# Patient Record
Sex: MALE | Race: WHITE | NOT HISPANIC OR LATINO | Employment: OTHER | URBAN - METROPOLITAN AREA
[De-identification: names, ages, dates, MRNs, and addresses within clinical notes are randomized per-mention and may not be internally consistent; named-entity substitution may affect disease eponyms.]

---

## 2023-03-23 LAB
6-ACETYLMORPHINE: <25 NG/ML
7-AMINOCLONAZEPAM: <25 NG/ML
ALPHA-HYDROXYALPRAZOLAM: <25 NG/ML
ALPHA-HYDROXYMIDAZOLAM: <25 NG/ML
ALPRAZOLAM: <25 NG/ML
AMPHETAMINE (PRESENCE) IN URINE BY SCREEN METHOD: NORMAL
BARBITURATES PRESENCE IN URINE BY SCREEN METHOD: NORMAL
CANNABINOIDS IN URINE BY SCREEN METHOD: NORMAL
CHLORDIAZEPOXIDE: <25 NG/ML
CLONAZEPAM: <25 NG/ML
COCAINE (PRESENCE) IN URINE BY SCREEN METHOD: NORMAL
CODEINE: <50 NG/ML
CREATINE, URINE FOR DRUG: 342.3 MG/DL
DIAZEPAM: <25 NG/ML
DRUG SCREEN COMMENT URINE: NORMAL
EDDP: <25 NG/ML
FENTANYL CONFIRMATION, URINE: <2.5 NG/ML
HYDROCODONE: <25 NG/ML
HYDROMORPHONE: <25 NG/ML
LORAZEPAM: <25 NG/ML
METHADONE CONFIRMATION,URINE: <25 NG/ML
MIDAZOLAM: <25 NG/ML
MORPHINE URINE: <50 NG/ML
NORDIAZEPAM: <25 NG/ML
NORFENTANYL: <2.5 NG/ML
NORHYDROCODONE: <25 NG/ML
NOROXYCODONE: <25 NG/ML
O-DESMETHYLTRAMADOL: <50 NG/ML
OXAZEPAM: <25 NG/ML
OXYCODONE: <25 NG/ML
OXYMORPHONE: <25 NG/ML
PHENCYCLIDINE (PRESENCE) IN URINE BY SCREEN METHOD: NORMAL
TEMAZEPAM: <25 NG/ML
TRAMADOL: <50 NG/ML
ZOLPIDEM METABOLITE (ZCA): <25 NG/ML
ZOLPIDEM: <25 NG/ML

## 2023-04-18 LAB
ALANINE AMINOTRANSFERASE (SGPT) (U/L) IN SER/PLAS: 16 U/L (ref 10–52)
ALBUMIN (G/DL) IN SER/PLAS: 4.4 G/DL (ref 3.4–5)
ALKALINE PHOSPHATASE (U/L) IN SER/PLAS: 121 U/L (ref 33–120)
ANION GAP IN SER/PLAS: 14 MMOL/L (ref 10–20)
ASPARTATE AMINOTRANSFERASE (SGOT) (U/L) IN SER/PLAS: 14 U/L (ref 9–39)
BILIRUBIN TOTAL (MG/DL) IN SER/PLAS: 0.4 MG/DL (ref 0–1.2)
CALCIUM (MG/DL) IN SER/PLAS: 9.7 MG/DL (ref 8.6–10.6)
CARBON DIOXIDE, TOTAL (MMOL/L) IN SER/PLAS: 24 MMOL/L (ref 21–32)
CHLORIDE (MMOL/L) IN SER/PLAS: 107 MMOL/L (ref 98–107)
CHOLESTEROL (MG/DL) IN SER/PLAS: 174 MG/DL (ref 0–199)
CHOLESTEROL IN HDL (MG/DL) IN SER/PLAS: 41.3 MG/DL
CHOLESTEROL/HDL RATIO: 4.2
COBALAMIN (VITAMIN B12) (PG/ML) IN SER/PLAS: 322 PG/ML (ref 211–911)
CREATININE (MG/DL) IN SER/PLAS: 1.1 MG/DL (ref 0.5–1.3)
ERYTHROCYTE DISTRIBUTION WIDTH (RATIO) BY AUTOMATED COUNT: 14.1 % (ref 11.5–14.5)
ERYTHROCYTE MEAN CORPUSCULAR HEMOGLOBIN CONCENTRATION (G/DL) BY AUTOMATED: 31.4 G/DL (ref 32–36)
ERYTHROCYTE MEAN CORPUSCULAR VOLUME (FL) BY AUTOMATED COUNT: 87 FL (ref 80–100)
ERYTHROCYTES (10*6/UL) IN BLOOD BY AUTOMATED COUNT: 4.72 X10E12/L (ref 4.5–5.9)
ESTIMATED AVERAGE GLUCOSE FOR HBA1C: 131 MG/DL
GFR MALE: 79 ML/MIN/1.73M2
GLUCOSE (MG/DL) IN SER/PLAS: 93 MG/DL (ref 74–99)
HEMATOCRIT (%) IN BLOOD BY AUTOMATED COUNT: 41.1 % (ref 41–52)
HEMOGLOBIN (G/DL) IN BLOOD: 12.9 G/DL (ref 13.5–17.5)
HEMOGLOBIN A1C/HEMOGLOBIN TOTAL IN BLOOD: 6.2 %
LDL: 99 MG/DL (ref 0–99)
LEUKOCYTES (10*3/UL) IN BLOOD BY AUTOMATED COUNT: 6.4 X10E9/L (ref 4.4–11.3)
NRBC (PER 100 WBCS) BY AUTOMATED COUNT: 0 /100 WBC (ref 0–0)
PLATELETS (10*3/UL) IN BLOOD AUTOMATED COUNT: 270 X10E9/L (ref 150–450)
POTASSIUM (MMOL/L) IN SER/PLAS: 4.1 MMOL/L (ref 3.5–5.3)
PROSTATE SPECIFIC ANTIGEN,SCREEN: 1.3 NG/ML (ref 0–4)
PROTEIN TOTAL: 7 G/DL (ref 6.4–8.2)
SODIUM (MMOL/L) IN SER/PLAS: 141 MMOL/L (ref 136–145)
TRIGLYCERIDE (MG/DL) IN SER/PLAS: 169 MG/DL (ref 0–149)
UREA NITROGEN (MG/DL) IN SER/PLAS: 13 MG/DL (ref 6–23)
VLDL: 34 MG/DL (ref 0–40)

## 2023-04-25 DIAGNOSIS — I10 ESSENTIAL (PRIMARY) HYPERTENSION: ICD-10-CM

## 2023-04-27 ENCOUNTER — OFFICE VISIT (OUTPATIENT)
Dept: PRIMARY CARE | Facility: CLINIC | Age: 53
End: 2023-04-27
Payer: MEDICARE

## 2023-04-27 VITALS
WEIGHT: 194 LBS | SYSTOLIC BLOOD PRESSURE: 122 MMHG | DIASTOLIC BLOOD PRESSURE: 76 MMHG | HEIGHT: 72 IN | BODY MASS INDEX: 26.28 KG/M2

## 2023-04-27 DIAGNOSIS — I10 PRIMARY HYPERTENSION: ICD-10-CM

## 2023-04-27 DIAGNOSIS — I73.1 THROMBOANGIITIS OBLITERANS (BUERGER'S DISEASE) (CMS-HCC): ICD-10-CM

## 2023-04-27 DIAGNOSIS — G95.9 CERVICAL MYELOPATHY (MULTI): Primary | ICD-10-CM

## 2023-04-27 DIAGNOSIS — E11.9 TYPE 2 DIABETES MELLITUS WITHOUT COMPLICATION, WITHOUT LONG-TERM CURRENT USE OF INSULIN (MULTI): ICD-10-CM

## 2023-04-27 DIAGNOSIS — N18.30 STAGE 3 CHRONIC KIDNEY DISEASE, UNSPECIFIED WHETHER STAGE 3A OR 3B CKD (MULTI): ICD-10-CM

## 2023-04-27 DIAGNOSIS — R31.29 MICROSCOPIC HEMATURIA: ICD-10-CM

## 2023-04-27 PROCEDURE — 1036F TOBACCO NON-USER: CPT | Performed by: STUDENT IN AN ORGANIZED HEALTH CARE EDUCATION/TRAINING PROGRAM

## 2023-04-27 PROCEDURE — 4010F ACE/ARB THERAPY RXD/TAKEN: CPT | Performed by: STUDENT IN AN ORGANIZED HEALTH CARE EDUCATION/TRAINING PROGRAM

## 2023-04-27 PROCEDURE — 3044F HG A1C LEVEL LT 7.0%: CPT | Performed by: STUDENT IN AN ORGANIZED HEALTH CARE EDUCATION/TRAINING PROGRAM

## 2023-04-27 PROCEDURE — 3074F SYST BP LT 130 MM HG: CPT | Performed by: STUDENT IN AN ORGANIZED HEALTH CARE EDUCATION/TRAINING PROGRAM

## 2023-04-27 PROCEDURE — 3078F DIAST BP <80 MM HG: CPT | Performed by: STUDENT IN AN ORGANIZED HEALTH CARE EDUCATION/TRAINING PROGRAM

## 2023-04-27 PROCEDURE — 99214 OFFICE O/P EST MOD 30 MIN: CPT | Performed by: STUDENT IN AN ORGANIZED HEALTH CARE EDUCATION/TRAINING PROGRAM

## 2023-04-27 RX ORDER — WARFARIN 7.5 MG/1
7.5 TABLET ORAL NIGHTLY
COMMUNITY
End: 2023-04-27 | Stop reason: SDUPTHER

## 2023-04-27 RX ORDER — TOPIRAMATE 25 MG/1
25 TABLET ORAL DAILY
COMMUNITY
Start: 2023-04-03 | End: 2023-04-27 | Stop reason: SINTOL

## 2023-04-27 RX ORDER — OMEPRAZOLE 40 MG/1
40 CAPSULE, DELAYED RELEASE ORAL 2 TIMES DAILY
COMMUNITY

## 2023-04-27 RX ORDER — DOCUSATE SODIUM 100 MG/1
100 CAPSULE, LIQUID FILLED ORAL 2 TIMES DAILY
COMMUNITY

## 2023-04-27 RX ORDER — AMLODIPINE BESYLATE 10 MG/1
10 TABLET ORAL DAILY
Qty: 90 TABLET | Refills: 1 | Status: SHIPPED | OUTPATIENT
Start: 2023-04-27 | End: 2023-11-13 | Stop reason: WASHOUT

## 2023-04-27 RX ORDER — LOSARTAN POTASSIUM 100 MG/1
TABLET ORAL
Qty: 90 TABLET | Refills: 1 | Status: SHIPPED | OUTPATIENT
Start: 2023-04-27 | End: 2023-10-24

## 2023-04-27 RX ORDER — GABAPENTIN 300 MG/1
600 CAPSULE ORAL EVERY 8 HOURS
COMMUNITY

## 2023-04-27 RX ORDER — ACETAMINOPHEN 500 MG
500 TABLET ORAL EVERY 6 HOURS PRN
COMMUNITY

## 2023-04-27 RX ORDER — FERROUS SULFATE 325(65) MG
65 TABLET, DELAYED RELEASE (ENTERIC COATED) ORAL
COMMUNITY
Start: 2020-07-24

## 2023-04-27 RX ORDER — AMLODIPINE BESYLATE 10 MG/1
TABLET ORAL
Qty: 90 TABLET | Refills: 1 | Status: SHIPPED | OUTPATIENT
Start: 2023-04-27 | End: 2023-12-18 | Stop reason: SDUPTHER

## 2023-04-27 RX ORDER — AMLODIPINE BESYLATE 10 MG/1
10 TABLET ORAL DAILY
COMMUNITY
End: 2023-04-27 | Stop reason: SDUPTHER

## 2023-04-27 RX ORDER — ASPIRIN 81 MG/1
81 TABLET ORAL DAILY
COMMUNITY
Start: 2017-01-19

## 2023-04-27 RX ORDER — BACLOFEN 10 MG/1
10 TABLET ORAL 3 TIMES DAILY
COMMUNITY
End: 2024-01-26 | Stop reason: SDUPTHER

## 2023-04-27 RX ORDER — WARFARIN 7.5 MG/1
7.5 TABLET ORAL NIGHTLY
Qty: 90 TABLET | Refills: 1 | Status: SHIPPED | OUTPATIENT
Start: 2023-04-27 | End: 2023-12-18 | Stop reason: SDUPTHER

## 2023-04-27 RX ORDER — METFORMIN HYDROCHLORIDE 500 MG/1
500 TABLET, EXTENDED RELEASE ORAL DAILY
COMMUNITY
Start: 2023-04-17 | End: 2023-10-03

## 2023-04-27 RX ORDER — TIZANIDINE 2 MG/1
2 TABLET ORAL EVERY 6 HOURS PRN
COMMUNITY
Start: 2023-01-17 | End: 2023-04-27

## 2023-04-27 RX ORDER — VIT C/E/ZN/COPPR/LUTEIN/ZEAXAN 250MG-90MG
25 CAPSULE ORAL DAILY
COMMUNITY
End: 2024-01-26 | Stop reason: SDUPTHER

## 2023-04-27 RX ORDER — LOSARTAN POTASSIUM 100 MG/1
100 TABLET ORAL DAILY
COMMUNITY
End: 2024-03-04 | Stop reason: SDUPTHER

## 2023-04-27 RX ORDER — DULOXETIN HYDROCHLORIDE 60 MG/1
60 CAPSULE, DELAYED RELEASE ORAL 2 TIMES DAILY
COMMUNITY

## 2023-04-27 RX ORDER — ATORVASTATIN CALCIUM 40 MG/1
40 TABLET, FILM COATED ORAL DAILY
COMMUNITY
End: 2023-07-14

## 2023-04-27 NOTE — PROGRESS NOTES
Subjective   Patient ID: Noah Allen is a 53 y.o. male who presents for Follow-up (Med refill).    HPI   Reports doing alright. Continues to attend weekly AA sessions. Last drink was 5-6 months ago. Notes no particular improvement or worsening of mood. Denies current or any recent SI/HI. He is tolerating the increased metformin dose, was happy to hear his A1c went down. Pt was glads his carotid US was unremarkable and knows he needs a repeat egd- said he prefers to get it ordered through his GI provider and will call Dr. Shaffer (who is aware of this, per note review). Pt was also advised by his nephrologist to follow up on hematuria noted on his recent UA (Large, RBC >150). For the past 2years pt has had to wake up at least 3 times to use the restroom. Denies any hesitation, incomplete emptying, dysuria or hematuria. No personal or family hx of bladder or prostate Ca. Quit smoking 5yrs ago was smoking 1pp/day for at least 5 years.    Review of Systems  ROS negative for fever, chills, chest pain, shortness of breath, cough, abdominal pain, nausea, vomiting, dysuria.     Objective   /76   Ht 1.829 m (6')   Wt 88 kg (194 lb)   BMI 26.31 kg/m²     Physical Exam  Constitutional:       General: He is not in acute distress.  HENT:      Mouth/Throat:      Mouth: Mucous membranes are moist.   Eyes:      Extraocular Movements: Extraocular movements intact.      Pupils: Pupils are equal, round, and reactive to light.   Cardiovascular:      Rate and Rhythm: Normal rate and regular rhythm.   Pulmonary:      Breath sounds: Normal breath sounds. No wheezing or rhonchi.   Abdominal:      General: Bowel sounds are normal. There is no distension.      Palpations: Abdomen is soft.      Tenderness: There is no abdominal tenderness.   Musculoskeletal:      Right lower leg: No edema.      Left lower leg: No edema.   Skin:     General: Skin is warm and dry.   Neurological:      General: No focal deficit present.       Mental Status: He is alert and oriented to person, place, and time.   Psychiatric:         Mood and Affect: Mood normal.           Assessment/Plan   #microscopic hematuria  -Follows a Nephrologist at [facility]. UA done at [facility] for microscopic hematuria (Large, RBC >150)  -PSA 1.33  -refer to urology    #Lightheadedness  #C/f Carotid Obstruction  - Carotid Ultrasound Feb 2023 negative for stenosis greater than 50%. Pt no longer bothered by LH    #T2DM  :: 4/2023 A1c 6.2%.   - c/w Metformin to 500mg BID     #C/f EtOH Use DIsorder  :: Per daughter Tori at prior appointment, this has been a more chronic, long-standing problem than the patient has self-reported. Previously was provided referral to substance use disorder counselor and access clinic for Psychiatry. Discussed possible outpatient rehab admission, resources, support groups available.  - Following w/ AA, Neymar quach, completed IOP  - Has abstained from EtOH since NOv. 2022    #Depression- stable on Duloxetine, follows with psychiatry. He denies active or passive suicidal ideation, but advised to seek immediate medical attention if her were to have suicidal thoughts.    #C/f SI  :: No active SI at this time  :: Son removed guns from home  :: Was recommended to go to ED if there is any concern for active SI.    #HTN- Maintained on amlodipine and losartan. Still elevated, Stage II, patient reports stressors  #Neck Surgeries, Neuropathy- Follows at [facility], now awaiting new provider given closure of SV, maintained on baclofen and gabapentin.   #Buerger's Disease- Maintained on coumadin, quit smoking 4 years ago, follows with INR clinic; monitor closely on Metformin  #GERD, Salinas's esophagus- On PPI, due for repeat EGD in summer  #HLD- On atorvastatin 40mg, recent lipids reviewed.  #CKD- Follows with nephrology at [facility]   #SAAD: seeing sleep medicine, CPAP has been advised    #Health Maintenance  Has had colonoscopy 2020 (repeat due in  5 years), repeat EGD for Salinas's due July 2023 (pt prefers to call GI provider to schedule this), has COVID vaccine x2, recommended additional dose.   LDCT screening for lung cancer advised, would like to discuss this during next visit    RTC- 3 month     Trainee role: Resident    I saw and evaluated the patient. I personally obtained the key and critical portions of the history and physical exam or was physically present for key and critical portions performed by the trainee. I reviewed the trainee's documentation and discussed the patient with the trainee. I agree with the trainee's medical decision making as documented on the trainee's notes.    Braulio Jain M.D.

## 2023-06-26 LAB
CREATININE (MG/DL) IN SER/PLAS: 1.13 MG/DL (ref 0.5–1.3)
GFR MALE: 77 ML/MIN/1.73M2

## 2023-07-14 DIAGNOSIS — E78.5 HYPERLIPIDEMIA, UNSPECIFIED: ICD-10-CM

## 2023-07-14 RX ORDER — ATORVASTATIN CALCIUM 40 MG/1
TABLET, FILM COATED ORAL
Qty: 90 TABLET | Refills: 0 | Status: SHIPPED | OUTPATIENT
Start: 2023-07-14 | End: 2023-10-17

## 2023-07-27 ENCOUNTER — OFFICE VISIT (OUTPATIENT)
Dept: PRIMARY CARE | Facility: CLINIC | Age: 53
End: 2023-07-27
Payer: MEDICARE

## 2023-07-27 VITALS
BODY MASS INDEX: 26.01 KG/M2 | SYSTOLIC BLOOD PRESSURE: 122 MMHG | HEIGHT: 72 IN | WEIGHT: 192 LBS | DIASTOLIC BLOOD PRESSURE: 74 MMHG

## 2023-07-27 DIAGNOSIS — Z00.00 HEALTH CARE MAINTENANCE: ICD-10-CM

## 2023-07-27 DIAGNOSIS — I10 PRIMARY HYPERTENSION: ICD-10-CM

## 2023-07-27 DIAGNOSIS — M25.541 ARTHRALGIA OF RIGHT HAND: ICD-10-CM

## 2023-07-27 DIAGNOSIS — E11.9 TYPE 2 DIABETES MELLITUS WITHOUT COMPLICATION, WITHOUT LONG-TERM CURRENT USE OF INSULIN (MULTI): Primary | ICD-10-CM

## 2023-07-27 DIAGNOSIS — N18.30 STAGE 3 CHRONIC KIDNEY DISEASE, UNSPECIFIED WHETHER STAGE 3A OR 3B CKD (MULTI): ICD-10-CM

## 2023-07-27 PROCEDURE — 4010F ACE/ARB THERAPY RXD/TAKEN: CPT | Performed by: STUDENT IN AN ORGANIZED HEALTH CARE EDUCATION/TRAINING PROGRAM

## 2023-07-27 PROCEDURE — 3044F HG A1C LEVEL LT 7.0%: CPT | Performed by: STUDENT IN AN ORGANIZED HEALTH CARE EDUCATION/TRAINING PROGRAM

## 2023-07-27 PROCEDURE — 1036F TOBACCO NON-USER: CPT | Performed by: STUDENT IN AN ORGANIZED HEALTH CARE EDUCATION/TRAINING PROGRAM

## 2023-07-27 PROCEDURE — 99214 OFFICE O/P EST MOD 30 MIN: CPT | Performed by: STUDENT IN AN ORGANIZED HEALTH CARE EDUCATION/TRAINING PROGRAM

## 2023-07-27 PROCEDURE — 3074F SYST BP LT 130 MM HG: CPT | Performed by: STUDENT IN AN ORGANIZED HEALTH CARE EDUCATION/TRAINING PROGRAM

## 2023-07-27 PROCEDURE — 3078F DIAST BP <80 MM HG: CPT | Performed by: STUDENT IN AN ORGANIZED HEALTH CARE EDUCATION/TRAINING PROGRAM

## 2023-07-27 NOTE — PROGRESS NOTES
Subjective   Patient ID: Noah Allen is a 53 y.o. male who presents for Follow-up (Discuss med/Right hand discomfort).    HPI   Routine fu.  He feels generally well.  He remains abstinent from alcohol for almost 8 months, attending AA.    He has intentionally lost about 50 lbs from his heaviest.  Review of Systems  12-point ROS reviewed and was negative unless otherwise noted in HPI.    Objective   /74   Ht 1.829 m (6')   Wt 87.1 kg (192 lb)   BMI 26.04 kg/m²     Physical Exam  GEN: conversant, NAD  HEENT: PERRL, EOMI, MMM  NECK: supple  CV: S1, S2, RRR  PULM: CTAB  ABD: soft, NT, ND  NEURO: no new gross focal deficits  EXT: no sig LE edema  PSYCH: appropriate affect    Assessment/Plan     #microscopic hematuria  -Follows a Nephrology and urology     #T2DM  :: 4/2023 A1c 6.2%.   - he wants to stop metformin, repeat A1c prior to next visit     #previous EtOH Use DIsorder  - Following w/ [facility], Neymar quach, completed IOP  - Has abstained from EtOH since Nov. 2022     #Depression- stable on Duloxetine, follows with psychiatry.      #HTN- stable, maintained on amlodipine and losartan.    #Neck Surgeries, Neuropathy- Follows at [facility], now awaiting new provider given closure of SV, maintained on baclofen and gabapentin.   #Buerger's Disease- Maintained on coumadin, quit smoking 5 years ago, follows with INR clinic  #GERD, Salinas's esophagus- On PPI, due for repeat EGD in summer  #HLD- On atorvastatin 40mg, recent lipids reviewed.  #CKD- Follows with nephrology at [facility]   #SAAD: seeing sleep medicine, CPAP has been advised     #Health Maintenance  Has had colonoscopy 2020 (repeat due in 5 years), repeat EGD for Salinas's due July 2023 (pt prefers to call GI provider to schedule this), has COVID vaccines  LDCT screening for lung cancer advised 11/2023, patient will consider     RTC- 3 month

## 2023-09-28 LAB
ALANINE AMINOTRANSFERASE (SGPT) (U/L) IN SER/PLAS: 21 U/L (ref 10–52)
ALBUMIN (G/DL) IN SER/PLAS: 4.3 G/DL (ref 3.4–5)
ALKALINE PHOSPHATASE (U/L) IN SER/PLAS: 113 U/L (ref 33–120)
ANION GAP IN SER/PLAS: 13 MMOL/L (ref 10–20)
ASPARTATE AMINOTRANSFERASE (SGOT) (U/L) IN SER/PLAS: 22 U/L (ref 9–39)
BASOPHILS (10*3/UL) IN BLOOD BY AUTOMATED COUNT: 0.04 X10E9/L (ref 0–0.1)
BASOPHILS/100 LEUKOCYTES IN BLOOD BY AUTOMATED COUNT: 0.7 % (ref 0–2)
BILIRUBIN TOTAL (MG/DL) IN SER/PLAS: 0.4 MG/DL (ref 0–1.2)
CALCIUM (MG/DL) IN SER/PLAS: 9.3 MG/DL (ref 8.6–10.3)
CARBON DIOXIDE, TOTAL (MMOL/L) IN SER/PLAS: 26 MMOL/L (ref 21–32)
CHLORIDE (MMOL/L) IN SER/PLAS: 101 MMOL/L (ref 98–107)
CREATININE (MG/DL) IN SER/PLAS: 0.92 MG/DL (ref 0.5–1.3)
EOSINOPHILS (10*3/UL) IN BLOOD BY AUTOMATED COUNT: 0.14 X10E9/L (ref 0–0.7)
EOSINOPHILS/100 LEUKOCYTES IN BLOOD BY AUTOMATED COUNT: 2.5 % (ref 0–6)
ERYTHROCYTE DISTRIBUTION WIDTH (RATIO) BY AUTOMATED COUNT: 13.5 % (ref 11.5–14.5)
ERYTHROCYTE MEAN CORPUSCULAR HEMOGLOBIN CONCENTRATION (G/DL) BY AUTOMATED: 32 G/DL (ref 32–36)
ERYTHROCYTE MEAN CORPUSCULAR VOLUME (FL) BY AUTOMATED COUNT: 86 FL (ref 80–100)
ERYTHROCYTES (10*6/UL) IN BLOOD BY AUTOMATED COUNT: 5.08 X10E12/L (ref 4.5–5.9)
GFR MALE: >90 ML/MIN/1.73M2
GLUCOSE (MG/DL) IN SER/PLAS: 71 MG/DL (ref 74–99)
HEMATOCRIT (%) IN BLOOD BY AUTOMATED COUNT: 43.8 % (ref 41–52)
HEMOGLOBIN (G/DL) IN BLOOD: 14 G/DL (ref 13.5–17.5)
IMMATURE GRANULOCYTES/100 LEUKOCYTES IN BLOOD BY AUTOMATED COUNT: 0.2 % (ref 0–0.9)
LEUKOCYTES (10*3/UL) IN BLOOD BY AUTOMATED COUNT: 5.7 X10E9/L (ref 4.4–11.3)
LYMPHOCYTES (10*3/UL) IN BLOOD BY AUTOMATED COUNT: 2.11 X10E9/L (ref 1.2–4.8)
LYMPHOCYTES/100 LEUKOCYTES IN BLOOD BY AUTOMATED COUNT: 37.1 % (ref 13–44)
MONOCYTES (10*3/UL) IN BLOOD BY AUTOMATED COUNT: 0.56 X10E9/L (ref 0.1–1)
MONOCYTES/100 LEUKOCYTES IN BLOOD BY AUTOMATED COUNT: 9.9 % (ref 2–10)
NEUTROPHILS (10*3/UL) IN BLOOD BY AUTOMATED COUNT: 2.82 X10E9/L (ref 1.2–7.7)
NEUTROPHILS/100 LEUKOCYTES IN BLOOD BY AUTOMATED COUNT: 49.6 % (ref 40–80)
PLATELETS (10*3/UL) IN BLOOD AUTOMATED COUNT: 292 X10E9/L (ref 150–450)
POTASSIUM (MMOL/L) IN SER/PLAS: 4.1 MMOL/L (ref 3.5–5.3)
PROTEIN TOTAL: 7.7 G/DL (ref 6.4–8.2)
SODIUM (MMOL/L) IN SER/PLAS: 136 MMOL/L (ref 136–145)
STAPH/MRSA SCREEN, CULTURE: NORMAL
UREA NITROGEN (MG/DL) IN SER/PLAS: 8 MG/DL (ref 6–23)

## 2023-09-29 LAB — URINE CULTURE: NO GROWTH

## 2023-09-30 NOTE — H&P (VIEW-ONLY)
History of Present Illness:   Admission Reason: Circumcision   HPI:    Date of Consult: 9.28.23    Referring Provider: Dr. Torres    Surgery, Date, and Length: Circumcision, 10/9/23, 1HR     Noah Roa is a 53 year-old male who [facility] for perioperative risk assessment prior to surgery.    Patient presents with a primary diagnosis of phimosis. Pt is uncircumcised and has been having difficulty retracting foreskin x 2-3 months .  He admits to open wounds or cracking of foreskin. He has not tried any  creams for this. No dysuria, f/c/n/v or gross hematuria.  Pt wishes to proceed with surgery as planned.     This note was created in part upon personal review of patient?s medical records.      Patient is scheduled to have Circumcision    Medical History  HTN  HLD  SAAD  --CPAP has been recalled  GERD  Salinas's esophagus  DMII  --lost 30# recently   CKD  Alcohol abuse  --last 11/29/2022  Buerger?s disease  --quit smoking 2018  --Coumadin   Anemia  Cervical myelopathy  Phimosis  Depression   Insomnia       STOP BANG = +SAAD    Caprini = 4    Surgical History  Anterior spinal discectomy   Cervical spine fusion  right knee surgery     Pt denies any past history of anesthetic complications such as PONV, awareness, prolonged sedation, dental damage, aspiration, cardiac arrest, difficult intubation, difficult I.V. access or unexpected hospital admissions.  NO malignant hyperthermia and or pseudocholinesterase deficiency.  No history of blood transfusions     The patient is not a Jehovah?s witness and will accept blood and blood products if medically indicated.   Type and screen NOT sent.     Body Measurements  Height  182.8CM    Weights   9/28 14:26: Weight in kg (Weight (kg))  88.5  9/28 14:26: Weight in lbs ((lbs))  195.1  9/28 14:26: BMI (kg/m2) (BMI (kg/m2))  26.484      Family History:   Asthma: no   Bleeding Disorder: no   Cancer: no   CAD: no   Diabetes: yes  father   Hyperlipidemia: unknown    Hypertension: yes  mother, father   PVD: yes  father   Stroke: yes  mother   VTE: no     Social History:   Social History:  Smoking Status former smoker   Alcohol Use history of abuse   Drug Use denies   Social History    quit smoking 2018; 1PPD x 30 years  EtOH last used 11/2022             Allergies:  ·  penicillin : Resp Distress (Severe)  ·  lisinopril : Cough  ·  sulfa  drugs: Resp Distress (Severe)    Medications Prior to Admission:   7 DAYS BEFORE SURGERY, ON ___10/2/23_____, STOP these medications:  Biotin 5000 mcg oral capsule: 1 dose(s) orally once a day    5 DAYS BEFORE SURGERY, ON ____10/4/23____, STOP these medications:  warfarin 7.5 mg oral tablet: 1 tab(s) orally once a day [id number]  warfarin 7.5 mg oral tablet: 0.5 tab(s) orally once a day T-TH  IF SURGERY IS DELAY OR CANCELLED PLEASE RESUME BLOOD THINNER AS PREVIOUSLY SCHEDULE   *If BRIDGING IS NECESSARY THROUGH COUMADIN CLINIC, PLEASE FOLLOW THEIR INSTRUCTION*      24 HOURS BEFORE SURGERY HOLD THESE MEDS:  losartan 100 mg oral tablet: 1 tab(s) orally once a day    DAY OF SURGERY, TAKE THESE MEDICATIONS:  omeprazole 40 mg oral delayed release capsule: 1 cap(s) orally once a day  Aspir 81 oral delayed release tablet: 1 tab(s) orally once a day  atorvastatin 40 mg oral tablet: 1 tab(s) orally once a day (at bedtime)  amLODIPine 10 mg oral tablet: 1 tab(s) orally once a day  baclofen 10 mg oral tablet: 1 tab(s) orally 2 times a day  DULoxetine 60 mg oral delayed release capsule: 1 cap(s) orally 2 times a day  gabapentin 300 mg oral capsule: 3 cap(s) orally 2 times a day  traMADol 50 mg oral tablet: 1 tab(s) orally 2 times a day    DAY OF SURGERY, DO NOT TAKE THESE MEDICATIONS:   ferrous sulfate 324 mg (65 mg elemental iron) oral tablet: 1 tab(s) orally once a day  metFORMIN 500 mg oral tablet: 1 tab(s) orally 2 times a day  Vitamin D3 25 mcg (1000 intl units) oral tablet: 1 tab(s) orally once a day  docusate sodium 100 mg oral capsule: 1 cap(s)  orally 2 times a day.    Review of Systems:   Constitutional: NEGATIVE: Fever, Chills, Anorexia,  Weight Loss, Malaise     Eyes: NEGATIVE: Blurry Vision, Drainage, Diploplia,  Redness, Vision Loss/ Change     ENMT: NEGATIVE: Nasal Discharge, Nasal Congestion,  Ear Pain, Mouth Pain, Throat Pain     Respiratory: NEGATIVE: Dry Cough, Productive Cough,  Hemoptysis, Wheezing, Shortness of Breath     Cardiac: COMMENTS: Functional 4 Mets. Patient denies SOB walking up 2 flights of stairs ; yardwork, gardening,  grocery shopping, walking     Gastrointestinal: NEGATIVE: Nausea, Vomiting, Diarrhea,  Constipation, Abdominal Pain     Genitourinary: NEGATIVE: Discharge, Dysuria, Flank  Pain, Frequency, Hematuria; COMMENTS: phimosis     Musculoskeletal: POSITIVE: Pain; NEGATIVE: Decreased  ROM, Swelling, Stiffness, Weakness; COMMENTS: neck, b/l knees     Neurological: NEGATIVE: Dizziness, Confusion, Headache,  Seizures, Syncope     Psychiatric: NEGATIVE: Mood Changes, Anxiety, Hallucinations,  Sleep Changes, Suicidal Ideas     Skin: NEGATIVE: Mass, Pain, Pruritus, Rash, Ulcer     Hematologic/Lymph: POSITIVE: Bruising, Easy Bleeding ; NEGATIVE: Anemia, Night Sweats, Petechiae; COMMENTS: coumadin     All Other Systems: All other systems reviewed and  are negative     Objective:     Objective Information:        T   P  R  BP   MAP  SpO2   Value  34.4  74  18  121/73      98%  Date/Time 9/28 14:26 9/28 14:26 9/28 14:26 9/28 14:26    9/28 14:26  Range  (34.4C - 34.4C )  (74 - 74 )  (18 - 18 )  (121 - 121 )/ (73 - 73 )    (98% - 98% )         Weights   9/28 14:26: Weight in kg (Weight (kg))  88.5  9/28 14:26: Weight in lbs ((lbs))  195.1  9/28 14:26: BMI (kg/m2) (BMI (kg/m2))  26.484    Physical Exam by System:    Constitutional: Well developed, awake/alert/oriented  x3, no distress, alert and cooperative   Eyes: EOMI, clear sclera   ENMT: mucous membranes moist, no apparent injury,  no lesions seen   Head/Neck: Neck supple, no  apparent injury, trachea  midline   Respiratory/Thorax: Patent airways, CTAB, normal  breath sounds with good chest expansion, thorax symmetric   Cardiovascular: Regular, rate and rhythm, no murmurs,  2+ equal pulses of the extremities, normal S 1and S 2   Gastrointestinal: Nondistended, soft, non-tender,  no rebound tenderness or guarding, no masses palpable   Genitourinary: no sp pain or dysuria   Musculoskeletal: ROM intact, no joint swelling, normal  strength   Extremities: +Archie's test in b/l hands (Buerger's  disease), no cyanosis edema, contusions or wounds, no clubbing   Neurological: alert and oriented x3, intact senses,  motor, normal strength   Psychological: Appropriate mood and behavior   Skin: Warm and dry, no lesions, no rashes     Airway:  ·  Mouth Opening OK yes   ·  Neck Flexibility OK yes   ·  Loose Teeth no     Airway Classification:  ·  Oropharyngeal Classification Class II     Recent Lab Results:    Results:    I personally reviewed the following test results; I agree with the interpretation.       Test  Result  Flag Reference Goal    Hemoglobin A1C, Level 6.2 % A  nogo    Diagnosis of Diabetes-Adults   Non-Diabetic: < or = 5.6%   Increased risk for developing diabetes: 5.7-6.4%   Diagnostic of diabetes: > or = 6.5%  .       Monitoring of Diabetes                Age (y)     Therapeutic Goal (%)   Adults:          >18           <7.0   Pediatrics:    13-18           <7.5                   7-12           <8.0                   0- 6            7.5-8.5   American Diabetes Association. Diabetes Care 33(S1), Jan 2010.     Estimated Average Glucose 131 MG/DL   nogo     Ordered by: Braulio Jain Collected/Examined: [id number] 11:12AM   4/18/23 CBC   Test  Result  Flag Reference Goal    White Blood Cell Count 6.4 x10E9/L  4.4 - 11.3 nogo    Red Blood Cell Count 4.72 x10E12/L  See Below nogo    Reference Range: 4.50 - 5.90     Nucleated Erythrocyte Count 0.0 /100 WBC  0.0-0.0 nogo    Hemoglobin 12.9 g/dL  L See Below nogo    Reference Range: 13.5 - 17.5     HCT 41.1 %  See Below nogo    Reference Range: 41.0 - 52.0     MCV 87 fL  80 - 100 nogo    MCHC 31.4 g/dL L See Below nogo    Reference Range: 32.0 - 36.0     Platelet Count 270 x10E9/L  150 - 450 nogo    RDW-CV 14.1 %  See Below nogo    Reference Range: 11.5 - 14.5      Ordered by: Braulio Jain Collected/Examined: 18Apr2023 11:12AM  CMP 4/18/23   Test  Result  Flag Reference Goal    Glucose, Serum 93 mg/dL  74 - 99 nogo    Sodium, Serum 141 mmol/L  136 - 145 nogo    POTASSIUM 4.1 mmol/L  3.5 - 5.3 nogo    Chloride, Serum 107 mmol/L  98 - 107 nogo    Bicarbonate, Serum 24 mmol/L  21 - 32 nogo    Anion Gap, Serum 14 mmol/L  10 - 20 nogo    Blood Urea Nitrogen, Serum 13 mg/dL  6 - 23 nogo    CREATININE 1.10 mg/dL  See Below nogo    Reference Range: 0.50 - 1.30     Calcium, Serum 9.7 mg/dL  8.6 - 10.6 nogo    Albumin, Serum 4.4 g/dL  3.4 - 5.0 nogo    ALKALINE PHOSPHATASE 121 U/L H 33 - 120 nogo    Protein, Total Serum 7.0 g/dL  6.4 - 8.2 nogo    Bilirubin, Serum Total 0.4 mg/dL  0.0 - 1.2 nogo    ALT (SGPT), Serum 16 U/L  10 - 52 nogo    Patients treated with Sulfasalazine may generate   falsely decreased results for ALT.     GFR MALE 79 mL/min/1.73m2  >90 nogo    CALCULATIONS OF ESTIMATED GFR ARE PERFORMED   USING THE 2021 CKD-EPI STUDY REFIT EQUATION   WITHOUT THE RACE VARIABLE FOR THE IDMS-TRACEABLE   CREATININE METHODS.    https://jasn.asnjournals.org/content/early/2021/09/22/[email or web address]     AST 14 U/L  9 - 39 nogo     Ordered by: Braulio Jain Collected/Examined: 18Apr2023 11:12AM           I have reviewed these laboratory results:    Complete Blood Count + Differential  28-Sep-2023 15:23:00      Result Value    White Blood Cell Count  5.7    Red Blood Cell Count  5.08    HGB  14.0    HCT  43.8    MCV  86    MCHC  32.0    PLT  292    RDW-CV  13.5    Neutrophil %  49.6    Immature Granulocytes %  0.2    Lymphocyte %  37.1    Monocyte %  9.9    Eosinophil %   2.5    Basophil %  0.7    Neutrophil Count  2.82    Lymphocyte Count  2.11    Monocyte Count  0.56    Eosinophil Count  0.14    Basophil Count  0.04      Comprehensive Metabolic Panel  28-Sep-2023 15:23:00      Result Value    Glucose, Serum  71   L   NA  136    K  4.1    CL  101    Bicarbonate, Serum  26    Anion Gap, Serum  13    BUN  8    CREAT  0.92    GFR Male  >90    Calcium, Serum  9.3    ALB  4.3    ALKP  113    T Pro  7.7    T Bili  0.4    Alanine Aminotransferase, Serum  21    Aspartate Transaminase, Serum  22        Radiology Results:    Results:    EKG 9/28/23  NSR  septal infarct, age undetermined  abnormal ekg  vent rate = 68 bpm    Assessment and Plan:   Assessment:    Patient is a 53-year-old male scheduled for a Circumcision  with Dr. Torres on 10/9/23.  Patient has no active cardiac symptoms.   Patient denies any chest pain, tightness, heaviness, pressure, radiating pain, palpitations, irregular heartbeats, lightheadedness, cough, congestion, shortness of breath, CROCKETT, PND, near syncope,  weight loss or gain.   RCRI  0  , 3.9 % Risk of MACE    Cardiac:  HTN - cont amlodipine on dos  Encouraged lifestyle modifications, low-sodium diet, and increase activity as tolerated.  Monitor BP and follow up with managing physician for readings sustaining >140/90.     HLD - cont statin on dos    Pulmonary:  SAAD - Known and treated  SAAD- Patient was instructed to bring CPAP machine the morning of surgery. Recommend prioritizing  nonopioid analgesic techniques (regional and local anesthesia, nonsteroidal  medications, etc) before the administration of opioids and close monitoring for hypoventilation after surgery due to SAAD. Increased vigilance is recommended with the use of narcotics due to an increased risk for opioid induced respiratory depression     Endocrine:  DMII - hold metformin on dos   HgbA1c  4/18/23 = 6.2%    Vascular:  Buerger?s disease - hold Coumadin 5 days before dos, per Dr. Braulio Jain?  recommendations  Discussed with Dr. Torres and he is ok with continuing ASA 81mg during periop period, including dos, as benefit outweighs risk of stopping. (see doc halo)    Pt does mention bridging with Lovenox in the past, per coumadin clinic.  Pt aware if he is given separate instructions by coumadin clinic, he should follow those.     Hematology:  Anemia  4/18/23 H/H 12.9/41.1%  9/28/23 H/H 14.0/43.8%    Patient instructed to ambulate as soon as possible postoperatively to decrease thromboembolic risk.     LABS: CBC, CMP, UCx and EKG    Followup: none    Caprini: 4      Risk assessment complete.  Patient is scheduled for a low surgical risk procedure.        Preoperative medication instructions were provided and reviewed with the patient.  Any additional testing or evaluation was explained to the patient.  Nothing by mouth instructions were discussed and patient's questions were answered prior to conclusion  to this encounter.  Patient verbalized understanding of preoperative instructions given in preadmission testing; discharge instructions available in EMR.    This note was dictated by a speech recognition.  Minor errors may have been detected in a speech recognition.          Electronic Signatures:  Kori Jones (PAC)  (Signed 29-Sep-2023 06:59)   Authored: History of Present Illness, Family History,  Social History, Allergies, Medications Prior to Admission, Review of Systems, Objective, Assessment and Plan, Note Completion      Last Updated: 29-Sep-2023 06:59 by Kori Jones (PAC)

## 2023-10-03 ENCOUNTER — ANTICOAGULATION - WARFARIN VISIT (OUTPATIENT)
Dept: PHARMACY | Facility: CLINIC | Age: 53
End: 2023-10-03
Payer: MEDICARE

## 2023-10-03 DIAGNOSIS — E11.9 TYPE 2 DIABETES MELLITUS WITHOUT COMPLICATIONS (MULTI): ICD-10-CM

## 2023-10-03 DIAGNOSIS — I73.1 THROMBOANGIITIS OBLITERANS (BUERGER'S DISEASE) (CMS-HCC): Primary | ICD-10-CM

## 2023-10-03 LAB
POC INR: 2.7
POC PROTHROMBIN TIME: NORMAL

## 2023-10-03 PROCEDURE — 85610 PROTHROMBIN TIME: CPT | Performed by: PHARMACIST

## 2023-10-03 PROCEDURE — 99212 OFFICE O/P EST SF 10 MIN: CPT | Performed by: PHARMACIST

## 2023-10-03 RX ORDER — METFORMIN HYDROCHLORIDE 500 MG/1
500 TABLET, EXTENDED RELEASE ORAL 2 TIMES DAILY
Qty: 180 TABLET | Refills: 0 | Status: SHIPPED | OUTPATIENT
Start: 2023-10-03 | End: 2023-11-30 | Stop reason: SDUPTHER

## 2023-10-03 NOTE — PROGRESS NOTES
Coumadin Clinic Visit Note    Patient verified warfarin dose  No missed doses  No unusual bruising or bleeding  Pt took 2 doses tramadol bid. Not sure if will continue tramadol.  Consistent dietary green intake  Pt will have procedure on 10/5 personal sx.  INR Therapeutic today at 2.7  No changes to warfarin dose today  Next appointment post procedure.  Lovenox bridging explained to pt. Lovenox 80mg q12  Pt will stop warfarin today.  He understands administration.      Makayla Junior, PharmD

## 2023-10-04 ENCOUNTER — PREP FOR PROCEDURE (OUTPATIENT)
Dept: POSTOP/PACU | Facility: HOSPITAL | Age: 53
End: 2023-10-04
Payer: MEDICARE

## 2023-10-04 RX ORDER — CIPROFLOXACIN 2 MG/ML
400 INJECTION, SOLUTION INTRAVENOUS ONCE
Status: CANCELLED | OUTPATIENT
Start: 2023-10-04 | End: 2023-10-04

## 2023-10-09 ENCOUNTER — HOSPITAL ENCOUNTER (OUTPATIENT)
Facility: HOSPITAL | Age: 53
Setting detail: OUTPATIENT SURGERY
Discharge: HOME | End: 2023-10-09
Attending: UROLOGY | Admitting: UROLOGY
Payer: MEDICARE

## 2023-10-09 ENCOUNTER — ANESTHESIA (OUTPATIENT)
Dept: OPERATING ROOM | Facility: HOSPITAL | Age: 53
End: 2023-10-09
Payer: MEDICARE

## 2023-10-09 ENCOUNTER — ANESTHESIA EVENT (OUTPATIENT)
Dept: OPERATING ROOM | Facility: HOSPITAL | Age: 53
End: 2023-10-09
Payer: MEDICARE

## 2023-10-09 VITALS
DIASTOLIC BLOOD PRESSURE: 74 MMHG | OXYGEN SATURATION: 95 % | SYSTOLIC BLOOD PRESSURE: 136 MMHG | HEART RATE: 70 BPM | TEMPERATURE: 98.2 F | BODY MASS INDEX: 22.09 KG/M2 | WEIGHT: 190.92 LBS | HEIGHT: 78 IN | RESPIRATION RATE: 16 BRPM

## 2023-10-09 DIAGNOSIS — N47.1 PHIMOSIS: ICD-10-CM

## 2023-10-09 DIAGNOSIS — G89.18 ACUTE POSTOPERATIVE PAIN: Primary | ICD-10-CM

## 2023-10-09 PROBLEM — E66.9 OBESITY: Status: ACTIVE | Noted: 2023-10-09

## 2023-10-09 PROBLEM — K21.9 GASTROESOPHAGEAL REFLUX DISEASE: Status: ACTIVE | Noted: 2023-10-09

## 2023-10-09 PROBLEM — G47.33 OSA (OBSTRUCTIVE SLEEP APNEA): Status: ACTIVE | Noted: 2023-10-09

## 2023-10-09 LAB
GLUCOSE BLD MANUAL STRIP-MCNC: 107 MG/DL (ref 74–99)
GLUCOSE BLD MANUAL STRIP-MCNC: 88 MG/DL (ref 74–99)

## 2023-10-09 PROCEDURE — 7100000002 HC RECOVERY ROOM TIME - EACH INCREMENTAL 1 MINUTE: Performed by: UROLOGY

## 2023-10-09 PROCEDURE — 88305 TISSUE EXAM BY PATHOLOGIST: CPT | Mod: TC,AHULAB | Performed by: UROLOGY

## 2023-10-09 PROCEDURE — 3600000007 HC OR TIME - EACH INCREMENTAL 1 MINUTE - PROCEDURE LEVEL TWO: Performed by: UROLOGY

## 2023-10-09 PROCEDURE — 88305 TISSUE EXAM BY PATHOLOGIST: CPT | Performed by: PATHOLOGY

## 2023-10-09 PROCEDURE — 88304 TISSUE EXAM BY PATHOLOGIST: CPT | Performed by: PATHOLOGY

## 2023-10-09 PROCEDURE — 3600000002 HC OR TIME - INITIAL BASE CHARGE - PROCEDURE LEVEL TWO: Performed by: UROLOGY

## 2023-10-09 PROCEDURE — 2500000004 HC RX 250 GENERAL PHARMACY W/ HCPCS (ALT 636 FOR OP/ED): Performed by: ANESTHESIOLOGIST ASSISTANT

## 2023-10-09 PROCEDURE — 7100000001 HC RECOVERY ROOM TIME - INITIAL BASE CHARGE: Performed by: UROLOGY

## 2023-10-09 PROCEDURE — 2500000005 HC RX 250 GENERAL PHARMACY W/O HCPCS: Performed by: ANESTHESIOLOGIST ASSISTANT

## 2023-10-09 PROCEDURE — 2500000005 HC RX 250 GENERAL PHARMACY W/O HCPCS: Performed by: UROLOGY

## 2023-10-09 PROCEDURE — 7100000009 HC PHASE TWO TIME - INITIAL BASE CHARGE: Performed by: UROLOGY

## 2023-10-09 PROCEDURE — 3700000001 HC GENERAL ANESTHESIA TIME - INITIAL BASE CHARGE: Performed by: UROLOGY

## 2023-10-09 PROCEDURE — 7100000010 HC PHASE TWO TIME - EACH INCREMENTAL 1 MINUTE: Performed by: UROLOGY

## 2023-10-09 PROCEDURE — 3700000002 HC GENERAL ANESTHESIA TIME - EACH INCREMENTAL 1 MINUTE: Performed by: UROLOGY

## 2023-10-09 PROCEDURE — 54060 EXCISION OF PENIS LESION(S): CPT | Performed by: UROLOGY

## 2023-10-09 PROCEDURE — 88304 TISSUE EXAM BY PATHOLOGIST: CPT | Mod: TC,AHULAB | Performed by: UROLOGY

## 2023-10-09 PROCEDURE — 2500000004 HC RX 250 GENERAL PHARMACY W/ HCPCS (ALT 636 FOR OP/ED): Performed by: UROLOGY

## 2023-10-09 PROCEDURE — A54161 PR CIRCUMCISION,OTHR: Performed by: STUDENT IN AN ORGANIZED HEALTH CARE EDUCATION/TRAINING PROGRAM

## 2023-10-09 PROCEDURE — 82947 ASSAY GLUCOSE BLOOD QUANT: CPT

## 2023-10-09 PROCEDURE — 54161 CIRCUM 28 DAYS OR OLDER: CPT | Performed by: UROLOGY

## 2023-10-09 PROCEDURE — 2500000001 HC RX 250 WO HCPCS SELF ADMINISTERED DRUGS (ALT 637 FOR MEDICARE OP): Performed by: STUDENT IN AN ORGANIZED HEALTH CARE EDUCATION/TRAINING PROGRAM

## 2023-10-09 PROCEDURE — 2500000001 HC RX 250 WO HCPCS SELF ADMINISTERED DRUGS (ALT 637 FOR MEDICARE OP): Performed by: UROLOGY

## 2023-10-09 PROCEDURE — 2720000007 HC OR 272 NO HCPCS: Performed by: UROLOGY

## 2023-10-09 PROCEDURE — A54161 PR CIRCUMCISION,OTHR: Performed by: ANESTHESIOLOGIST ASSISTANT

## 2023-10-09 PROCEDURE — A4217 STERILE WATER/SALINE, 500 ML: HCPCS | Performed by: UROLOGY

## 2023-10-09 PROCEDURE — 88304 TISSUE EXAM BY PATHOLOGIST: CPT | Mod: TC,SUR,AHULAB | Performed by: UROLOGY

## 2023-10-09 RX ORDER — ONDANSETRON HYDROCHLORIDE 2 MG/ML
INJECTION, SOLUTION INTRAVENOUS AS NEEDED
Status: DISCONTINUED | OUTPATIENT
Start: 2023-10-09 | End: 2023-10-09

## 2023-10-09 RX ORDER — BACITRACIN 500 [USP'U]/G
1 OINTMENT TOPICAL 3 TIMES DAILY
Status: SHIPPED | OUTPATIENT
Start: 2023-10-09

## 2023-10-09 RX ORDER — ACETAMINOPHEN 325 MG/1
650 TABLET ORAL EVERY 6 HOURS PRN
Qty: 30 TABLET | Refills: 0 | Status: SHIPPED | OUTPATIENT
Start: 2023-10-09 | End: 2024-01-26 | Stop reason: ALTCHOICE

## 2023-10-09 RX ORDER — GLYCOPYRROLATE 0.2 MG/ML
INJECTION INTRAMUSCULAR; INTRAVENOUS AS NEEDED
Status: DISCONTINUED | OUTPATIENT
Start: 2023-10-09 | End: 2023-10-09

## 2023-10-09 RX ORDER — PROPOFOL 10 MG/ML
INJECTION, EMULSION INTRAVENOUS AS NEEDED
Status: DISCONTINUED | OUTPATIENT
Start: 2023-10-09 | End: 2023-10-09

## 2023-10-09 RX ORDER — BUPIVACAINE HYDROCHLORIDE 5 MG/ML
INJECTION, SOLUTION PERINEURAL AS NEEDED
Status: DISCONTINUED | OUTPATIENT
Start: 2023-10-09 | End: 2023-10-09 | Stop reason: HOSPADM

## 2023-10-09 RX ORDER — CIPROFLOXACIN 2 MG/ML
INJECTION, SOLUTION INTRAVENOUS AS NEEDED
Status: DISCONTINUED | OUTPATIENT
Start: 2023-10-09 | End: 2023-10-09

## 2023-10-09 RX ORDER — LIDOCAINE HYDROCHLORIDE 10 MG/ML
INJECTION INFILTRATION; PERINEURAL AS NEEDED
Status: DISCONTINUED | OUTPATIENT
Start: 2023-10-09 | End: 2023-10-09 | Stop reason: HOSPADM

## 2023-10-09 RX ORDER — ONDANSETRON HYDROCHLORIDE 2 MG/ML
4 INJECTION, SOLUTION INTRAVENOUS ONCE AS NEEDED
Status: DISCONTINUED | OUTPATIENT
Start: 2023-10-09 | End: 2023-10-09 | Stop reason: HOSPADM

## 2023-10-09 RX ORDER — DIPHENHYDRAMINE HYDROCHLORIDE 50 MG/ML
12.5 INJECTION INTRAMUSCULAR; INTRAVENOUS ONCE AS NEEDED
Status: DISCONTINUED | OUTPATIENT
Start: 2023-10-09 | End: 2023-10-09 | Stop reason: HOSPADM

## 2023-10-09 RX ORDER — LIDOCAINE HYDROCHLORIDE 10 MG/ML
INJECTION INFILTRATION; PERINEURAL AS NEEDED
Status: DISCONTINUED | OUTPATIENT
Start: 2023-10-09 | End: 2023-10-09

## 2023-10-09 RX ORDER — MIDAZOLAM HYDROCHLORIDE 1 MG/ML
INJECTION, SOLUTION INTRAMUSCULAR; INTRAVENOUS AS NEEDED
Status: DISCONTINUED | OUTPATIENT
Start: 2023-10-09 | End: 2023-10-09

## 2023-10-09 RX ORDER — BACITRACIN ZINC 500 UNIT/G
OINTMENT (GRAM) TOPICAL 2 TIMES DAILY
Qty: 1 G | Refills: 1 | Status: SHIPPED | OUTPATIENT
Start: 2023-10-09 | End: 2023-10-23

## 2023-10-09 RX ORDER — LIDOCAINE HYDROCHLORIDE 10 MG/ML
0.1 INJECTION, SOLUTION EPIDURAL; INFILTRATION; INTRACAUDAL; PERINEURAL ONCE
Status: DISCONTINUED | OUTPATIENT
Start: 2023-10-09 | End: 2023-10-09 | Stop reason: HOSPADM

## 2023-10-09 RX ORDER — LABETALOL HYDROCHLORIDE 5 MG/ML
5 INJECTION, SOLUTION INTRAVENOUS ONCE AS NEEDED
Status: DISCONTINUED | OUTPATIENT
Start: 2023-10-09 | End: 2023-10-09 | Stop reason: HOSPADM

## 2023-10-09 RX ORDER — PHENYLEPHRINE HCL IN 0.9% NACL 0.4MG/10ML
SYRINGE (ML) INTRAVENOUS AS NEEDED
Status: DISCONTINUED | OUTPATIENT
Start: 2023-10-09 | End: 2023-10-09

## 2023-10-09 RX ORDER — OXYCODONE HYDROCHLORIDE 5 MG/1
5 TABLET ORAL EVERY 6 HOURS PRN
Qty: 15 TABLET | Refills: 0 | Status: SHIPPED | OUTPATIENT
Start: 2023-10-09 | End: 2024-05-29 | Stop reason: WASHOUT

## 2023-10-09 RX ORDER — BACITRACIN 500 [USP'U]/G
OINTMENT TOPICAL AS NEEDED
Status: DISCONTINUED | OUTPATIENT
Start: 2023-10-09 | End: 2023-10-09 | Stop reason: HOSPADM

## 2023-10-09 RX ORDER — FENTANYL CITRATE 50 UG/ML
INJECTION, SOLUTION INTRAMUSCULAR; INTRAVENOUS AS NEEDED
Status: DISCONTINUED | OUTPATIENT
Start: 2023-10-09 | End: 2023-10-09

## 2023-10-09 RX ORDER — ENOXAPARIN SODIUM 100 MG/ML
80 INJECTION SUBCUTANEOUS EVERY 12 HOURS
COMMUNITY
End: 2024-01-26 | Stop reason: ALTCHOICE

## 2023-10-09 RX ORDER — OXYCODONE HYDROCHLORIDE 5 MG/1
5 TABLET ORAL EVERY 4 HOURS PRN
Status: DISCONTINUED | OUTPATIENT
Start: 2023-10-09 | End: 2023-10-09 | Stop reason: HOSPADM

## 2023-10-09 RX ORDER — SODIUM CHLORIDE, SODIUM LACTATE, POTASSIUM CHLORIDE, CALCIUM CHLORIDE 600; 310; 30; 20 MG/100ML; MG/100ML; MG/100ML; MG/100ML
100 INJECTION, SOLUTION INTRAVENOUS CONTINUOUS
Status: DISCONTINUED | OUTPATIENT
Start: 2023-10-09 | End: 2023-10-09 | Stop reason: HOSPADM

## 2023-10-09 RX ORDER — SODIUM CHLORIDE 0.9 G/100ML
IRRIGANT IRRIGATION AS NEEDED
Status: DISCONTINUED | OUTPATIENT
Start: 2023-10-09 | End: 2023-10-09 | Stop reason: HOSPADM

## 2023-10-09 RX ORDER — MIDAZOLAM HYDROCHLORIDE 1 MG/ML
INJECTION INTRAMUSCULAR; INTRAVENOUS AS NEEDED
Status: DISCONTINUED | OUTPATIENT
Start: 2023-10-09 | End: 2023-10-09

## 2023-10-09 RX ADMIN — Medication 100 MCG: at 14:39

## 2023-10-09 RX ADMIN — MIDAZOLAM HYDROCHLORIDE 2 MG: 1 INJECTION, SOLUTION INTRAMUSCULAR; INTRAVENOUS at 14:05

## 2023-10-09 RX ADMIN — FENTANYL CITRATE 100 MCG: 50 INJECTION, SOLUTION INTRAMUSCULAR; INTRAVENOUS at 14:05

## 2023-10-09 RX ADMIN — MIDAZOLAM HYDROCHLORIDE 2 MG: 1 INJECTION, SOLUTION INTRAMUSCULAR; INTRAVENOUS at 14:10

## 2023-10-09 RX ADMIN — PROPOFOL 50 MG: 10 INJECTION, EMULSION INTRAVENOUS at 14:44

## 2023-10-09 RX ADMIN — ONDANSETRON 4 MG: 2 INJECTION INTRAMUSCULAR; INTRAVENOUS at 14:42

## 2023-10-09 RX ADMIN — Medication 200 MCG: at 14:27

## 2023-10-09 RX ADMIN — OXYCODONE HYDROCHLORIDE 5 MG: 5 TABLET ORAL at 15:59

## 2023-10-09 RX ADMIN — GLYCOPYRROLATE 0.2 MG: 0.2 INJECTION INTRAMUSCULAR; INTRAVENOUS at 14:05

## 2023-10-09 RX ADMIN — LIDOCAINE HYDROCHLORIDE 100 MG: 10 INJECTION, SOLUTION INFILTRATION; PERINEURAL at 14:15

## 2023-10-09 RX ADMIN — PROPOFOL 150 MG: 10 INJECTION, EMULSION INTRAVENOUS at 14:15

## 2023-10-09 RX ADMIN — CIPROFLOXACIN 400 MG: 2 INJECTION, SOLUTION INTRAVENOUS at 14:20

## 2023-10-09 RX ADMIN — Medication 200 MCG: at 14:32

## 2023-10-09 RX ADMIN — Medication 200 MCG: at 14:24

## 2023-10-09 ASSESSMENT — PAIN - FUNCTIONAL ASSESSMENT
PAIN_FUNCTIONAL_ASSESSMENT: 0-10

## 2023-10-09 ASSESSMENT — COLUMBIA-SUICIDE SEVERITY RATING SCALE - C-SSRS
1. IN THE PAST MONTH, HAVE YOU WISHED YOU WERE DEAD OR WISHED YOU COULD GO TO SLEEP AND NOT WAKE UP?: NO
2. HAVE YOU ACTUALLY HAD ANY THOUGHTS OF KILLING YOURSELF?: NO
6. HAVE YOU EVER DONE ANYTHING, STARTED TO DO ANYTHING, OR PREPARED TO DO ANYTHING TO END YOUR LIFE?: NO

## 2023-10-09 ASSESSMENT — PAIN SCALES - GENERAL
PAINLEVEL_OUTOF10: 0 - NO PAIN
PAINLEVEL_OUTOF10: 0 - NO PAIN
PAINLEVEL_OUTOF10: 2
PAINLEVEL_OUTOF10: 8
PAINLEVEL_OUTOF10: 2
PAINLEVEL_OUTOF10: 0 - NO PAIN
PAINLEVEL_OUTOF10: 0 - NO PAIN
PAINLEVEL_OUTOF10: 2

## 2023-10-09 NOTE — DISCHARGE INSTRUCTIONS
DEPARTMENT OF UROLOGY  DISCHARGE INSTRUCTIONS CYSTOSCOPY CIRCUMCISION  Outpatient Surgery    Noah Allen    Call [phone] during regular daytime business hours (8:00 am - 5:00 pm) and after 5:00 pm ask for the Urology resident with any questions or concerns.      If it is a life-threatening situation, proceed to the nearest emergency department.        Thank you for the opportunity to care for you today.  Your health and healing are very important to us.  We hope we made you feel as comfortable as possible and are committed to your recovery and continued well-being.      The following is a brief overview of your circumcision procedure today. Some of the information contained on this summary may be confidential.  This information should be kept in your records and should be shared with your regular doctor.    Physicians:   Dr. Torres      Procedure performed: removed the foreskin from your penis  Pending results:  none     What to Expect During your Recovery and Home Care  Anesthesia Side Effects   You received anesthesia today.  You may feel sleepy, tired, or have a sore throat.   You may also feel drowsiness, dizziness, or inability to think clearly.  For your safety, do not drive, drink alcoholic beverages, take any unprescribed medication or make any important decisions for 24 hours.  A responsible adult should be with you for 24 hours.        Activity and Recovery    No heavy lifting and rest the next few days. Refrain from sexual activity and getting an erection until all sutures have been dissolved and incision has healed.     Pain Control  Unfortunately, you may experience pain after your procedure.  Adequate pain management can include alternative measures to help ease your pain and that can include over the counter Tylenol/ibuprofen or oxycodone which can be taken as prescribed as needed for breakthrough pain. Do not take more than 4,000mg of Tylenol in a 24-hour period.      Nausea/Vomiting    Clear liquids are best tolerated at first. Start slow, advance your diet as tolerated to normal foods. Avoid spicy, greasy, heavy foods at first. Also, you may feel nauseous or like you need to vomit if you take any type of medication on an empty stomach.  Call your physician if you are unable to eat or drink and have persistent vomiting.    Signs of Bleeding   Minor bleeding or drainage may occur from the surgical site; however, excessive or consistent bleeding should be reported to your surgeon. Excessive bleeding is defined as blood that is dripping from wound, soaking your bandages, and is ketchup colored, thick with possible blood clots.  Consistent is defined as bleeding that does not stop.      You can start your blood thinner medication (Lovenox/Warfarin) in 2 days.    Treatment/wound care:   Keep area(s) clean and dry.   It is okay to shower 24 hours after time of surgery.    Do not scrub wound(s), pat dry.    Do not submerge wound(s) in standing water until seen for follow up appointment (no tub bathing, swimming, or hot tubs).    Clean with mild soap, gentle washing, pat dry, cover with bandage as needed.  Avoid waterproof bandages.  No oils or lotions on incisions.  Do not remove the sutures on your own, they will dissolve or fall out on their own time.    Please visually inspect your wound(s) at least once daily.  If the wound(s) are in a difficult to see location, please use a mirror or have someone else assist with visual inspection.    Signs of Infection  Signs of infection can include fever, redness, heat, swelling, drainage(green/yellow), chills, burning sensation with passing of urine, or severe abdominal pain.  If you see any of these occur, please contact your doctor's office at [phone]. Any fever higher than 100.4, especially if associated with an ill feeling, abdominal pain, chills, or nausea should be reported to your surgeon.      Assist in bowel movements/urination  Increase fiber in  diet  Increase water (6 to 8 glasses)  Increase walking   Urination should occur within 6 hours of anesthesia  If you have tried these methods and your bladder still feels full and you cannot use the bathroom, please go to your nearest Emergency room/contact your physician.    Additional Instructions:   You can remove the bandages covering your penis tomorrow.  Use the antibiotic ointment(bacitracin) provided to you and place on your incision three times per day. During each application, please ensure to stretch the penile skin from the base so to prevent it from covering the glans. Then you place the bacitracin to cover the stitches.

## 2023-10-09 NOTE — INTERVAL H&P NOTE
Surgical Attestation:     I have reviewed the patient's History and Physical Examination.  I have personally seen and evaluated the patient, repeating key portions. There is no significant interval change.     Surgery is still indicated.  Yes     Consent reviewed and signed by patient/family: Yes     Operative site verified: Yes    No changes to medical history or medications    Carter Mercado MD

## 2023-10-09 NOTE — ANESTHESIA PROCEDURE NOTES
Airway  Date/Time: 10/9/2023 2:18 PM  Urgency: elective    Airway not difficult    Staffing  Performed: CYRUS   Authorized by: Corby Gregory MD    Performed by: CYRUS Loza  Patient location during procedure: OR    Indications and Patient Condition  Indications for airway management: anesthesia  Spontaneous Ventilation: absent  Sedation level: deep  Preoxygenated: yes  Patient position: sniffing  Mask difficulty assessment: 2 - vent by mask + OA or adjuvant +/- NMBA  Planned trial extubation    Final Airway Details  Final airway type: endotracheal airway      Successful airway: ETT  Cuffed: yes   Successful intubation technique: direct laryngoscopy  Facilitating devices/methods: intubating stylet and SGA  Endotracheal tube insertion site: oral  ETT size (mm): 5.0  Cormack-Lehane Classification: grade I - full view of glottis  Placement verified by: chest auscultation, capnometry and palpation of cuff   Measured from: lips  Number of attempts at approach: 1  Number of other approaches attempted: 0

## 2023-10-09 NOTE — ANESTHESIA POSTPROCEDURE EVALUATION
Patient: Noah Allen    Procedure Summary       Date: 10/09/23 Room / Location: [facility] A OR 01 / Virtual U A OR    Anesthesia Start: 1405 Anesthesia Stop: 1508    Procedure: Circumcision and removalof skin lesion Diagnosis:       Phimosis      (Phimosis [N47.1])    Surgeons: Bartolo Torres MD Responsible Provider: Corby Gregory MD    Anesthesia Type: general ASA Status: 3            Anesthesia Type: general    Vitals Value Taken Time   /84 10/09/23 1601   Temp 36.8 °C (98.2 °F) 10/09/23 1600   Pulse 80 10/09/23 1613   Resp 14 10/09/23 1600   SpO2 94 % 10/09/23 1613   Vitals shown include unvalidated device data.    Anesthesia Post Evaluation    Patient location during evaluation: bedside  Level of consciousness: awake  Pain management: adequate  Multimodal analgesia pain management approach  Cardiovascular status: acceptable  Respiratory status: acceptable  Hydration status: acceptable        No notable events documented.

## 2023-10-09 NOTE — OP NOTE
Circumcision and removalof skin lesion Operative Note     Date: 10/9/2023  OR Location: [facility] A OR    Name: Noah Allen, : 1970, Age: 53 y.o., MRN: [id number], Sex: male    Diagnosis  Pre-op Diagnosis     * Phimosis [N47.1] Post-op Diagnosis     * Phimosis [N47.1]     Procedures    * Circumcision and removalof skin lesion    Surgeons      * Bartolo Torres - Primary    Resident/Fellow/Other Assistant:  No surgical staff documented.    Procedure Summary  Anesthesia: General  ASA: III  Anesthesia Staff: Anesthesiologist: Corby Gregory MD  C-AA: CYRUS Loza  Estimated Blood Loss: 5mL  Intra-op Medications: * No intraprocedure medications in log *           Anesthesia Record               Intraprocedure I/O Totals          Intake    Autologous Blood 0 mL    Cell Saver 0 mL    Total Intake 0 mL       Output    Urine 0 mL    Est. Blood Loss 5 mL    NG/OG Tube Output 0 mL    Other 0 mL    Total Output 5 mL       Net    Net Volume -5 mL          Specimen:   ID Type Source Tests Collected by Time   1 : peripenial skin lesion Tissue SOFT TISSUE MASS BIOPSY SURGICAL PATHOLOGY EXAM Bartolo Torres MD 10/9/2023 1427   2 : foreskin Tissue FORESKIN, OTHER THAN  SURGICAL PATHOLOGY EXAM Bartolo Torres MD 10/9/2023 1433        Staff:   Circulator: Ghazala Khoury RN  Relief Circulator: Alba Crawford RN  Scrub Person: Lo Marcos         Drains and/or Catheters: * None in log *    Tourniquet Times:         Implants:     Findings: skin lesion adjacent to base of shaft, excised. Uncomplicated sleeve circumcision    Indications: Noah Allen is an 53 y.o. male who is having surgery for Phimosis [N47.1].     The patient was seen in the preoperative area. The risks, benefits, complications, treatment options, non-operative alternatives, expected recovery and outcomes were discussed with the patient. The possibilities of reaction to medication, pulmonary aspiration, injury to  surrounding structures, bleeding, recurrent infection, the need for additional procedures, failure to diagnose a condition, and creating a complication requiring transfusion or operation were discussed with the patient. The patient concurred with the proposed plan, giving informed consent.  The site of surgery was properly noted/marked if necessary per policy. The patient has been actively warmed in preoperative area. Preoperative antibiotics have been ordered and given within 1 hours of incision. Venous thrombosis prophylaxis have been ordered including bilateral sequential compression devices    Procedure Details: Patient was brought back to the operating room and placed under general anesthesia.  He was positioned in the supine position with his arms.  He was prepped and draped in the normal sterile fashion.  A preoperative pause was performed and preoperative antibiotics were confirmed to be given.    We began by performing both a penile block and a local block around the lesion near the penile shaft.  We then made an elliptical incision around the skin lesion and removed this staying very shallow incision subcutaneous tissue.  We obtain hemostasis in the bed of resection and then closed the lesion with a running 3-0 chromic suture.  We then turned our attention to the circumcision.  We marked the outer penile skin just above the coronal ridge.  We then marked the inner penile skin about a centimeter below the coronal ridge.  We then used a scalpel to cut down into dartos circumferentially.  We then proceeded to peel the sleeve excess.  Bovie electrocautery.  We obtained meticulous hemostasis.  We then reapproximated the circumcision scar with 3-0 and 4-0 chromic sutures in interrupted fashion.  Additional local anesthetic was injected.  Curlex and Coban was applied to the penile shaft.  The patient was awoke from general esthesia and transferred to PACU in stable condition.  Complications:  None; patient  tolerated the procedure well.    Disposition: PACU - hemodynamically stable.  Condition: stable         Additional Details:     Attending Attestation: I was present and scrubbed for the entire procedure.    Bartolo Torres  Phone Number: [phone]

## 2023-10-09 NOTE — POST-PROCEDURE NOTE
Patient dressed with RN at bedside. PIV removed without complication. Discharge instructions reviewed with the patient and visitor. Patient to main lobby via transport in stable condition with all belongings.

## 2023-10-10 ENCOUNTER — TELEPHONE (OUTPATIENT)
Dept: PHARMACY | Facility: CLINIC | Age: 53
End: 2023-10-10
Payer: MEDICARE

## 2023-10-10 ENCOUNTER — TELEPHONE (OUTPATIENT)
Dept: PRIMARY CARE | Facility: CLINIC | Age: 53
End: 2023-10-10
Payer: MEDICARE

## 2023-10-10 NOTE — TELEPHONE ENCOUNTER
Patient called clinic on 10/10/23  He was advised by surgeon to continue holding warfarin after procedure and not to resume until 10/12/23    Patient states the surgeon did not tell him anything about Lovenox and when to resume    Advised patient he MUST contact his surgeon and his referring doctor (Dr Jain) to clarify if he is to also wait to resume Lovenox on 10/12 or if he should continue using as prescribed    Went over how Lovenox and warfarin work as anticoagulants and advised patient that since he is being bridged with Lovenox, he is deemed to be at high risk for a stroke or cardiovascular event which is why he must clarify with performing surgeon when to resume (clot vs bleeding risk post procedure)    Patient states he does not have a clotting risk and is unsure why he is on Lovenox to begin with    Advised patient he MUST discuss this with Dr Jain as we do not prescribe Lovenox unless the doctor advises    PLAN:   Patient was instructed to contact surgeon to clarify Lovenox resume date  Patient was told to take warfarin 7.5 mg daily starting on 10/12/23 and to continue Lovenox if advised by surgeon until INR is 2  Patient also instructed to contact Dr Jain's office to notify of delay in resuming warfarin post procedure  Patient rescheduled for next INR check on 10/16/23 at 1:30 pm

## 2023-10-10 NOTE — TELEPHONE ENCOUNTER
Per patient FYI: had surgery with Dr Torres yesterday. Will be off lovenox and warfarin, will resume on 10/12

## 2023-10-12 ENCOUNTER — TELEPHONE (OUTPATIENT)
Dept: PHARMACY | Facility: CLINIC | Age: 53
End: 2023-10-12
Payer: MEDICARE

## 2023-10-12 ENCOUNTER — APPOINTMENT (OUTPATIENT)
Dept: PHARMACY | Facility: CLINIC | Age: 53
End: 2023-10-12
Payer: MEDICARE

## 2023-10-12 PROBLEM — R29.898 LIMB WEAKNESS: Status: ACTIVE | Noted: 2023-10-12

## 2023-10-12 PROBLEM — M48.02 CERVICAL STENOSIS OF SPINE: Status: ACTIVE | Noted: 2023-10-12

## 2023-10-12 PROBLEM — S01.91XA LACERATION OF HEAD: Status: ACTIVE | Noted: 2023-10-12

## 2023-10-12 PROBLEM — E78.5 HYPERLIPIDEMIA: Status: ACTIVE | Noted: 2023-10-12

## 2023-10-12 PROBLEM — G89.29 CHRONIC PAIN: Status: ACTIVE | Noted: 2023-10-12

## 2023-10-12 PROBLEM — N47.8 FORESKIN PROBLEM: Status: ACTIVE | Noted: 2023-10-12

## 2023-10-12 PROBLEM — F51.02 INSOMNIA, TRANSIENT: Status: ACTIVE | Noted: 2023-10-12

## 2023-10-12 PROBLEM — M77.12 LATERAL EPICONDYLITIS OF BOTH ELBOWS: Status: ACTIVE | Noted: 2023-10-12

## 2023-10-12 PROBLEM — M50.222 HERNIATED NUCLEUS PULPOSUS, C5-6: Status: ACTIVE | Noted: 2023-10-12

## 2023-10-12 PROBLEM — R31.0 GROSS HEMATURIA: Status: ACTIVE | Noted: 2023-10-12

## 2023-10-12 PROBLEM — R42 LIGHTHEADEDNESS: Status: ACTIVE | Noted: 2023-10-12

## 2023-10-12 PROBLEM — R31.29 INTERMITTENT MICROSCOPIC HEMATURIA: Status: ACTIVE | Noted: 2023-10-12

## 2023-10-12 PROBLEM — F10.20 ALCOHOL USE DISORDER, SEVERE, DEPENDENCE (MULTI): Status: ACTIVE | Noted: 2023-10-12

## 2023-10-12 PROBLEM — D64.9 ANEMIA, NORMOCYTIC NORMOCHROMIC: Status: ACTIVE | Noted: 2023-10-12

## 2023-10-12 PROBLEM — E55.9 VITAMIN D DEFICIENCY: Status: ACTIVE | Noted: 2023-10-12

## 2023-10-12 PROBLEM — J94.2 HEMOTHORAX, LEFT: Status: ACTIVE | Noted: 2023-10-12

## 2023-10-12 PROBLEM — R20.0 NUMBNESS: Status: ACTIVE | Noted: 2023-10-12

## 2023-10-12 PROBLEM — M77.11 LATERAL EPICONDYLITIS OF BOTH ELBOWS: Status: ACTIVE | Noted: 2023-10-12

## 2023-10-12 PROBLEM — D50.9 ANEMIA, IRON DEFICIENCY: Status: ACTIVE | Noted: 2023-10-12

## 2023-10-12 PROBLEM — R35.0 URINARY FREQUENCY: Status: ACTIVE | Noted: 2023-10-12
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
## 2023-10-12 PROBLEM — N18.9 CKD (CHRONIC KIDNEY DISEASE): Status: ACTIVE | Noted: 2023-10-12

## 2023-10-12 PROBLEM — K22.70 BARRETT'S ESOPHAGUS: Status: ACTIVE | Noted: 2023-10-12

## 2023-10-12 PROBLEM — Z98.1 S/P CERVICAL SPINAL FUSION: Status: ACTIVE | Noted: 2023-10-12

## 2023-10-12 PROBLEM — R06.83 SNORING: Status: ACTIVE | Noted: 2023-10-12

## 2023-10-12 PROBLEM — F43.21 SITUATIONAL DEPRESSION: Status: ACTIVE | Noted: 2023-10-12

## 2023-10-12 PROBLEM — M47.812 CERVICAL SPONDYLOSIS: Status: ACTIVE | Noted: 2023-10-12

## 2023-10-12 PROBLEM — E11.9 DIABETES MELLITUS (MULTI): Status: ACTIVE | Noted: 2023-10-12

## 2023-10-12 PROBLEM — M50.223 HERNIATED NUCLEUS PULPOSUS, C6-7: Status: ACTIVE | Noted: 2023-10-12

## 2023-10-12 PROBLEM — M50.21 HERNIATED NUCLEUS PULPOSUS, C3-4: Status: ACTIVE | Noted: 2023-10-12

## 2023-10-12 PROBLEM — M54.12 CERVICAL RADICULOPATHY: Status: ACTIVE | Noted: 2023-10-12

## 2023-10-12 PROBLEM — M54.16 LUMBAR RADICULOPATHY: Status: ACTIVE | Noted: 2023-10-12

## 2023-10-12 RX ORDER — TRAMADOL HYDROCHLORIDE 50 MG/1
50 TABLET ORAL 2 TIMES DAILY
COMMUNITY
Start: 2023-10-04 | End: 2023-11-03

## 2023-10-12 RX ORDER — FERROUS SULFATE 325(65) MG
1 TABLET ORAL 2 TIMES DAILY
COMMUNITY
End: 2024-01-26 | Stop reason: ALTCHOICE

## 2023-10-12 RX ORDER — GABAPENTIN 300 MG/1
3 CAPSULE ORAL 2 TIMES DAILY
COMMUNITY

## 2023-10-12 RX ORDER — ACETAMINOPHEN 500 MG
1 TABLET ORAL DAILY
COMMUNITY
Start: 2020-07-24

## 2023-10-12 RX ORDER — BACLOFEN 10 MG/1
1 TABLET ORAL 2 TIMES DAILY PRN
COMMUNITY
Start: 2021-12-04

## 2023-10-12 RX ORDER — TOPIRAMATE 25 MG/1
2 TABLET ORAL NIGHTLY
COMMUNITY
Start: 2023-05-10

## 2023-10-12 NOTE — TELEPHONE ENCOUNTER
Called pt at home because he did not show up for his appointment today.  He said that Dr Torres told him not to take any warfarin or Lovenox post op due to bleeding.  He said he still has some bleeding at incision site today.  He is calling his doctor today to ask when to start taking warfarin again.  He will call our clinic to let us know if he is to take his warfarin and when to start dosing again.

## 2023-10-16 ENCOUNTER — TELEPHONE (OUTPATIENT)
Dept: PHARMACY | Facility: CLINIC | Age: 53
End: 2023-10-16
Payer: MEDICARE

## 2023-10-16 ENCOUNTER — APPOINTMENT (OUTPATIENT)
Dept: PHARMACY | Facility: CLINIC | Age: 53
End: 2023-10-16
Payer: MEDICARE

## 2023-10-16 LAB
LABORATORY COMMENT REPORT: NORMAL
PATH REPORT.FINAL DX SPEC: NORMAL
PATH REPORT.GROSS SPEC: NORMAL
PATH REPORT.RELEVANT HX SPEC: NORMAL
PATH REPORT.TOTAL CANCER: NORMAL

## 2023-10-17 ENCOUNTER — APPOINTMENT (OUTPATIENT)
Dept: PHARMACY | Facility: CLINIC | Age: 53
End: 2023-10-17
Payer: MEDICARE

## 2023-10-17 ENCOUNTER — ANTICOAGULATION - WARFARIN VISIT (OUTPATIENT)
Dept: PHARMACY | Facility: CLINIC | Age: 53
End: 2023-10-17
Payer: MEDICARE

## 2023-10-17 DIAGNOSIS — I73.1 THROMBOANGIITIS OBLITERANS (BUERGER'S DISEASE) (CMS-HCC): Primary | ICD-10-CM

## 2023-10-17 DIAGNOSIS — E78.5 HYPERLIPIDEMIA, UNSPECIFIED: ICD-10-CM

## 2023-10-17 LAB
POC INR: 1.8
POC PROTHROMBIN TIME: NORMAL

## 2023-10-17 PROCEDURE — 85610 PROTHROMBIN TIME: CPT

## 2023-10-17 PROCEDURE — 99212 OFFICE O/P EST SF 10 MIN: CPT

## 2023-10-17 RX ORDER — ATORVASTATIN CALCIUM 40 MG/1
TABLET, FILM COATED ORAL
Qty: 90 TABLET | Refills: 0 | Status: SHIPPED | OUTPATIENT
Start: 2023-10-17 | End: 2024-01-11

## 2023-10-17 NOTE — PROGRESS NOTES
Coumadin Clinic Visit Note  Patient is currently positive for COVID  Patient verified warfarin dose  Patient resumed warfarin on 10/12/23, NOT lovenox  Patient refuses to use lovenox and will discuss with his referring physician   Patient resumed at 15 mg on Friday 10/13/23, 15 mg on 10/14/23, then 7.5 mg on 10/15/23, and 10/16/23  No unusual bruising or bleeding  Patient is taking acetaminophen, codeine (has 3 tablets left post procedure), and tramadol currently   Took Nyquil a few times during past week  Patient has not been eating well recently due to illness  No anticipated procedures at this time  INR Subtherapeutic today at 1.8  Boost dose of 7.5 mg today but keep weekly dose same  Next appointment 3 weeks    Zofia Orellana, Pharm D

## 2023-10-24 DIAGNOSIS — I10 ESSENTIAL (PRIMARY) HYPERTENSION: ICD-10-CM

## 2023-10-24 RX ORDER — LOSARTAN POTASSIUM 100 MG/1
TABLET ORAL
Qty: 90 TABLET | Refills: 0 | Status: SHIPPED | OUTPATIENT
Start: 2023-10-24 | End: 2023-11-13 | Stop reason: WASHOUT

## 2023-11-02 ENCOUNTER — PREP FOR PROCEDURE (OUTPATIENT)
Dept: GASTROENTEROLOGY | Facility: HOSPITAL | Age: 53
End: 2023-11-02
Payer: MEDICARE

## 2023-11-07 ENCOUNTER — ANTICOAGULATION - WARFARIN VISIT (OUTPATIENT)
Dept: PHARMACY | Facility: CLINIC | Age: 53
End: 2023-11-07
Payer: MEDICARE

## 2023-11-07 DIAGNOSIS — I73.1 THROMBOANGIITIS OBLITERANS (BUERGER'S DISEASE) (CMS-HCC): Primary | ICD-10-CM

## 2023-11-07 LAB
POC INR: 2.9
POC PROTHROMBIN TIME: NORMAL

## 2023-11-07 PROCEDURE — 85610 PROTHROMBIN TIME: CPT | Mod: QW

## 2023-11-07 PROCEDURE — 99212 OFFICE O/P EST SF 10 MIN: CPT

## 2023-11-07 NOTE — PROGRESS NOTES
"No bleeding or unusual bruising.  Medications and doses verified.  Patient is having an endoscopy Monday 11/13/23 and needs to hold Warfarin for 5 days prior with Lovenox bridging.  Pt Wt=85 kg  INR=2.9   Plan: Continue same weekly Warfarin dose and follow Lovenox bridging schedule.  Follow up INR check within 1 week post procedure.    Called in Lovenox Rx to [facility] ([address] [place] [address] Rd[address] [place]) [phone]  Enoxaparin 80 mg subcutaneous q12h  #5 syringes (pt has 8 syringes left from October procedure)  no RF    Enoxaparin (Lovenox) \"Bridging for Warfarin (Coumadin/Jantoven)  Enoxaparin (Lovenox) Dose and Frequency:  Lovenox 80 mg subcutaneous every 12 hours                                  Pre-Op and Post-Op Anticoagulation Instructions            Day          Date               Instructions             -7               -6               -5 11/8/23 Do not take Warfarin             -4 11/9/23 Do not take Warfarin  Inject Enoxaparin (Lovenox) in the morning and evening (12 hours apart)             -3 11/10/23 Do not take Warfarin  Inject Enoxaparin (Lovenox) in the morning and evening (12 hours apart)             -2 11/11/23 Do not take Warfarin  Inject Enoxaparin (Lovenox) in the morning and evening (12 hours apart)             -1  DAY BEFORE PROCEDURE 11/12/23 Do not take Warfarin  Only take Enoxaparin (Lovenox) dose in the morning  Do not take evening Enoxaparin (Lovenox)        DAY OF         PROCEDURE 11/13/23 Do not take Enoxaparin (Lovenox) today  Take your usual dose of warfarin in the evening            +1 11/14/23 Take you usual dose of warfarin  Inject Enoxaparin (Lovenox) in the morning and evening (12 hours apart)            +2 11/15/23 Take you usual dose of warfarin  Inject Enoxaparin (Lovenox) in the morning and evening (12 hours apart)            +3 11/16/23 Take you usual dose of warfarin  Inject Enoxaparin (Lovenox) in the morning and evening (12 hours apart)            +4 11/17/23 Schedule appointment with " Anticoagulation Clinic to access further need for Enoxaparin (Lovenox)            +5

## 2023-11-07 NOTE — PATIENT INSTRUCTIONS
"Enoxaparin (Lovenox) \"Bridging for Warfarin (Coumadin/Jantoven)  Enoxaparin (Lovenox) Dose and Frequency:  Lovenox 80 mg subcutaneous every 12 hours                                  Pre-Op and Post-Op Anticoagulation Instructions            Day          Date               Instructions             -7               -6               -5 11/8/23 Do not take Warfarin             -4 11/9/23 Do not take Warfarin  Inject Enoxaparin (Lovenox) in the morning and evening (12 hours apart)             -3 11/10/23 Do not take Warfarin  Inject Enoxaparin (Lovenox) in the morning and evening (12 hours apart)             -2 11/11/23 Do not take Warfarin  Inject Enoxaparin (Lovenox) in the morning and evening (12 hours apart)             -1  DAY BEFORE PROCEDURE 11/12/23 Do not take Warfarin  Only take Enoxaparin (Lovenox) dose in the morning  Do not take evening Enoxaparin (Lovenox)        DAY OF         PROCEDURE 11/13/23 Do not take Enoxaparin (Lovenox) today  Take your usual dose of warfarin in the evening            +1 11/14/23 Take you usual dose of warfarin  Inject Enoxaparin (Lovenox) in the morning and evening (12 hours apart)            +2 11/15/23 Take you usual dose of warfarin  Inject Enoxaparin (Lovenox) in the morning and evening (12 hours apart)            +3 11/16/23 Take you usual dose of warfarin  Inject Enoxaparin (Lovenox) in the morning and evening (12 hours apart)            +4 11/17/23 Schedule appointment with Anticoagulation Clinic to access further need for Enoxaparin (Lovenox)     "

## 2023-11-09 ENCOUNTER — OFFICE VISIT (OUTPATIENT)
Dept: UROLOGY | Facility: HOSPITAL | Age: 53
End: 2023-11-09
Payer: MEDICARE

## 2023-11-09 DIAGNOSIS — N47.8 FORESKIN PROBLEM: ICD-10-CM

## 2023-11-09 DIAGNOSIS — N52.9 ERECTILE DISORDER: Primary | ICD-10-CM

## 2023-11-09 PROCEDURE — 3044F HG A1C LEVEL LT 7.0%: CPT | Performed by: UROLOGY

## 2023-11-09 PROCEDURE — 4010F ACE/ARB THERAPY RXD/TAKEN: CPT | Performed by: UROLOGY

## 2023-11-09 PROCEDURE — 1036F TOBACCO NON-USER: CPT | Performed by: UROLOGY

## 2023-11-09 PROCEDURE — 99214 OFFICE O/P EST MOD 30 MIN: CPT | Mod: 24 | Performed by: UROLOGY

## 2023-11-09 PROCEDURE — 99214 OFFICE O/P EST MOD 30 MIN: CPT | Performed by: UROLOGY

## 2023-11-09 PROCEDURE — 3078F DIAST BP <80 MM HG: CPT | Performed by: UROLOGY

## 2023-11-09 PROCEDURE — 3074F SYST BP LT 130 MM HG: CPT | Performed by: UROLOGY

## 2023-11-09 RX ORDER — SILDENAFIL 100 MG/1
100 TABLET, FILM COATED ORAL DAILY PRN
Qty: 30 TABLET | Refills: 5 | Status: SHIPPED | OUTPATIENT
Start: 2023-11-09 | End: 2024-05-29 | Stop reason: WASHOUT
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
## 2023-11-10 ENCOUNTER — TELEPHONE (OUTPATIENT)
Dept: PREADMISSION TESTING | Facility: HOSPITAL | Age: 53
End: 2023-11-10

## 2023-11-13 ENCOUNTER — ANESTHESIA (OUTPATIENT)
Dept: GASTROENTEROLOGY | Facility: HOSPITAL | Age: 53
End: 2023-11-13
Payer: MEDICARE

## 2023-11-13 ENCOUNTER — HOSPITAL ENCOUNTER (OUTPATIENT)
Dept: GASTROENTEROLOGY | Facility: HOSPITAL | Age: 53
Discharge: HOME | End: 2023-11-13
Payer: MEDICARE

## 2023-11-13 ENCOUNTER — ANESTHESIA EVENT (OUTPATIENT)
Dept: GASTROENTEROLOGY | Facility: HOSPITAL | Age: 53
End: 2023-11-13
Payer: MEDICARE

## 2023-11-13 VITALS
HEART RATE: 71 BPM | DIASTOLIC BLOOD PRESSURE: 70 MMHG | OXYGEN SATURATION: 99 % | SYSTOLIC BLOOD PRESSURE: 134 MMHG | RESPIRATION RATE: 18 BRPM | TEMPERATURE: 96.8 F

## 2023-11-13 DIAGNOSIS — K21.9 GASTRO-ESOPHAGEAL REFLUX DISEASE WITHOUT ESOPHAGITIS: Primary | ICD-10-CM

## 2023-11-13 LAB — GLUCOSE BLD MANUAL STRIP-MCNC: 84 MG/DL (ref 74–99)

## 2023-11-13 PROCEDURE — 43239 EGD BIOPSY SINGLE/MULTIPLE: CPT | Performed by: INTERNAL MEDICINE

## 2023-11-13 PROCEDURE — 2500000004 HC RX 250 GENERAL PHARMACY W/ HCPCS (ALT 636 FOR OP/ED): Performed by: NURSE ANESTHETIST, CERTIFIED REGISTERED

## 2023-11-13 PROCEDURE — 3700000001 HC GENERAL ANESTHESIA TIME - INITIAL BASE CHARGE

## 2023-11-13 PROCEDURE — 88305 TISSUE EXAM BY PATHOLOGIST: CPT | Mod: TC,PARLAB | Performed by: INTERNAL MEDICINE

## 2023-11-13 PROCEDURE — A43251 PR EDG REMOVAL TUMOR POLYP/OTHER LESION SNARE TECH: Performed by: NURSE ANESTHETIST, CERTIFIED REGISTERED

## 2023-11-13 PROCEDURE — 3700000002 HC GENERAL ANESTHESIA TIME - EACH INCREMENTAL 1 MINUTE

## 2023-11-13 PROCEDURE — 0753T DGTZ GLS MCRSCP SLD LEVEL IV: CPT | Mod: TC,SUR,PARLAB | Performed by: INTERNAL MEDICINE

## 2023-11-13 PROCEDURE — 43251 EGD REMOVE LESION SNARE: CPT | Performed by: INTERNAL MEDICINE

## 2023-11-13 PROCEDURE — 82947 ASSAY GLUCOSE BLOOD QUANT: CPT

## 2023-11-13 PROCEDURE — A43251 PR EDG REMOVAL TUMOR POLYP/OTHER LESION SNARE TECH: Performed by: ANESTHESIOLOGY

## 2023-11-13 PROCEDURE — 7100000009 HC PHASE TWO TIME - INITIAL BASE CHARGE

## 2023-11-13 PROCEDURE — 2500000004 HC RX 250 GENERAL PHARMACY W/ HCPCS (ALT 636 FOR OP/ED): Performed by: INTERNAL MEDICINE

## 2023-11-13 PROCEDURE — 7100000010 HC PHASE TWO TIME - EACH INCREMENTAL 1 MINUTE

## 2023-11-13 PROCEDURE — 88305 TISSUE EXAM BY PATHOLOGIST: CPT | Performed by: STUDENT IN AN ORGANIZED HEALTH CARE EDUCATION/TRAINING PROGRAM

## 2023-11-13 PROCEDURE — 2720000007 HC OR 272 NO HCPCS

## 2023-11-13 RX ORDER — SODIUM CHLORIDE, SODIUM LACTATE, POTASSIUM CHLORIDE, CALCIUM CHLORIDE 600; 310; 30; 20 MG/100ML; MG/100ML; MG/100ML; MG/100ML
20 INJECTION, SOLUTION INTRAVENOUS CONTINUOUS
Status: DISCONTINUED | OUTPATIENT
Start: 2023-11-13 | End: 2023-11-14 | Stop reason: HOSPADM

## 2023-11-13 RX ORDER — PROPOFOL 10 MG/ML
INJECTION, EMULSION INTRAVENOUS AS NEEDED
Status: DISCONTINUED | OUTPATIENT
Start: 2023-11-13 | End: 2023-11-13

## 2023-11-13 RX ORDER — ONDANSETRON HYDROCHLORIDE 2 MG/ML
4 INJECTION, SOLUTION INTRAVENOUS ONCE AS NEEDED
Status: CANCELLED | OUTPATIENT
Start: 2023-11-13

## 2023-11-13 RX ADMIN — PROPOFOL 80 MG: 10 INJECTION, EMULSION INTRAVENOUS at 14:54

## 2023-11-13 RX ADMIN — SODIUM CHLORIDE, POTASSIUM CHLORIDE, SODIUM LACTATE AND CALCIUM CHLORIDE 20 ML/HR: 600; 310; 30; 20 INJECTION, SOLUTION INTRAVENOUS at 14:11

## 2023-11-13 RX ADMIN — PROPOFOL 30 MG: 10 INJECTION, EMULSION INTRAVENOUS at 15:08

## 2023-11-13 RX ADMIN — PROPOFOL 50 MG: 10 INJECTION, EMULSION INTRAVENOUS at 15:02

## 2023-11-13 RX ADMIN — PROPOFOL 20 MG: 10 INJECTION, EMULSION INTRAVENOUS at 15:07

## 2023-11-13 RX ADMIN — PROPOFOL 20 MG: 10 INJECTION, EMULSION INTRAVENOUS at 15:06

## 2023-11-13 RX ADMIN — PROPOFOL 30 MG: 10 INJECTION, EMULSION INTRAVENOUS at 15:00

## 2023-11-13 RX ADMIN — PROPOFOL 20 MG: 10 INJECTION, EMULSION INTRAVENOUS at 15:05

## 2023-11-13 RX ADMIN — PROPOFOL 50 MG: 10 INJECTION, EMULSION INTRAVENOUS at 14:55

## 2023-11-13 RX ADMIN — PROPOFOL 20 MG: 10 INJECTION, EMULSION INTRAVENOUS at 14:57

## 2023-11-13 RX ADMIN — PROPOFOL 20 MG: 10 INJECTION, EMULSION INTRAVENOUS at 14:56

## 2023-11-13 RX ADMIN — PROPOFOL 20 MG: 10 INJECTION, EMULSION INTRAVENOUS at 15:04

## 2023-11-13 SDOH — HEALTH STABILITY: MENTAL HEALTH: CURRENT SMOKER: 0

## 2023-11-13 ASSESSMENT — PAIN - FUNCTIONAL ASSESSMENT
PAIN_FUNCTIONAL_ASSESSMENT: 0-10

## 2023-11-13 ASSESSMENT — PAIN SCALES - GENERAL
PAINLEVEL_OUTOF10: 0 - NO PAIN
PAINLEVEL_OUTOF10: 0 - NO PAIN
PAIN_LEVEL: 0
PAINLEVEL_OUTOF10: 0 - NO PAIN
PAINLEVEL_OUTOF10: 0 - NO PAIN

## 2023-11-13 NOTE — DISCHARGE INSTRUCTIONS

## 2023-11-13 NOTE — ANESTHESIA POSTPROCEDURE EVALUATION
Patient: Noah Allen    Procedure Summary       Date: 11/13/23 Room / Location: [facility]    Anesthesia Start: 1449 Anesthesia Stop: 1518    Procedure: EGD Diagnosis:       Gastro-esophageal reflux disease without esophagitis      Gastro-esophageal reflux disease without esophagitis    Scheduled Providers: Jumana Shaffer MD; Gene Thornton MD Responsible Provider: Gene Thornton MD    Anesthesia Type: MAC ASA Status: 3            Anesthesia Type: MAC    Vitals Value Taken Time   /64 11/13/23 1518   Temp 36 11/13/23 1518   Pulse 72 11/13/23 1518   Resp 13 11/13/23 1518   SpO2 96 11/13/23 1518       Anesthesia Post Evaluation    Patient location during evaluation: PACU  Patient participation: complete - patient participated  Level of consciousness: awake and alert  Pain score: 0  Pain management: adequate  Multimodal analgesia pain management approach  Airway patency: patent  Cardiovascular status: acceptable and blood pressure returned to baseline  Respiratory status: acceptable  Hydration status: acceptable        No notable events documented.

## 2023-11-13 NOTE — ANESTHESIA PREPROCEDURE EVALUATION
Patient: Noah Allen    Procedure Information       Anesthesia Start Date/Time: 11/13/23 1449    Scheduled providers: Jumana Shaffer MD; Gene Thornton MD    Procedure: EGD    Location: [facility]            Relevant Problems   Cardiovascular   (+) Hyperlipidemia   (+) Hypertension   (+) Ischemic finger   (+) Peripheral vascular disease (CMS/HCC)      Endocrine   (+) Obesity   (+) Type 2 diabetes mellitus without complication, without long-term current use of insulin (CMS/HCC)      GI   (+) Gastroesophageal reflux disease      /Renal   (+) CKD (chronic kidney disease)   (+) Stage 3 chronic kidney disease, unspecified whether stage 3a or 3b CKD (CMS/HCC)      Neuro/Psych   (+) Cervical radiculopathy   (+) Lumbar radiculopathy   (+) Situational depression      Pulmonary   (+) SAAD (obstructive sleep apnea)      Hematology   (+) Anemia, iron deficiency   (+) Anemia, normocytic normochromic      Musculoskeletal   (+) Cervical spondylosis   (+) Cervical stenosis of spine      Other   (+) Lateral epicondylitis of both elbows       Clinical information reviewed:    Allergies  Meds               NPO Detail:  NPO/Void Status  Date of Last Liquid: 11/12/23  Time of Last Liquid: 1600  Date of Last Solid: 11/12/23  Time of Last Solid: 1600         Physical Exam    Airway  Mallampati: II  TM distance: >3 FB  Neck ROM: full     Cardiovascular   Rhythm: regular  Rate: normal     Dental - normal exam     Pulmonary   Breath sounds clear to auscultation     Abdominal        Anesthesia Plan    ASA 3     MAC     The patient is not a current smoker.  Patient was not previously instructed to abstain from smoking on day of procedure.  Patient did not smoke on day of procedure.    intravenous induction   Postoperative administration of opioids is intended.  Anesthetic plan and risks discussed with patient.  Use of blood products discussed with patient who consented to blood products.

## 2023-11-17 ENCOUNTER — ANTICOAGULATION - WARFARIN VISIT (OUTPATIENT)
Dept: PHARMACY | Facility: CLINIC | Age: 53
End: 2023-11-17
Payer: MEDICARE

## 2023-11-17 DIAGNOSIS — I73.1 THROMBOANGIITIS OBLITERANS (BUERGER'S DISEASE) (CMS-HCC): Primary | ICD-10-CM

## 2023-11-17 LAB
POC INR: 1.1
POC PROTHROMBIN TIME: NORMAL

## 2023-11-17 PROCEDURE — 85610 PROTHROMBIN TIME: CPT | Performed by: PHARMACIST

## 2023-11-17 PROCEDURE — 99212 OFFICE O/P EST SF 10 MIN: CPT | Performed by: PHARMACIST

## 2023-11-17 NOTE — PROGRESS NOTES
Noah Allen is a 53 y.o. male with history of Thromboangiitis obliterans who presents today for anticoagulation monitoring and adjustment.  INR 1.1 is sub-therapeutic for this patient (goal range 2-3) and is reflective of 45 mg TWD  Patient verifies current dosing regimen, patient able to verbally recall dose  Patient reports 5  missed doses since last INR - HOLDING X 5 days prior to procedure and then HELD X 1 day after surgery   Last INR 2.9 on 11/7/23 (1.5 week interval) - due to procedure   Patient denies s/sx clotting and/or stroke  Patient denies hematuria, epistaxis, rectal bleeding  Patient denies changes in diet, alcohol, or tobacco use  Vegetable intake consistent from week to week  Reviewed medication list and drug allergies with patient, updated any medication additions or modifications accordingly  Acetaminophen intake: no changes   Patient also denies any pending medical or dental procedures scheduled at this time    Discussed with patient regarding need for Lovenox, patient is refusing to do more Lovenox shots, as such, will give booster doses.     Patient was instructed to boost with warfarin 15mg today only, then 11.5mg tomorrow only, then continue with previous maintenance dose of warfarin 45mg TWD and RTC 2 weeks    Roxie Payan, PharmD, BCPS

## 2023-11-21 ENCOUNTER — LAB (OUTPATIENT)
Dept: LAB | Facility: LAB | Age: 53
End: 2023-11-21
Payer: MEDICARE

## 2023-11-21 DIAGNOSIS — Z00.00 HEALTH CARE MAINTENANCE: ICD-10-CM

## 2023-11-21 DIAGNOSIS — N18.30 STAGE 3 CHRONIC KIDNEY DISEASE, UNSPECIFIED WHETHER STAGE 3A OR 3B CKD (MULTI): ICD-10-CM

## 2023-11-21 LAB
ANION GAP SERPL CALC-SCNC: 14 MMOL/L (ref 10–20)
BUN SERPL-MCNC: 9 MG/DL (ref 6–23)
CALCIUM SERPL-MCNC: 9.6 MG/DL (ref 8.6–10.6)
CHLORIDE SERPL-SCNC: 103 MMOL/L (ref 98–107)
CO2 SERPL-SCNC: 29 MMOL/L (ref 21–32)
CREAT SERPL-MCNC: 0.89 MG/DL (ref 0.5–1.3)
EST. AVERAGE GLUCOSE BLD GHB EST-MCNC: 117 MG/DL
GFR SERPL CREATININE-BSD FRML MDRD: >90 ML/MIN/1.73M*2
GLUCOSE SERPL-MCNC: 139 MG/DL (ref 74–99)
HBA1C MFR BLD: 5.7 %
POTASSIUM SERPL-SCNC: 4.2 MMOL/L (ref 3.5–5.3)
SODIUM SERPL-SCNC: 142 MMOL/L (ref 136–145)

## 2023-11-21 PROCEDURE — 36415 COLL VENOUS BLD VENIPUNCTURE: CPT

## 2023-11-21 PROCEDURE — 80048 BASIC METABOLIC PNL TOTAL CA: CPT

## 2023-11-21 PROCEDURE — 83036 HEMOGLOBIN GLYCOSYLATED A1C: CPT

## 2023-11-30 ENCOUNTER — OFFICE VISIT (OUTPATIENT)
Dept: PRIMARY CARE | Facility: CLINIC | Age: 53
End: 2023-11-30
Payer: MEDICARE

## 2023-11-30 VITALS
BODY MASS INDEX: 25.73 KG/M2 | WEIGHT: 190 LBS | DIASTOLIC BLOOD PRESSURE: 80 MMHG | SYSTOLIC BLOOD PRESSURE: 116 MMHG | HEIGHT: 72 IN

## 2023-11-30 DIAGNOSIS — M79.671 RIGHT FOOT PAIN: ICD-10-CM

## 2023-11-30 DIAGNOSIS — E11.9 TYPE 2 DIABETES MELLITUS WITHOUT COMPLICATIONS (MULTI): ICD-10-CM

## 2023-11-30 DIAGNOSIS — Z87.891 FORMER CIGARETTE SMOKER: ICD-10-CM

## 2023-11-30 DIAGNOSIS — M79.643 PAIN OF HAND, UNSPECIFIED LATERALITY: Primary | ICD-10-CM

## 2023-11-30 DIAGNOSIS — N18.30 STAGE 3 CHRONIC KIDNEY DISEASE, UNSPECIFIED WHETHER STAGE 3A OR 3B CKD (MULTI): ICD-10-CM

## 2023-11-30 DIAGNOSIS — I10 PRIMARY HYPERTENSION: ICD-10-CM

## 2023-11-30 DIAGNOSIS — E11.9 TYPE 2 DIABETES MELLITUS WITHOUT COMPLICATION, WITHOUT LONG-TERM CURRENT USE OF INSULIN (MULTI): ICD-10-CM

## 2023-11-30 DIAGNOSIS — I73.1 THROMBOANGIITIS OBLITERANS (BUERGER'S DISEASE) (CMS-HCC): ICD-10-CM

## 2023-11-30 DIAGNOSIS — Z00.00 HEALTH CARE MAINTENANCE: ICD-10-CM

## 2023-11-30 PROCEDURE — 90686 IIV4 VACC NO PRSV 0.5 ML IM: CPT | Performed by: STUDENT IN AN ORGANIZED HEALTH CARE EDUCATION/TRAINING PROGRAM

## 2023-11-30 PROCEDURE — 99214 OFFICE O/P EST MOD 30 MIN: CPT | Performed by: STUDENT IN AN ORGANIZED HEALTH CARE EDUCATION/TRAINING PROGRAM

## 2023-11-30 PROCEDURE — 3079F DIAST BP 80-89 MM HG: CPT | Performed by: STUDENT IN AN ORGANIZED HEALTH CARE EDUCATION/TRAINING PROGRAM

## 2023-11-30 PROCEDURE — 1036F TOBACCO NON-USER: CPT | Performed by: STUDENT IN AN ORGANIZED HEALTH CARE EDUCATION/TRAINING PROGRAM

## 2023-11-30 PROCEDURE — 3044F HG A1C LEVEL LT 7.0%: CPT | Performed by: STUDENT IN AN ORGANIZED HEALTH CARE EDUCATION/TRAINING PROGRAM

## 2023-11-30 PROCEDURE — G0296 VISIT TO DETERM LDCT ELIG: HCPCS | Performed by: STUDENT IN AN ORGANIZED HEALTH CARE EDUCATION/TRAINING PROGRAM

## 2023-11-30 PROCEDURE — 4010F ACE/ARB THERAPY RXD/TAKEN: CPT | Performed by: STUDENT IN AN ORGANIZED HEALTH CARE EDUCATION/TRAINING PROGRAM

## 2023-11-30 PROCEDURE — G0008 ADMIN INFLUENZA VIRUS VAC: HCPCS | Performed by: STUDENT IN AN ORGANIZED HEALTH CARE EDUCATION/TRAINING PROGRAM

## 2023-11-30 PROCEDURE — 3074F SYST BP LT 130 MM HG: CPT | Performed by: STUDENT IN AN ORGANIZED HEALTH CARE EDUCATION/TRAINING PROGRAM

## 2023-11-30 RX ORDER — METFORMIN HYDROCHLORIDE 500 MG/1
500 TABLET, EXTENDED RELEASE ORAL 2 TIMES DAILY
Qty: 180 TABLET | Refills: 1 | Status: SHIPPED | OUTPATIENT
Start: 2023-11-30

## 2023-11-30 NOTE — PROGRESS NOTES
Subjective   Patient ID: Noah Allen is a 53 y.o. male who presents for Follow-up.    HPI   Routine fu.  Med refill.  Tomorrow will zheng his 1 year anniversary of abstinence from alcohol.    He feels well in general.    He wants to discuss diabetes and recent labs.     He is trying to eat a healthy diet.    He had hand/knuckle pain, wants to see ortho.  He has toenail fungus and foot pain, wants to see podiatry.  Review of Systems  12-point ROS reviewed and was negative unless otherwise noted in HPI.    Objective   There were no vitals taken for this visit.    Physical Exam  GEN: conversant, NAD  HEENT: PERRL, EOMI, MMM  NECK: supple, no carotid bruits appreciated b/l  CV: S1, S2, RRR  PULM: CTAB  ABD: soft, NT, ND  NEURO: no new gross focal deficits  EXT: no sig LE edema  PSYCH: appropriate affect    Assessment/Plan     #microscopic hematuria  -Follows with Nephrology and urology     #T2DM  :: A1c improved to 5.7% after alcohol cessation and weight loss   #previous EtOH Use DIsorder  - Following w/ [facility]ym[facility]AL[facility], completed IOP  - Has abstained from EtOH since 12/1/2022     #Depression- stable on Duloxetine, follows with psychiatry.      #HTN- stable, maintained on amlodipine and losartan.     #Neck Surgeries, Neuropathy- Follows at [place], now awaiting new provider given closure of SV, maintained on baclofen and gabapentin.   #Buerger's Disease- Maintained on coumadin, quit smoking 5 years ago, follows with INR clinic  #GERD, Salinas's esophagus- On PPI, due for repeat EGD in summer  #HLD- On atorvastatin 40mg  #CKD- Follows with nephrology at [facility]   #SAAD: seeing sleep medicine, CPAP has been advised    #Former cigarette smoker: quit 5 years ago. I discussed smoking history/status that determined patient meets criteria for lung cancer screening with low dose CT scan. By using shared decision making, we determined that the patient will benefit from the screening, including a  discussion of benefits and harms of screening, follow-up diagnostic testing, over-diagnosis, false positive rate, and total radiation exposure. I counseled the patient on the importance of adhering to annual lung cancer low dose CT screening, the impact of comorbidities, and his or her ability or willingness to undergo diagnosis and treatment if abnormalities are discovered. I provided patient an order for low dose CT lung cancer screening.       #[facility]  Has had colonoscopy 2020 (repeat due in 5 years), repeat EGD for Salinas's due July 2023 (pt prefers to call GI provider to schedule this), has COVID vaccines  LDCT screening for lung cancer ordered     RTC- 3-4 month

## 2023-12-01 ENCOUNTER — ANTICOAGULATION - WARFARIN VISIT (OUTPATIENT)
Dept: PHARMACY | Facility: CLINIC | Age: 53
End: 2023-12-01
Payer: MEDICARE

## 2023-12-01 ENCOUNTER — APPOINTMENT (OUTPATIENT)
Dept: PHARMACY | Facility: CLINIC | Age: 53
End: 2023-12-01
Payer: MEDICARE

## 2023-12-01 DIAGNOSIS — I73.1 THROMBOANGIITIS OBLITERANS (BUERGER'S DISEASE) (CMS-HCC): Primary | ICD-10-CM

## 2023-12-01 LAB
POC INR: 2.4
POC PROTHROMBIN TIME: NORMAL

## 2023-12-01 PROCEDURE — 99212 OFFICE O/P EST SF 10 MIN: CPT

## 2023-12-01 PROCEDURE — 85610 PROTHROMBIN TIME: CPT | Mod: QW

## 2023-12-01 NOTE — PROGRESS NOTES
No bleeding or unusual bruising.  Medications and doses verified.  No scheduled procedures at this time.  INR=2.4   Plan: Continue same weekly dose.  Follow up INR check in 5 weeks.

## 2023-12-14 ENCOUNTER — TELEPHONE (OUTPATIENT)
Dept: GASTROENTEROLOGY | Facility: HOSPITAL | Age: 53
End: 2023-12-14
Payer: MEDICARE

## 2023-12-14 NOTE — TELEPHONE ENCOUNTER
Spoke with patient about results . Needs follow up EGD in 6 months to 1 year due to hyperplastic polyp. He will establish care with provider. He is going to reduce to omeprazole 40mg for previously found Barretts however this time EGD did not show Barretts esophagus.

## 2023-12-18 DIAGNOSIS — I73.1 THROMBOANGIITIS OBLITERANS (BUERGER'S DISEASE) (CMS-HCC): ICD-10-CM

## 2023-12-18 DIAGNOSIS — I10 ESSENTIAL (PRIMARY) HYPERTENSION: ICD-10-CM

## 2023-12-18 RX ORDER — WARFARIN 7.5 MG/1
7.5 TABLET ORAL NIGHTLY
Qty: 90 TABLET | Refills: 0 | Status: SHIPPED | OUTPATIENT
Start: 2023-12-18 | End: 2024-03-14

## 2023-12-18 RX ORDER — AMLODIPINE BESYLATE 10 MG/1
10 TABLET ORAL DAILY
Qty: 90 TABLET | Refills: 0 | Status: SHIPPED | OUTPATIENT
Start: 2023-12-18 | End: 2024-03-28 | Stop reason: SDUPTHER

## 2023-12-20 ENCOUNTER — APPOINTMENT (OUTPATIENT)
Dept: UROLOGY | Facility: HOSPITAL | Age: 53
End: 2023-12-20
Payer: MEDICARE

## 2023-12-22 ENCOUNTER — ANCILLARY PROCEDURE (OUTPATIENT)
Dept: RADIOLOGY | Facility: CLINIC | Age: 53
End: 2023-12-22
Payer: MEDICARE

## 2023-12-22 DIAGNOSIS — Z87.891 FORMER CIGARETTE SMOKER: ICD-10-CM

## 2023-12-22 PROCEDURE — 71271 CT THORAX LUNG CANCER SCR C-: CPT

## 2023-12-22 PROCEDURE — 71271 CT THORAX LUNG CANCER SCR C-: CPT | Performed by: RADIOLOGY

## 2024-01-02 ENCOUNTER — ANCILLARY PROCEDURE (OUTPATIENT)
Dept: RADIOLOGY | Facility: CLINIC | Age: 54
End: 2024-01-02
Payer: MEDICARE

## 2024-01-02 DIAGNOSIS — M79.641 PAIN OF RIGHT HAND: ICD-10-CM

## 2024-01-02 PROCEDURE — 73130 X-RAY EXAM OF HAND: CPT | Mod: RIGHT SIDE | Performed by: RADIOLOGY

## 2024-01-02 PROCEDURE — 73130 X-RAY EXAM OF HAND: CPT | Mod: RT

## 2024-01-03 ENCOUNTER — OFFICE VISIT (OUTPATIENT)
Dept: ORTHOPEDIC SURGERY | Facility: CLINIC | Age: 54
End: 2024-01-03
Payer: MEDICARE

## 2024-01-03 DIAGNOSIS — M19.041 DEGENERATIVE ARTHRITIS OF METACARPOPHALANGEAL JOINT OF MIDDLE FINGER OF RIGHT HAND: Primary | ICD-10-CM

## 2024-01-03 DIAGNOSIS — M79.641 PAIN OF RIGHT HAND: ICD-10-CM

## 2024-01-03 PROCEDURE — 99204 OFFICE O/P NEW MOD 45 MIN: CPT | Performed by: ORTHOPAEDIC SURGERY

## 2024-01-03 PROCEDURE — 1036F TOBACCO NON-USER: CPT | Performed by: ORTHOPAEDIC SURGERY

## 2024-01-03 PROCEDURE — 4010F ACE/ARB THERAPY RXD/TAKEN: CPT | Performed by: ORTHOPAEDIC SURGERY

## 2024-01-03 PROCEDURE — 99214 OFFICE O/P EST MOD 30 MIN: CPT | Performed by: ORTHOPAEDIC SURGERY

## 2024-01-03 NOTE — LETTER
January 20, 2024     Braulio Jain MD  [address] [facility] [address]  [facility] [address]    Patient: Noah Allen   YOB: 1970   Date of Visit: 1/3/2024       Dear Dr. Braulio Jain MD:    Thank you for referring Noah Allen to me for evaluation. Below are my notes for this consultation.  If you have questions, please do not hesitate to call me. I look forward to following your patient along with you.       Sincerely,     Jas Bernstein MD      CC: No Recipients  ______________________________________________________________________________________    CHIEF COMPLAINT         Right hand pain    ASSESSMENT + PLAN    Right long finger MP osteoarthritis flare    The nature of MP joint arthritis was reviewed, along with the natural history and expected waxing and waning course.  I reviewed the options for management, including observation, nonsteroidals, activity modification, splinting, oral steroids, cortisone injection, or surgical joint fusion or replacement, along with the major risks and benefits, and likely success rates of each.     The patient did not want anything invasive at this time, and will continue with activity modification, splints, and nonsteroidals as needed, and followup with any concerns.        HISTORY OF PRESENT ILLNESS       Patient is a 53 y.o. right-hand dominant male , now on disability, who presents today for evaluation of right hand pain.  This is at the long finger MP joint.  It has been present for about 3 months.  No single specific recalled trauma around time of onset, though he has always used his hands for work and forceful activities at home.  No popping, clicking, or instability.  Pain is aching character but, becoming sharper with use.  It does not radiate.  No numbness or tingling.  It does not wake him from sleep.  It does limit his ADLs.    He is diabetic on metformin.  He is not hypothyroid.  He does take  gabapentin.  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  Thankfully, he has stopped smoking.      REVIEW OF SYSTEMS       A 30-item multi-system Review Of Systems was obtained on today's intake form.  This was reviewed with the patient and is correct.  The pertinent positives and negatives are listed above.  The form has been scanned separately into the medical record.      PHYSICAL EXAM    Constitutional:    Appears stated age. Well-developed and well-nourished male in no acute distress.  Psychiatric:         Pleasant normal mood and affect. Behavior is appropriate for the situation.   Head:                   Normocephalic and atraumatic.  Eyes:                    Pupils are equal and round.  Cardiovascular:  2+ radial and ulnar pulses. Fingers well-perfused.  Respiratory:        Effort normal. No respiratory distress. Speaking in complete sentences.  Neurologic:       Alert and oriented to person, place, and time.  Skin:                Skin is intact, warm and dry.  Hematologic / Lymphatic:    No lymphedema or lymphangitis.    Extremities / Musculoskeletal:                      Skin of the right hand and wrist is intact with no erythema, ecchymosis, or diffuse swelling.  No threatened ischemic areas or trophic changes.  There is some focal swelling around the long MP dorsally and in the third webspace.  There are small bone spurs palpable.  Joint is stable to varus and valgus stress.  Good sagittal plane balance.  Tendons are intact by individual testing and remain centralized.  Positive MP grind.  Negative Quintanilla.  Negative midcarpal shift.  Symmetric wrist and forearm motion.  Sensation intact to light touch in all distributions.  Capillary refill less than 2 seconds.      IMAGING / LABS / EMGs           X-rays right hand from [place] yesterday were independently interpreted by me today and show no acute fracture, subluxation, or foreign body.  Joint spaces are concentric and well-maintained except  for long and minimally index MP joints that have small ulnar bone spurs and a little osteoarthritic narrowing.      Past Medical History:   Diagnosis Date   • Anemia, unspecified 06/29/2020    Mild anemia   • Contusion of left front wall of thorax, initial encounter 10/23/2021    Contusion of left chest wall, initial encounter   • Disease of spinal cord, unspecified (CMS/HCC) 04/02/2018    Cervical myelopathy   • Encounter for immunization 10/13/2020    Need for tetanus, diphtheria, and acellular pertussis (Tdap) vaccine   • Encounter for therapeutic drug level monitoring 04/15/2021    Encounter for monitoring Coumadin therapy   • Essential (primary) hypertension 07/29/2019    HTN (hypertension), benign   • Essential (primary) hypertension 11/11/2019    HTN (hypertension), benign   • Essential (primary) hypertension 09/25/2018    HTN (hypertension), benign   • Essential (primary) hypertension 01/28/2019    HTN (hypertension), benign   • Long term (current) use of anticoagulants 04/04/2018    Chronic anticoagulation   • Pain in right wrist 05/21/2021    Right wrist pain   • Personal history of nicotine dependence 04/04/2018    History of tobacco abuse   • Personal history of nicotine dependence 04/08/2019    History of nicotine dependence   • Personal history of other (healed) physical injury and trauma 05/21/2021    History of sprain of wrist   • Personal history of other drug therapy 09/25/2018    History of influenza vaccination   • Personal history of other drug therapy 11/11/2019    History of influenza vaccination   • Personal history of other endocrine, nutritional and metabolic disease 02/13/2019    History of hyperlipidemia       Medication Documentation Review Audit       Reviewed by Jas Bernstein MD (Physician) on 01/20/24 at 1335      Medication Order Taking? Sig Documenting Provider Last Dose Status   acetaminophen (Tylenol) 325 mg tablet [id number]  Take 2 tablets (650 mg) by mouth every 6 hours if  needed for mild pain (1 - 3). Carter Spencer MD  Active   acetaminophen (Tylenol) 500 mg tablet [id number] No Take 1 tablet (500 mg) by mouth every 6 hours if needed. Historical Provider, MD Unknown Active   amLODIPine (Norvasc) 10 mg tablet [id number]  Take 1 tablet (10 mg) by mouth once daily. Braulio Jain MD  Active   aspirin 81 mg EC tablet [id number] No Take 1 tablet (81 mg) by mouth once daily. Summer Wynn MD Past Week Active   atorvastatin (Lipitor) 40 mg tablet [id number]  TAKE 1 TABLET BY MOUTH EVERY DAY Braulio Jain MD  Active   bacitracin ointment 1 Application [id number]   Cartre Spencer MD  Active   baclofen (Lioresal) 10 mg tablet [id number] No Take 1 tablet (10 mg) by mouth 3 times a day. Summer Wynn MD 11/13/2023 Active   baclofen (Lioresal) 10 mg tablet [id number] No Take 1 tablet (10 mg) by mouth 2 times a day as needed. Summer Wynn MD 11/13/2023 Active   cholecalciferol (Vitamin D-3) 25 MCG (1000 UT) capsule [id number] No Take 1 capsule (25 mcg) by mouth once daily. Summer Wynn MD 11/13/2023 Active   cholecalciferol (Vitamin D-3) 50 mcg (2,000 unit) capsule [id number] No Take 1 tablet by mouth early in the morning.. Summer Wynn MD 11/13/2023 Active   docusate sodium (Colace) 100 mg capsule [id number] No Take 1 capsule (100 mg) by mouth 2 times a day. Summer Wynn MD Past Week Active   DULoxetine (Cymbalta) 60 mg DR capsule [id number] No Take 1 capsule (60 mg) by mouth 2 times a day. Summer Wynn MD 11/13/2023 Active   enoxaparin (Lovenox) 80 mg/0.8 mL syringe [id number] No Inject 0.8 mL (80 mg) under the skin every 12 hours. Summer Wynn MD 11/12/2023 Active   ferrous sulfate 325 (65 Fe) MG EC tablet [id number] No Take 1 tablet (65 mg) by mouth once daily with a meal. Summer Wynn MD 11/12/2023 Active   ferrous sulfate 325 (65 Fe) MG tablet [id number] No Take 1 tablet (325 mg) by mouth 2 times a day. Historical Provider, MD  11/12/2023 Active   gabapentin (Neurontin) 300 mg capsule [id number] No Take 2 capsules (600 mg) by mouth every 8 hours. Historical Provider, MD 11/13/2023 Active   gabapentin (Neurontin) 300 mg capsule [id number] No Take 3 capsules (900 mg) by mouth 2 times a day. Take as directed Historical Provider, MD 11/13/2023 Active   losartan (Cozaar) 100 mg tablet [id number] No Take 1 tablet (100 mg) by mouth once daily. Historical Provider, MD 11/13/2023 Active   metFORMIN  mg 24 hr tablet [id number]  Take 1 tablet (500 mg) by mouth 2 times a day. Braulio Jain MD  Active   omeprazole (PriLOSEC) 40 mg DR capsule [id number] No Take 1 capsule (40 mg) by mouth 2 times a day. Historical Provider, MD 11/13/2023 Active   oxyCODONE (Roxicodone) 5 mg immediate release tablet [id number]  Take 1 tablet (5 mg) by mouth every 6 hours if needed for severe pain (7 - 10). Carter Spencer MD  Active   sildenafil (Viagra) 100 mg tablet [id number] No Take 1 tablet (100 mg) by mouth once daily as needed for erectile dysfunction. Bartolo Torres MD Past Week Active   tadalafil (Cialis) 20 mg tablet [id number]  Take 1 tablet (20 mg) by mouth once daily as needed for erectile dysfunction. Bartolo Torres MD  Active   topiramate (Topamax) 25 mg tablet [id number] No Take 2 tablets (50 mg) by mouth once daily at bedtime. Historical Provider, MD 11/13/2023 Active   warfarin (Coumadin) 7.5 mg tablet [id number]  Take 1 tablet (7.5 mg) by mouth once daily at bedtime. Braulio Jain MD  Active                    Allergies   Allergen Reactions   • Penicillins Anaphylaxis and Unknown   • Sulfa (Sulfonamide Antibiotics) Anaphylaxis and Unknown   • Lisinopril Cough     cough       Social History     Socioeconomic History   • Marital status:      Spouse name: Not on file   • Number of children: Not on file   • Years of education: Not on file   • Highest education level: Not on file   Occupational History   • Not on file   Tobacco Use   • Smoking  status: Former     Types: Cigarettes   • Smokeless tobacco: Never   Vaping Use   • Vaping Use: Never used   Substance and Sexual Activity   • Alcohol use: Not Currently     Comment: stopped drinking 10 months ago   • Drug use: Not Currently   • Sexual activity: Not on file   Other Topics Concern   • Not on file   Social History Narrative   • Not on file     Social Determinants of Health     Financial Resource Strain: Not on file   Food Insecurity: Not on file   Transportation Needs: Not on file   Physical Activity: Not on file   Stress: Not on file   Social Connections: Not on file   Intimate Partner Violence: Not on file   Housing Stability: Not on file       Past Surgical History:   Procedure Laterality Date   • KNEE SURGERY  06/02/2015    Knee Surgery   • OTHER SURGICAL HISTORY  01/26/2018    Anterior Spinal Diskectomy, Osteophytectomy Cerv Interspace         Electronically Signed      BOBBI Bernstein MD      Orthopaedic Hand Surgery      [phone]

## 2024-01-05 ENCOUNTER — CLINICAL SUPPORT (OUTPATIENT)
Dept: PHARMACY | Facility: CLINIC | Age: 54
End: 2024-01-05
Payer: MEDICARE

## 2024-01-05 ENCOUNTER — ANTICOAGULATION - WARFARIN VISIT (OUTPATIENT)
Dept: PHARMACY | Facility: CLINIC | Age: 54
End: 2024-01-05
Payer: MEDICARE

## 2024-01-05 DIAGNOSIS — I73.1 THROMBOANGIITIS OBLITERANS (BUERGER'S DISEASE) (CMS-HCC): Primary | ICD-10-CM

## 2024-01-05 LAB
POC INR: 3.5
POC PROTHROMBIN TIME: NORMAL

## 2024-01-05 PROCEDURE — 99212 OFFICE O/P EST SF 10 MIN: CPT | Performed by: PHARMACIST

## 2024-01-05 PROCEDURE — 85610 PROTHROMBIN TIME: CPT | Mod: QW | Performed by: PHARMACIST
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
## 2024-01-11 DIAGNOSIS — E78.5 HYPERLIPIDEMIA, UNSPECIFIED: ICD-10-CM

## 2024-01-11 RX ORDER — ATORVASTATIN CALCIUM 40 MG/1
TABLET, FILM COATED ORAL
Qty: 90 TABLET | Refills: 0 | Status: SHIPPED | OUTPATIENT
Start: 2024-01-11 | End: 2024-03-28 | Stop reason: SDUPTHER

## 2024-01-19 ENCOUNTER — TELEMEDICINE (OUTPATIENT)
Dept: UROLOGY | Facility: HOSPITAL | Age: 54
End: 2024-01-19
Payer: MEDICARE

## 2024-01-19 DIAGNOSIS — N52.9 MALE ERECTILE DISORDER OF ORGANIC ORIGIN: Primary | ICD-10-CM

## 2024-01-19 PROCEDURE — 99214 OFFICE O/P EST MOD 30 MIN: CPT | Performed by: UROLOGY

## 2024-01-19 RX ORDER — TADALAFIL 20 MG/1
20 TABLET ORAL DAILY PRN
Qty: 30 TABLET | Refills: 3 | Status: SHIPPED | OUTPATIENT
Start: 2024-01-19 | End: 2024-05-29 | Stop reason: ALTCHOICE
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
## 2024-01-20 NOTE — PROGRESS NOTES
CHIEF COMPLAINT         Right hand pain    ASSESSMENT + PLAN    Right long finger MP osteoarthritis flare    The nature of MP joint arthritis was reviewed, along with the natural history and expected waxing and waning course.  I reviewed the options for management, including observation, nonsteroidals, activity modification, splinting, oral steroids, cortisone injection, or surgical joint fusion or replacement, along with the major risks and benefits, and likely success rates of each.     The patient did not want anything invasive at this time, and will continue with activity modification, splints, and nonsteroidals as needed, and followup with any concerns.        HISTORY OF PRESENT ILLNESS       Patient is a 53 y.o. right-hand dominant male , now on disability, who presents today for evaluation of right hand pain.  This is at the long finger MP joint.  It has been present for about 3 months.  No single specific recalled trauma around time of onset, though he has always used his hands for work and forceful activities at home.  No popping, clicking, or instability.  Pain is aching character but, becoming sharper with use.  It does not radiate.  No numbness or tingling.  It does not wake him from sleep.  It does limit his ADLs.    He is diabetic on metformin.  He is not hypothyroid.  He does take gabapentin.  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  Thankfully, he has stopped smoking.      REVIEW OF SYSTEMS       A 30-item multi-system Review Of Systems was obtained on today's intake form.  This was reviewed with the patient and is correct.  The pertinent positives and negatives are listed above.  The form has been scanned separately into the medical record.      PHYSICAL EXAM    Constitutional:    Appears stated age. Well-developed and well-nourished male in no acute distress.  Psychiatric:         Pleasant normal mood and affect. Behavior is appropriate for the situation.   Head:              Problem: Patient Care Overview  Goal: Plan of Care Review  Outcome: Ongoing (interventions implemented as appropriate)    04/26/17 1549   Coping/Psychosocial   Plan Of Care Reviewed With patient         Problem: Infection, Risk/Actual (Adult)  Goal: Infection Prevention/Resolution  Patient will demonstrate the desired outcomes by discharge/transition of care.   Outcome: Ongoing (interventions implemented as appropriate)    04/26/17 1549   Infection, Risk/Actual (Adult)   Infection Prevention/Resolution making progress toward outcome         Problem: Fall Risk (Adult)  Goal: Absence of Falls  Patient will demonstrate the desired outcomes by discharge/transition of care.   Outcome: Ongoing (interventions implemented as appropriate)    04/26/17 1549   Fall Risk (Adult)   Absence of Falls making progress toward outcome         Problem: Pressure Ulcer Risk (Huy Scale) (Adult,Obstetrics,Pediatric)  Goal: Skin Integrity  Patient will demonstrate the desired outcomes by discharge/transition of care.   Outcome: Ongoing (interventions implemented as appropriate)    04/26/17 1549   Pressure Ulcer Risk (Huy Scale) (Adult,Obstetrics,Pediatric)   Skin Integrity making progress toward outcome         Problem: Mobility, Physical Impaired (Adult)  Goal: Enhanced Mobility Skills  Patient will demonstrate the desired outcomes by discharge/transition of care.   Outcome: Ongoing (interventions implemented as appropriate)    04/26/17 1549   Mobility, Physical Impaired (Adult)   Enhanced Mobility Skills making progress toward outcome         Comments:   Patient monitored every 1 to 2 hours for pain and safety.  Safety  Maintained.  Patient instructed to call for assistance.  Call  Light and persoanl items in reach.         Normocephalic and atraumatic.  Eyes:                    Pupils are equal and round.  Cardiovascular:  2+ radial and ulnar pulses. Fingers well-perfused.  Respiratory:        Effort normal. No respiratory distress. Speaking in complete sentences.  Neurologic:       Alert and oriented to person, place, and time.  Skin:                Skin is intact, warm and dry.  Hematologic / Lymphatic:    No lymphedema or lymphangitis.    Extremities / Musculoskeletal:                      Skin of the right hand and wrist is intact with no erythema, ecchymosis, or diffuse swelling.  No threatened ischemic areas or trophic changes.  There is some focal swelling around the long MP dorsally and in the third webspace.  There are small bone spurs palpable.  Joint is stable to varus and valgus stress.  Good sagittal plane balance.  Tendons are intact by individual testing and remain centralized.  Positive MP grind.  Negative Quintanilla.  Negative midcarpal shift.  Symmetric wrist and forearm motion.  Sensation intact to light touch in all distributions.  Capillary refill less than 2 seconds.      IMAGING / LABS / EMGs           X-rays right hand from [facility] yesterday were independently interpreted by me today and show no acute fracture, subluxation, or foreign body.  Joint spaces are concentric and well-maintained except for long and minimally index MP joints that have small ulnar bone spurs and a little osteoarthritic narrowing.      Past Medical History:   Diagnosis Date    Anemia, unspecified 06/29/2020    Mild anemia    Contusion of left front wall of thorax, initial encounter 10/23/2021    Contusion of left chest wall, initial encounter    Disease of spinal cord, unspecified (CMS/[facility]) 04/02/2018    Cervical myelopathy    Encounter for immunization 10/13/2020    Need for tetanus, diphtheria, and acellular pertussis (Tdap) vaccine    Encounter for therapeutic drug level monitoring 04/15/2021    Encounter for monitoring Coumadin  therapy    Essential (primary) hypertension 07/29/2019    HTN (hypertension), benign    Essential (primary) hypertension 11/11/2019    HTN (hypertension), benign    Essential (primary) hypertension 09/25/2018    HTN (hypertension), benign    Essential (primary) hypertension 01/28/2019    HTN (hypertension), benign    Long term (current) use of anticoagulants 04/04/2018    Chronic anticoagulation    Pain in right wrist 05/21/2021    Right wrist pain    Personal history of nicotine dependence 04/04/2018    History of tobacco abuse    Personal history of nicotine dependence 04/08/2019    History of nicotine dependence    Personal history of other (healed) physical injury and trauma 05/21/2021    History of sprain of wrist    Personal history of other drug therapy 09/25/2018    History of influenza vaccination    Personal history of other drug therapy 11/11/2019    History of influenza vaccination    Personal history of other endocrine, nutritional and metabolic disease 02/13/2019    History of hyperlipidemia       Medication Documentation Review Audit       Reviewed by Jas Bernstein MD (Physician) on 01/20/24 at 1335      Medication Order Taking? Sig Documenting Provider Last Dose Status   acetaminophen (Tylenol) 325 mg tablet [id number]  Take 2 tablets (650 mg) by mouth every 6 hours if needed for mild pain (1 - 3). Carter Spencer MD  Active   acetaminophen (Tylenol) 500 mg tablet [id number] No Take 1 tablet (500 mg) by mouth every 6 hours if needed. Historical Provider, MD Unknown Active   amLODIPine (Norvasc) 10 mg tablet [id number]  Take 1 tablet (10 mg) by mouth once daily. Braulio Jain MD  Active   aspirin 81 mg EC tablet [id number] No Take 1 tablet (81 mg) by mouth once daily. Historical Provider, MD Past Week Active   atorvastatin (Lipitor) 40 mg tablet [id number]  TAKE 1 TABLET BY MOUTH EVERY DAY Braulio Jain MD  Active   bacitracin ointment 1 Application [id number]   Carter Spencer MD  Active    baclofen (Lioresal) 10 mg tablet [id number] No Take 1 tablet (10 mg) by mouth 3 times a day. Summer Provider, MD 11/13/2023 Active   baclofen (Lioresal) 10 mg tablet [id number] No Take 1 tablet (10 mg) by mouth 2 times a day as needed. Summer Wynn MD 11/13/2023 Active   cholecalciferol (Vitamin D-3) 25 MCG (1000 UT) capsule [id number] No Take 1 capsule (25 mcg) by mouth once daily. Summer Wynn MD 11/13/2023 Active   cholecalciferol (Vitamin D-3) 50 mcg (2,000 unit) capsule [id number] No Take 1 tablet by mouth early in the morning.. Summer Wynn MD 11/13/2023 Active   docusate sodium (Colace) 100 mg capsule [id number] No Take 1 capsule (100 mg) by mouth 2 times a day. Historical Provider, MD Past Week Active   DULoxetine (Cymbalta) 60 mg DR capsule [id number] No Take 1 capsule (60 mg) by mouth 2 times a day. Summer Wynn MD 11/13/2023 Active   enoxaparin (Lovenox) 80 mg/0.8 mL syringe [id number] No Inject 0.8 mL (80 mg) under the skin every 12 hours. Summer Wynn MD 11/12/2023 Active   ferrous sulfate 325 (65 Fe) MG EC tablet [id number] No Take 1 tablet (65 mg) by mouth once daily with a meal. Summer Wynn MD 11/12/2023 Active   ferrous sulfate 325 (65 Fe) MG tablet [id number] No Take 1 tablet (325 mg) by mouth 2 times a day. Summer Wynn MD 11/12/2023 Active   gabapentin (Neurontin) 300 mg capsule [id number] No Take 2 capsules (600 mg) by mouth every 8 hours. Summer Wynn MD 11/13/2023 Active   gabapentin (Neurontin) 300 mg capsule [id number] No Take 3 capsules (900 mg) by mouth 2 times a day. Take as directed Summer Wynn MD 11/13/2023 Active   losartan (Cozaar) 100 mg tablet [id number] No Take 1 tablet (100 mg) by mouth once daily. Summer Wynn MD 11/13/2023 Active   metFORMIN  mg 24 hr tablet [id number]  Take 1 tablet (500 mg) by mouth 2 times a day. Braulio Jain MD  Active   omeprazole (PriLOSEC) 40 mg DR capsule [id number] No Take 1  capsule (40 mg) by mouth 2 times a day. Historical Provider, MD 11/13/2023 Active   oxyCODONE (Roxicodone) 5 mg immediate release tablet [id number]  Take 1 tablet (5 mg) by mouth every 6 hours if needed for severe pain (7 - 10). Carter Spencer MD  Active   sildenafil (Viagra) 100 mg tablet [id number] No Take 1 tablet (100 mg) by mouth once daily as needed for erectile dysfunction. Bartolo Torres MD Past Week Active   tadalafil (Cialis) 20 mg tablet [id number]  Take 1 tablet (20 mg) by mouth once daily as needed for erectile dysfunction. Bartolo Torres MD  Active   topiramate (Topamax) 25 mg tablet [id number] No Take 2 tablets (50 mg) by mouth once daily at bedtime. Historical Provider, MD 11/13/2023 Active   warfarin (Coumadin) 7.5 mg tablet [id number]  Take 1 tablet (7.5 mg) by mouth once daily at bedtime. Braulio Jain MD  Active                    Allergies   Allergen Reactions    Penicillins Anaphylaxis and Unknown    Sulfa (Sulfonamide Antibiotics) Anaphylaxis and Unknown    Lisinopril Cough     cough       Social History     Socioeconomic History    Marital status:      Spouse name: Not on file    Number of children: Not on file    Years of education: Not on file    Highest education level: Not on file   Occupational History    Not on file   Tobacco Use    Smoking status: Former     Types: Cigarettes    Smokeless tobacco: Never   Vaping Use    Vaping Use: Never used   Substance and Sexual Activity    Alcohol use: Not Currently     Comment: stopped drinking 10 months ago    Drug use: Not Currently    Sexual activity: Not on file   Other Topics Concern    Not on file   Social History Narrative    Not on file     Social Determinants of Health     Financial Resource Strain: Not on file   Food Insecurity: Not on file   Transportation Needs: Not on file   Physical Activity: Not on file   Stress: Not on file   Social Connections: Not on file   Intimate Partner Violence: Not on file   Housing Stability: Not  on file       Past Surgical History:   Procedure Laterality Date    KNEE SURGERY  06/02/2015    Knee Surgery    OTHER SURGICAL HISTORY  01/26/2018    Anterior Spinal Diskectomy, Osteophytectomy Cerv Interspace         Electronically Signed      BOBBI Bernstein MD      Orthopaedic Hand Surgery      [phone]

## 2024-01-26 ENCOUNTER — ANTICOAGULATION - WARFARIN VISIT (OUTPATIENT)
Dept: PHARMACY | Facility: CLINIC | Age: 54
End: 2024-01-26
Payer: MEDICARE

## 2024-01-26 ENCOUNTER — CLINICAL SUPPORT (OUTPATIENT)
Dept: PHARMACY | Facility: CLINIC | Age: 54
End: 2024-01-26
Payer: MEDICARE

## 2024-01-26 DIAGNOSIS — I73.1 THROMBOANGIITIS OBLITERANS (BUERGER'S DISEASE) (CMS-HCC): Primary | ICD-10-CM

## 2024-01-26 LAB
POC INR: 2.8
POC PROTHROMBIN TIME: NORMAL

## 2024-01-26 PROCEDURE — 85610 PROTHROMBIN TIME: CPT | Mod: QW | Performed by: PHARMACIST

## 2024-01-26 PROCEDURE — 99212 OFFICE O/P EST SF 10 MIN: CPT | Performed by: PHARMACIST

## 2024-01-26 NOTE — PROGRESS NOTES
Coumadin Clinic Visit Note    Patient presents today for anticoagulation monitoring and adjustment.  Patient verified warfarin dosing schedule  Patient denies missing any doses  Patient denies unusual bruising or bleeding  Patient denies changes to medications, alcohol or tobacco use.  Consistent dietary green intake  There are no anticipated procedures at this time  INR Therapeutic today at 2.8  No changes to warfarin dose today  Next appointment 4 weeks      Makayla Junior, PharmD

## 2024-02-21 ENCOUNTER — OFFICE VISIT (OUTPATIENT)
Dept: PODIATRY | Facility: CLINIC | Age: 54
End: 2024-02-21
Payer: MEDICARE

## 2024-02-21 DIAGNOSIS — M20.21 HALLUX RIGIDUS OF RIGHT FOOT: ICD-10-CM

## 2024-02-21 DIAGNOSIS — B35.1 ONYCHOMYCOSIS: Primary | ICD-10-CM

## 2024-02-21 DIAGNOSIS — M20.5X2 HALLUX LIMITUS OF LEFT FOOT: ICD-10-CM

## 2024-02-21 DIAGNOSIS — I73.9 PAD (PERIPHERAL ARTERY DISEASE) (CMS-HCC): ICD-10-CM

## 2024-02-21 DIAGNOSIS — B35.1 ONYCHOMYCOSIS: ICD-10-CM

## 2024-02-21 DIAGNOSIS — M79.671 FOOT PAIN, BILATERAL: ICD-10-CM

## 2024-02-21 DIAGNOSIS — M79.671 RIGHT FOOT PAIN: ICD-10-CM

## 2024-02-21 DIAGNOSIS — M79.672 FOOT PAIN, BILATERAL: ICD-10-CM

## 2024-02-21 DIAGNOSIS — L85.3 XEROSIS OF SKIN: ICD-10-CM

## 2024-02-21 PROCEDURE — 99203 OFFICE O/P NEW LOW 30 MIN: CPT | Performed by: PODIATRIST

## 2024-02-21 PROCEDURE — 1036F TOBACCO NON-USER: CPT | Performed by: PODIATRIST

## 2024-02-21 PROCEDURE — 4010F ACE/ARB THERAPY RXD/TAKEN: CPT | Performed by: PODIATRIST

## 2024-02-21 RX ORDER — CICLOPIROX 80 MG/ML
SOLUTION TOPICAL DAILY
Qty: 6.6 ML | Refills: 11 | Status: SHIPPED | OUTPATIENT
Start: 2024-02-21 | End: 2024-02-28

## 2024-02-21 RX ORDER — TRAMADOL HYDROCHLORIDE 50 MG/1
100 TABLET ORAL 2 TIMES DAILY
COMMUNITY
Start: 2024-01-31 | End: 2024-03-01

## 2024-02-21 NOTE — PROGRESS NOTES
Chief Complaint   Patient presents with    Nail Problem     New Patient is here today with toenail fungus JERRICA hallux. Started years ago. Self -referred. Didn't try anything over the counter.      C/O painful fungal toenails.  No treatments tried.   Patient complains of itchy skin especially at night.  He uses Debrox skin on his heel to scratch the opposite limb.   L leg numb with walking. H/O lumbar issues.  Patient is a former smoker.  Patient does have Buerger's disease.  Denies any vascular studies or interventions in the lower extremities.  Patient has had vascular testing of for his arms and hands.  Patient states his digits are always cold.  H/O alcohol abuse, quit drinking 14 months ago.    PMH, PSx, Medications and allergies reviewed.  ROS negative except for what is stated in HPI.    Phyiscal Exam  Patient alert, oriented, no acute distress    VASC: +2/4 pedal pulses B/L.  CFT brisk all digits.  Skin temperature is warm to cool proximal distal bilateral foot.  No ischemic changes to the toes noted bilaterally.    NEURO: Vibratory diminished 1st MPJ B/L.  Light touch intact B/L.   5.07 Range-Vanessa monofilament intact B/L.    DERM:Nails 1,2,3,5 L and 1.,2,4,5 R are thickened, discolored, crumbly, painful and elongated with subungual debris. Pain on palpation to the nails. No cellulitis noted.   Healing excoriations on the dorsal right foot with no signs of cellulitis.  Skin xerosis noted on the heels bilaterally  There is no fissures noted bilaterally    MUSCULOSKEL: +5/5 muscle strength B/L.    No ROM 1st MPJ R, limited ROM 1st MPJ L  Large exostosis noted 1st MPJ R    Assessment and Plan  #1  Onychomycosis  Discussed pathology and treatment options  Debrided all nails without complications  Rx: Penlac apply daily  Follow-up 2-3 months    #2 Buerger's disease  Painful toes  Rx: PVR/ABIs  Follow-up 2-3 months    #3  Skin xerosis  Recommend over-the-counter CeraVe cream  Follow-up 2-3 months    #4  Hallux  rigidus R, hallux limitus L  No symptoms noted  Wear wide fitting deep supportive shoes  Follow-up as needed

## 2024-02-22 NOTE — TELEPHONE ENCOUNTER
[facility] will honor a good Rx for original prescription if you would like patient to use that please just send to [facility]; I have it qued up for you.     Jublia is too expensive      Please replace the penlac you ordered with another medication as it is not on the patient's formulary so it will not be filled.    I can call Noah once I know which medication you will be switching over to.

## 2024-02-23 ENCOUNTER — ANTICOAGULATION - WARFARIN VISIT (OUTPATIENT)
Dept: PHARMACY | Facility: CLINIC | Age: 54
End: 2024-02-23
Payer: MEDICARE

## 2024-02-23 DIAGNOSIS — I73.1 THROMBOANGIITIS OBLITERANS (BUERGER'S DISEASE) (CMS-HCC): Primary | ICD-10-CM

## 2024-02-23 LAB
POC INR: 3
POC PROTHROMBIN TIME: NORMAL

## 2024-02-23 PROCEDURE — 85610 PROTHROMBIN TIME: CPT | Mod: QW

## 2024-02-27 DIAGNOSIS — I73.1 THROMBOANGIITIS OBLITERANS (BUERGER'S DISEASE) (CMS-HCC): Primary | ICD-10-CM

## 2024-02-28 ENCOUNTER — TELEMEDICINE (OUTPATIENT)
Dept: UROLOGY | Facility: HOSPITAL | Age: 54
End: 2024-02-28
Payer: MEDICARE

## 2024-02-28 DIAGNOSIS — N52.9 MALE ERECTILE DISORDER OF ORGANIC ORIGIN: Primary | ICD-10-CM

## 2024-02-28 PROCEDURE — 4010F ACE/ARB THERAPY RXD/TAKEN: CPT | Performed by: UROLOGY

## 2024-02-28 PROCEDURE — 99213 OFFICE O/P EST LOW 20 MIN: CPT | Performed by: UROLOGY

## 2024-02-28 PROCEDURE — 1036F TOBACCO NON-USER: CPT | Performed by: UROLOGY

## 2024-02-28 RX ORDER — CICLOPIROX 80 MG/ML
SOLUTION TOPICAL DAILY
Qty: 6.6 ML | Refills: 11 | Status: SHIPPED | OUTPATIENT
Start: 2024-02-28
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
## 2024-03-04 DIAGNOSIS — I10 HYPERTENSION, UNSPECIFIED TYPE: Primary | ICD-10-CM

## 2024-03-04 RX ORDER — LOSARTAN POTASSIUM 100 MG/1
100 TABLET ORAL DAILY
Qty: 30 TABLET | Refills: 0 | Status: SHIPPED | OUTPATIENT
Start: 2024-03-04 | End: 2024-03-28 | Stop reason: SDUPTHER

## 2024-03-07 ENCOUNTER — HOSPITAL ENCOUNTER (OUTPATIENT)
Dept: VASCULAR MEDICINE | Facility: HOSPITAL | Age: 54
Discharge: HOME | End: 2024-03-07
Payer: MEDICARE

## 2024-03-07 DIAGNOSIS — I73.9 PAD (PERIPHERAL ARTERY DISEASE) (CMS-HCC): ICD-10-CM

## 2024-03-07 PROCEDURE — 93922 UPR/L XTREMITY ART 2 LEVELS: CPT | Performed by: INTERNAL MEDICINE

## 2024-03-07 PROCEDURE — 93922 UPR/L XTREMITY ART 2 LEVELS: CPT

## 2024-03-13 DIAGNOSIS — I73.1 THROMBOANGIITIS OBLITERANS (BUERGER'S DISEASE) (CMS-HCC): ICD-10-CM

## 2024-03-14 RX ORDER — WARFARIN 7.5 MG/1
7.5 TABLET ORAL NIGHTLY
Qty: 90 TABLET | Refills: 0 | Status: SHIPPED | OUTPATIENT
Start: 2024-03-14 | End: 2024-03-28 | Stop reason: SDUPTHER

## 2024-03-27 ENCOUNTER — ANTICOAGULATION - WARFARIN VISIT (OUTPATIENT)
Dept: PHARMACY | Facility: CLINIC | Age: 54
End: 2024-03-27
Payer: MEDICARE

## 2024-03-27 DIAGNOSIS — I10 HYPERTENSION, UNSPECIFIED TYPE: ICD-10-CM

## 2024-03-27 DIAGNOSIS — I73.1 THROMBOANGIITIS OBLITERANS (BUERGER'S DISEASE) (CMS-HCC): Primary | ICD-10-CM

## 2024-03-27 LAB
POC INR: 2
POC PROTHROMBIN TIME: NORMAL

## 2024-03-27 PROCEDURE — 99212 OFFICE O/P EST SF 10 MIN: CPT

## 2024-03-27 PROCEDURE — 85610 PROTHROMBIN TIME: CPT | Mod: QW

## 2024-03-27 NOTE — PROGRESS NOTES
Coumadin Clinic Visit Note    Patient verified warfarin dose  Patient endorses missing a dose Monday, but took it in the morning on Tuesday and his typically half-tablet dose in the evening  No unusual bruising or bleeding  No changes to medications  Consistent dietary green intake - endorses eating salads a few nights a week  No anticipated procedures at this time  INR Therapeutic today at 2.0  No changes to warfarin dose today  Next appointment 5 weeks      Carter Moreland, ShannanD

## 2024-03-28 ENCOUNTER — OFFICE VISIT (OUTPATIENT)
Dept: PRIMARY CARE | Facility: CLINIC | Age: 54
End: 2024-03-28
Payer: MEDICARE

## 2024-03-28 VITALS
HEIGHT: 72 IN | WEIGHT: 180 LBS | BODY MASS INDEX: 24.38 KG/M2 | DIASTOLIC BLOOD PRESSURE: 65 MMHG | SYSTOLIC BLOOD PRESSURE: 103 MMHG

## 2024-03-28 DIAGNOSIS — E78.5 HYPERLIPIDEMIA, UNSPECIFIED: ICD-10-CM

## 2024-03-28 DIAGNOSIS — I73.1 THROMBOANGIITIS OBLITERANS (BUERGER'S DISEASE) (CMS-HCC): ICD-10-CM

## 2024-03-28 DIAGNOSIS — Z12.5 SCREENING FOR PROSTATE CANCER: ICD-10-CM

## 2024-03-28 DIAGNOSIS — E11.69 TYPE 2 DIABETES MELLITUS WITH OTHER SPECIFIED COMPLICATION, WITHOUT LONG-TERM CURRENT USE OF INSULIN (MULTI): ICD-10-CM

## 2024-03-28 DIAGNOSIS — E11.9 TYPE 2 DIABETES MELLITUS WITHOUT COMPLICATION, WITHOUT LONG-TERM CURRENT USE OF INSULIN (MULTI): Primary | ICD-10-CM

## 2024-03-28 DIAGNOSIS — I10 HYPERTENSION, UNSPECIFIED TYPE: ICD-10-CM

## 2024-03-28 DIAGNOSIS — Z00.00 HEALTH CARE MAINTENANCE: ICD-10-CM

## 2024-03-28 DIAGNOSIS — Z13.220 LIPID SCREENING: ICD-10-CM

## 2024-03-28 DIAGNOSIS — I10 ESSENTIAL (PRIMARY) HYPERTENSION: ICD-10-CM

## 2024-03-28 PROCEDURE — 4010F ACE/ARB THERAPY RXD/TAKEN: CPT | Performed by: STUDENT IN AN ORGANIZED HEALTH CARE EDUCATION/TRAINING PROGRAM

## 2024-03-28 PROCEDURE — G2211 COMPLEX E/M VISIT ADD ON: HCPCS | Performed by: STUDENT IN AN ORGANIZED HEALTH CARE EDUCATION/TRAINING PROGRAM

## 2024-03-28 PROCEDURE — 3074F SYST BP LT 130 MM HG: CPT | Performed by: STUDENT IN AN ORGANIZED HEALTH CARE EDUCATION/TRAINING PROGRAM

## 2024-03-28 PROCEDURE — 99214 OFFICE O/P EST MOD 30 MIN: CPT | Performed by: STUDENT IN AN ORGANIZED HEALTH CARE EDUCATION/TRAINING PROGRAM

## 2024-03-28 PROCEDURE — 3078F DIAST BP <80 MM HG: CPT | Performed by: STUDENT IN AN ORGANIZED HEALTH CARE EDUCATION/TRAINING PROGRAM

## 2024-03-28 RX ORDER — ATORVASTATIN CALCIUM 40 MG/1
40 TABLET, FILM COATED ORAL DAILY
Qty: 90 TABLET | Refills: 1 | Status: SHIPPED | OUTPATIENT
Start: 2024-03-28

## 2024-03-28 RX ORDER — LOSARTAN POTASSIUM 100 MG/1
100 TABLET ORAL DAILY
Qty: 90 TABLET | Refills: 1 | Status: SHIPPED | OUTPATIENT
Start: 2024-03-28 | End: 2024-09-24

## 2024-03-28 RX ORDER — AMLODIPINE BESYLATE 10 MG/1
10 TABLET ORAL DAILY
Qty: 90 TABLET | Refills: 1 | Status: SHIPPED | OUTPATIENT
Start: 2024-03-28

## 2024-03-28 RX ORDER — LOSARTAN POTASSIUM 100 MG/1
100 TABLET ORAL DAILY
Qty: 90 TABLET | Refills: 1 | Status: SHIPPED | OUTPATIENT
Start: 2024-03-28

## 2024-03-28 RX ORDER — WARFARIN 7.5 MG/1
7.5 TABLET ORAL NIGHTLY
Qty: 90 TABLET | Refills: 1 | Status: SHIPPED | OUTPATIENT
Start: 2024-03-28 | End: 2024-05-31

## 2024-03-28 NOTE — PROGRESS NOTES
Subjective   Patient ID: Noah Allen is a 54 y.o. male who presents for Follow-up (Med refill).    HPI   Here for usual follow up and med refill. Denies any acute complaints or concerns.    Review of Systems  12 piont ROS negative except as stated above    Objective   Ht 1.829 m (6')   Wt 81.6 kg (180 lb)   BMI 24.41 kg/m²     Physical Exam  General: awake, alert, conversant, NAD  Skin: no suspect lesions or rashes noted on visible skin, warm and well perfused  HENT: NCAT, TMI wo significant earwax  Eyes: EOMI, no scleral icterus or conjunctival pallor  Cardiac: RRR, normal S1, S2, no M/R/G  Pulm: CTAB, normal respiratory effort, no inc WOB  Abdomen: soft, ND, NT, no involuntary guarding or rebound tenderness  EXT: no peripheral edema, no asymmetry noted  MSK: no focal joint swelling noted, sensation and strength intact 5/5 in all extremities b/l  Neuro: AOx3, able to follow commands, no gross FND  Psych: coherent thought process, appropriate mood and affect    Assessment/Plan   #microscopic hematuria  -Follows with Nephrology and urology; done w/ Urology; can follow up again if any problems     #T2DM  :: A1c improved to 5.7% after alcohol cessation and weight loss   #previous EtOH Use DIsorder  - Following w/ [facility], completed IOP  - Has abstained from EtOH since 12/1/2022     #Depression- stable on Duloxetine, follows with psychiatry     #HTN- stable, maintained on amlodipine and losartan.     #Neck Surgeries, Neuropathy- Follows at [place], now awaiting new provider given closure of SV, maintained on baclofen and gabapentin; workman's comp; no further needs at this time  #Buerger's Disease- Maintained on coumadin, quit smoking 5 years ago, follows with INR clinic  #GERD, Salinas's esophagus- On PPI, last EGD in Nov. 2023, next due Nov. 2026  #HLD- On atorvastatin 40mg  #CKD- Follows with nephrology at [facility]   #SAAD: seeing sleep medicine, CPAP has been advised     #Former  cigarette smoker: quit ~2019. Last underwent LDCT in Nov. 2023; no suspicious nodules; next due in Nov-Dec. 2024.      #Health Maintenance  Has had colonoscopy 2020 (repeat due in 10 years), repeat EGD for Salinas's due Nov. 2026 (pt prefers to call GI provider to schedule this), has COVID vaccines  LDCT screening for lung cancer next due in Nov. 2024  Routine labs. Longitudinal care.     RTC- 3-6 month     Floyd Chavarria  [facility] PGY3    Trainee role: Resident    I saw and evaluated the patient. I personally obtained the key and critical portions of the history and physical exam or was physically present for key and critical portions performed by the trainee. I reviewed the trainee's documentation and discussed the patient with the trainee. I agree with the trainee's medical decision making as documented on the trainee's notes.    Braulio Jain M.D.

## 2024-03-29 ENCOUNTER — APPOINTMENT (OUTPATIENT)
Dept: PHARMACY | Facility: CLINIC | Age: 54
End: 2024-03-29
Payer: MEDICARE

## 2024-04-18 ENCOUNTER — APPOINTMENT (OUTPATIENT)
Dept: UROLOGY | Facility: HOSPITAL | Age: 54
End: 2024-04-18
Payer: MEDICARE

## 2024-04-23 PROBLEM — S22.39XA FRACTURE OF RIB: Status: ACTIVE | Noted: 2024-04-23

## 2024-04-23 PROBLEM — M79.672 PAIN IN BOTH FEET: Status: ACTIVE | Noted: 2024-04-23

## 2024-04-23 PROBLEM — S63.509A SPRAIN OF WRIST: Status: ACTIVE | Noted: 2024-04-23

## 2024-04-23 PROBLEM — L85.3 ASTEATOSIS CUTIS: Status: ACTIVE | Noted: 2024-04-23

## 2024-04-23 PROBLEM — B35.1 ONYCHOMYCOSIS: Status: ACTIVE | Noted: 2024-04-23

## 2024-04-23 PROBLEM — N47.1 PHIMOSIS OF PENIS: Status: ACTIVE | Noted: 2024-04-23

## 2024-04-23 PROBLEM — R69 DISEASE SUSPECTED: Status: ACTIVE | Noted: 2024-04-23
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
## 2024-04-23 PROBLEM — G89.18 ACUTE POSTOPERATIVE PAIN: Status: ACTIVE | Noted: 2024-04-23

## 2024-04-23 PROBLEM — R07.9 CHEST PAIN: Status: ACTIVE | Noted: 2024-04-23

## 2024-04-23 PROBLEM — E87.1 HYPONATREMIA: Status: ACTIVE | Noted: 2024-04-23

## 2024-04-23 PROBLEM — H61.20 IMPACTED CERUMEN: Status: ACTIVE | Noted: 2024-04-23

## 2024-04-23 PROBLEM — D68.9 COAGULATION DISORDER (MULTI): Status: ACTIVE | Noted: 2024-04-23

## 2024-04-23 PROBLEM — J01.90 ACUTE SINUSITIS: Status: ACTIVE | Noted: 2024-04-23

## 2024-04-23 PROBLEM — M79.643 PAIN OF HAND: Status: ACTIVE | Noted: 2024-04-23

## 2024-04-23 PROBLEM — M79.671 PAIN IN BOTH FEET: Status: ACTIVE | Noted: 2024-04-23

## 2024-04-23 PROBLEM — F17.200 NICOTINE DEPENDENCE: Status: ACTIVE | Noted: 2024-04-23

## 2024-04-23 PROBLEM — M25.541 ARTHRALGIA OF RIGHT HAND: Status: ACTIVE | Noted: 2024-04-23

## 2024-04-23 PROBLEM — F10.939 ALCOHOL WITHDRAWAL SYNDROME (MULTI): Status: ACTIVE | Noted: 2023-10-12

## 2024-04-23 RX ORDER — TRAMADOL HYDROCHLORIDE 50 MG/1
100 TABLET ORAL 2 TIMES DAILY
COMMUNITY
Start: 2024-04-05 | End: 2024-05-05

## 2024-04-24 ENCOUNTER — OFFICE VISIT (OUTPATIENT)
Dept: PODIATRY | Facility: CLINIC | Age: 54
End: 2024-04-24
Payer: MEDICARE

## 2024-04-24 DIAGNOSIS — I73.1 THROMBOANGIITIS OBLITERANS (BUERGER'S DISEASE) (CMS-HCC): ICD-10-CM

## 2024-04-24 DIAGNOSIS — B35.1 ONYCHOMYCOSIS: Primary | ICD-10-CM

## 2024-04-24 DIAGNOSIS — M79.671 FOOT PAIN, BILATERAL: ICD-10-CM

## 2024-04-24 DIAGNOSIS — M79.672 FOOT PAIN, BILATERAL: ICD-10-CM

## 2024-04-24 PROCEDURE — 4010F ACE/ARB THERAPY RXD/TAKEN: CPT | Performed by: PODIATRIST

## 2024-04-24 PROCEDURE — 99214 OFFICE O/P EST MOD 30 MIN: CPT | Performed by: PODIATRIST

## 2024-04-24 PROCEDURE — 1036F TOBACCO NON-USER: CPT | Performed by: PODIATRIST

## 2024-04-24 NOTE — PROGRESS NOTES
Chief Complaint   Patient presents with    Follow-up     Patient is here today for fungal nail follow up. Penlac was not covered by insurance      Patient is here for follow-up topical Penlac for fungal nail treatments.  Patient has multiple questions regarding oral medication for treatment of nail fungus.  Patient is also here to review PVRs.  H/O Buerger's disease.    Phyiscal Exam  Patient alert, oriented, no acute distress    VASC: +2/4 pedal pulses B/L.  CFT brisk all digits.  Skin temperature is warm to cool proximal distal bilateral foot.  No ischemic changes to the toes noted bilaterally.    NEURO: Vibratory diminished 1st MPJ B/L.  Light touch intact B/L.   5.07 Mildred-Vanessa monofilament intact B/L.    DERM:Nails 1,2,3,5 L and 1.,2,4,5 R are thickened, discolored, crumbly, painful and elongated with subungual debris. Pain on palpation to the nails. No cellulitis noted.   The base of the right hallux nail does appear to be thinner, I cannot visualize a clearer color of the nail plate at this time.    MUSCULOSKEL: +5/5 muscle strength B/L.    No ROM 1st MPJ R, limited ROM 1st MPJ L  Large exostosis noted 1st MPJ R    PVR/DAMON:  CONCLUSIONS:  Right Lower PVR: No evidence of arterial occlusive disease in the right lower extremity at rest. Normal digital perfusion noted. Triphasic flow is noted in the right common femoral artery, right posterior tibial artery and right dorsalis pedis artery.  Left Lower PVR: No evidence of arterial occlusive disease in the left lower extremity at rest. Normal digital perfusion noted. Triphasic flow is noted in the left common femoral artery, left posterior tibial artery and left dorsalis pedis artery.     Imaging & Doppler Findings:     RIGHT Lower PVR                Pressures Ratios  Right Posterior Tibial (Ankle) 161 mmHg  1.35  Right Dorsalis Pedis (Ankle)   150 mmHg  1.26  Right Digit (Great Toe)        103 mmHg  0.87           LEFT Lower PVR                Pressures  Ratios  Left Posterior Tibial (Ankle) 157 mmHg  1.32  Left Dorsalis Pedis (Ankle)   146 mmHg  1.23  Left Digit (Great Toe)        141 mmHg  1.18                           Right     Left  Brachial Pressure 119 mmHg 119 mmHg           [id number] Alonso Mir MD, RPVI  Electronically signed by [id number] Alonso Mir MD, RPVI on 3/7/2024 at 2:27:54 PM       Assessment and Plan  #1  Onychomycosis  Debrided all nails without complications  Continue with Penlac apply daily  Multiple questions regarding oral Lamisil discussed in detail.  Not sure patient is a candidate for it, no recent blood work and they regarding his LFTs.  LFTs were elevated in 2022.  Patient states he is following up with his hepatologist in May.  Oral Lamisil and blood work orders per hepatology recommendations.  Follow-up 2-3 months    #2 Buerger's disease  Painful toes  Reviewed PVR/ABIs in detail  Follow-up 2-3 months

## 2024-05-03 ENCOUNTER — APPOINTMENT (OUTPATIENT)
Dept: PHARMACY | Facility: CLINIC | Age: 54
End: 2024-05-03
Payer: MEDICARE

## 2024-05-03 ENCOUNTER — ANTICOAGULATION - WARFARIN VISIT (OUTPATIENT)
Dept: PHARMACY | Facility: CLINIC | Age: 54
End: 2024-05-03
Payer: MEDICARE

## 2024-05-03 DIAGNOSIS — I73.1 THROMBOANGIITIS OBLITERANS (BUERGER'S DISEASE) (CMS-HCC): Primary | ICD-10-CM

## 2024-05-03 LAB
POC INR: 3.6
POC PROTHROMBIN TIME: NORMAL

## 2024-05-03 PROCEDURE — 99212 OFFICE O/P EST SF 10 MIN: CPT | Performed by: PHARMACIST

## 2024-05-03 PROCEDURE — 85610 PROTHROMBIN TIME: CPT | Mod: QW | Performed by: PHARMACIST

## 2024-05-03 NOTE — PROGRESS NOTES
Verified current dose with pt.    No new meds or med changes since last visit.  Pt denies unusual bleed/bruise.  No upcoming procedures.  Inr = 3.6  No pain meds/apap/ibu  Pt has a salad about 2 times per week.    No illness/n/v/d  No etoh, no nicotine  No otc, melatonin, heartburn meds, no mvi  Pt began taking biotin, but this has been for a while now.  Appetite is same as usual (pt does not eat every day)  Hold dose today (Fri)  Continue same weekly dose and check again in 4 weeks.

## 2024-05-06 ENCOUNTER — OFFICE VISIT (OUTPATIENT)
Dept: UROLOGY | Facility: HOSPITAL | Age: 54
End: 2024-05-06
Payer: MEDICARE

## 2024-05-06 DIAGNOSIS — N52.8 OTHER MALE ERECTILE DYSFUNCTION: Primary | ICD-10-CM

## 2024-05-06 PROCEDURE — 4010F ACE/ARB THERAPY RXD/TAKEN: CPT | Performed by: UROLOGY

## 2024-05-06 PROCEDURE — 99213 OFFICE O/P EST LOW 20 MIN: CPT | Performed by: UROLOGY

## 2024-05-06 NOTE — PROGRESS NOTES
NPV referred by Dr. Torres to discuss IPP     HISTORY OF PRESENT ILLNESS:   Noah Allen is a 54 y.o. male who is being seen today for consultation.      ED - been going on for over 7 yrs. Patient has failed PDE5i. GRETA 1.  No pain or curvature to penis.      Has had multiple neck surgeries. On coumadin     Former smoker    PAST MEDICAL HISTORY:  Past Medical History:   Diagnosis Date    Anemia, unspecified 06/29/2020    Mild anemia    Contusion of left front wall of thorax, initial encounter 10/23/2021    Contusion of left chest wall, initial encounter    Disease of spinal cord, unspecified ([facility]) 04/02/2018    Cervical myelopathy    Encounter for immunization 10/13/2020    Need for tetanus, diphtheria, and acellular pertussis (Tdap) vaccine    Encounter for therapeutic drug level monitoring 04/15/2021    Encounter for monitoring Coumadin therapy    Essential (primary) hypertension 07/29/2019    HTN (hypertension), benign    Essential (primary) hypertension 11/11/2019    HTN (hypertension), benign    Essential (primary) hypertension 09/25/2018    HTN (hypertension), benign    Essential (primary) hypertension 01/28/2019    HTN (hypertension), benign    Long term (current) use of anticoagulants 04/04/2018    Chronic anticoagulation    Pain in right wrist 05/21/2021    Right wrist pain    Personal history of nicotine dependence 04/04/2018    History of tobacco abuse    Personal history of nicotine dependence 04/08/2019    History of nicotine dependence    Personal history of other (healed) physical injury and trauma 05/21/2021    History of sprain of wrist    Personal history of other drug therapy 09/25/2018    History of influenza vaccination    Personal history of other drug therapy 11/11/2019    History of influenza vaccination    Personal history of other endocrine, nutritional and metabolic disease 02/13/2019    History of hyperlipidemia     PAST SURGICAL HISTORY:  Past Surgical History:   Procedure  Laterality Date    KNEE SURGERY  06/02/2015    Knee Surgery    OTHER SURGICAL HISTORY  01/26/2018    Anterior Spinal Diskectomy, Osteophytectomy Cerv Interspace     ALLERGIES:   Allergies   Allergen Reactions    Penicillins Anaphylaxis and Unknown    Sulfa (Sulfonamide Antibiotics) Anaphylaxis and Unknown    Lisinopril Cough     cough     MEDICATIONS:   Current Outpatient Medications   Medication Instructions    acetaminophen (TYLENOL) 500 mg, oral, Every 6 hours PRN    amLODIPine (NORVASC) 10 mg, oral, Daily    aspirin 81 mg, oral, Daily    atorvastatin (LIPITOR) 40 mg, oral, Daily    baclofen (Lioresal) 10 mg tablet 1 tablet, oral, 2 times daily PRN    cholecalciferol (Vitamin D-3) 50 mcg (2,000 unit) capsule 1 tablet, oral, Daily    ciclopirox (Penlac) 8 % solution Topical, Daily, Remove weekly    docusate sodium (COLACE) 100 mg, oral, 2 times daily    DULoxetine (CYMBALTA) 60 mg, oral, 2 times daily    ferrous sulfate 65 mg, oral, Daily with breakfast    gabapentin (Neurontin) 300 mg capsule 3 capsules, oral, 2 times daily, Take as directed    gabapentin (NEURONTIN) 600 mg, oral, Every 8 hours    losartan (COZAAR) 100 mg, oral, Daily    losartan (COZAAR) 100 mg, oral, Daily    metFORMIN XR (GLUCOPHAGE-XR) 500 mg, oral, 2 times daily    omeprazole (PRILOSEC) 40 mg, oral, 2 times daily    oxyCODONE (ROXICODONE) 5 mg, oral, Every 6 hours PRN    sildenafil (VIAGRA) 100 mg, oral, Daily PRN    tadalafil (CIALIS) 20 mg, oral, Daily PRN    topiramate (Topamax) 25 mg tablet 2 tablets, oral, Nightly    warfarin (COUMADIN) 7.5 mg, oral, Nightly      PHYSICAL EXAM:  There were no vitals taken for this visit.  Constitutional: Patient appears well-developed and well-nourished. No distress.    Pulmonary/Chest: Effort normal. No respiratory distress.   Abdominal: Soft, ND NT  : circumcised phallus, no lesions or plaques  Musculoskeletal: Normal range of motion.    Neurological: Alert and oriented to person, place, and  time.  Psychiatric: Normal mood and affect. Behavior is normal. Thought content normal.      Labs  Lab Results   Component Value Date    PSA 0.64 11/13/2019     Lab Results   Component Value Date    GFRMALE >90 09/28/2023     Lab Results   Component Value Date    CREATININE 0.89 11/21/2023     Lab Results   Component Value Date    CHOL 174 04/18/2023     Lab Results   Component Value Date    HDL 41.3 04/18/2023     Lab Results   Component Value Date    CHHDL 4.2 04/18/2023     Lab Results   Component Value Date    LDLF 99 04/18/2023     Lab Results   Component Value Date    VLDL 34 04/18/2023     Lab Results   Component Value Date    TRIG 169 (H) 04/18/2023     Lab Results   Component Value Date    HGBA1C 5.7 (H) 11/21/2023     Lab Results   Component Value Date    HCT 43.8 09/28/2023       Assessment:      1. Other male erectile dysfunction          Noah Allen is a 54 y.o. male here for FUV     Plan:   Erectile Dysfunction  Patient was seen today with the complaint of erectile dysfunction (ED).  I went over the definition of ED, which is the inability to obtain or maintain an erection sufficient for satisfactory sexual activity.  Patient filled out a GRETA questionnaire today and his total was 1.  I outlined that erectile function is a complex interplay of neural, vascular, hormonal and psychological factors.  Disruption in any of these pathways may lead to ED.    We discussed the role of Penile Doppler.  It involves an injection of a vasodilator (Trimix) to induce an erection, then using an ultrasound probe we assess the blood flow to the penis, how well the penis traps the blood (venous leak), as well as the presence of plaques, calcifications, or curvature of the penis. He is to consider this option.    In terms of treatment options; PDE5i (Viagra, Cialis, etc.), intracavernosal injections (ICI), vacuum erection devices (AYDEN) and penile implants were discussed in detail.      He would like to proceed  with penile doppler    Info about IPP given

## 2024-05-10 ENCOUNTER — APPOINTMENT (OUTPATIENT)
Dept: PHARMACY | Facility: CLINIC | Age: 54
End: 2024-05-10
Payer: MEDICARE

## 2024-05-17 ENCOUNTER — PROCEDURE VISIT (OUTPATIENT)
Dept: UROLOGY | Facility: HOSPITAL | Age: 54
End: 2024-05-17
Payer: MEDICARE

## 2024-05-17 DIAGNOSIS — N52.8 OTHER MALE ERECTILE DYSFUNCTION: Primary | ICD-10-CM

## 2024-05-17 DIAGNOSIS — R79.1 ABNORMAL COAGULATION PROFILE: ICD-10-CM

## 2024-05-17 DIAGNOSIS — R82.6 ABNORMAL URINE LEVELS OF SUBSTANCES CHIEFLY NONMEDICINAL AS TO SOURCE: ICD-10-CM

## 2024-05-17 PROCEDURE — 93980 PENILE VASCULAR STUDY: CPT | Performed by: UROLOGY

## 2024-05-17 PROCEDURE — 99214 OFFICE O/P EST MOD 30 MIN: CPT | Performed by: UROLOGY

## 2024-05-17 PROCEDURE — 54235 NJX CORPORA CAVERNOSA RX AGT: CPT | Performed by: UROLOGY

## 2024-05-17 RX ORDER — VANCOMYCIN HYDROCHLORIDE 1 G/200ML
1000 INJECTION, SOLUTION INTRAVENOUS ONCE
OUTPATIENT
Start: 2024-05-17 | End: 2024-05-17

## 2024-05-17 RX ORDER — SODIUM CHLORIDE, SODIUM LACTATE, POTASSIUM CHLORIDE, CALCIUM CHLORIDE 600; 310; 30; 20 MG/100ML; MG/100ML; MG/100ML; MG/100ML
100 INJECTION, SOLUTION INTRAVENOUS CONTINUOUS
OUTPATIENT
Start: 2024-05-17

## 2024-05-17 RX ORDER — ACETAMINOPHEN 325 MG/1
650 TABLET ORAL ONCE
OUTPATIENT
Start: 2024-05-17 | End: 2024-05-17

## 2024-05-17 RX ORDER — CHLORHEXIDINE GLUCONATE 40 MG/ML
SOLUTION TOPICAL DAILY PRN
OUTPATIENT
Start: 2024-05-17

## 2024-05-17 RX ORDER — GABAPENTIN 600 MG/1
600 TABLET ORAL ONCE
OUTPATIENT
Start: 2024-05-17 | End: 2024-05-17

## 2024-05-17 RX ORDER — FLUCONAZOLE 2 MG/ML
200 INJECTION, SOLUTION INTRAVENOUS ONCE
OUTPATIENT
Start: 2024-05-17 | End: 2024-05-17

## 2024-05-17 ASSESSMENT — PAIN SCALES - GENERAL: PAINLEVEL: 0-NO PAIN

## 2024-05-17 NOTE — PROGRESS NOTES
NPV referred by Dr. Torres to discuss IPP     HISTORY OF PRESENT ILLNESS:   Noah Allen is a 54 y.o. male who is being seen today for doppler     ED - been going on for over 7 yrs. Patient has failed PDE5i. GRETA 1.  No pain or curvature to penis.      Has had multiple neck surgeries. On coumadin     Former smoker    PAST MEDICAL HISTORY:  Past Medical History:   Diagnosis Date    Anemia, unspecified 06/29/2020    Mild anemia    Contusion of left front wall of thorax, initial encounter 10/23/2021    Contusion of left chest wall, initial encounter    Disease of spinal cord, unspecified (Multi) 04/02/2018    Cervical myelopathy    Encounter for immunization 10/13/2020    Need for tetanus, diphtheria, and acellular pertussis (Tdap) vaccine    Encounter for therapeutic drug level monitoring 04/15/2021    Encounter for monitoring Coumadin therapy    Essential (primary) hypertension 07/29/2019    HTN (hypertension), benign    Essential (primary) hypertension 11/11/2019    HTN (hypertension), benign    Essential (primary) hypertension 09/25/2018    HTN (hypertension), benign    Essential (primary) hypertension 01/28/2019    HTN (hypertension), benign    Long term (current) use of anticoagulants 04/04/2018    Chronic anticoagulation    Pain in right wrist 05/21/2021    Right wrist pain    Personal history of nicotine dependence 04/04/2018    History of tobacco abuse    Personal history of nicotine dependence 04/08/2019    History of nicotine dependence    Personal history of other (healed) physical injury and trauma 05/21/2021    History of sprain of wrist    Personal history of other drug therapy 09/25/2018    History of influenza vaccination    Personal history of other drug therapy 11/11/2019    History of influenza vaccination    Personal history of other endocrine, nutritional and metabolic disease 02/13/2019    History of hyperlipidemia     PAST SURGICAL HISTORY:  Past Surgical History:   Procedure Laterality  Date    KNEE SURGERY  06/02/2015    Knee Surgery    OTHER SURGICAL HISTORY  01/26/2018    Anterior Spinal Diskectomy, Osteophytectomy Cerv Interspace     ALLERGIES:   Allergies   Allergen Reactions    Penicillins Anaphylaxis and Unknown    Sulfa (Sulfonamide Antibiotics) Anaphylaxis and Unknown    Lisinopril Cough     cough     MEDICATIONS:   Current Outpatient Medications   Medication Instructions    acetaminophen (TYLENOL) 500 mg, oral, Every 6 hours PRN    amLODIPine (NORVASC) 10 mg, oral, Daily    aspirin 81 mg, oral, Daily    atorvastatin (LIPITOR) 40 mg, oral, Daily    baclofen (Lioresal) 10 mg tablet 1 tablet, oral, 2 times daily PRN    cholecalciferol (Vitamin D-3) 50 mcg (2,000 unit) capsule 1 tablet, oral, Daily    ciclopirox (Penlac) 8 % solution Topical, Daily, Remove weekly    docusate sodium (COLACE) 100 mg, oral, 2 times daily    DULoxetine (CYMBALTA) 60 mg, oral, 2 times daily    ferrous sulfate 65 mg, oral, Daily with breakfast    gabapentin (Neurontin) 300 mg capsule 3 capsules, oral, 2 times daily, Take as directed    gabapentin (NEURONTIN) 600 mg, oral, Every 8 hours    losartan (COZAAR) 100 mg, oral, Daily    losartan (COZAAR) 100 mg, oral, Daily    metFORMIN XR (GLUCOPHAGE-XR) 500 mg, oral, 2 times daily    omeprazole (PRILOSEC) 40 mg, oral, 2 times daily    oxyCODONE (ROXICODONE) 5 mg, oral, Every 6 hours PRN    sildenafil (VIAGRA) 100 mg, oral, Daily PRN    tadalafil (CIALIS) 20 mg, oral, Daily PRN    topiramate (Topamax) 25 mg tablet 2 tablets, oral, Nightly    warfarin (COUMADIN) 7.5 mg, oral, Nightly      PHYSICAL EXAM:  There were no vitals taken for this visit.  Constitutional: Patient appears well-developed and well-nourished. No distress.    Pulmonary/Chest: Effort normal. No respiratory distress.   Abdominal: Soft, ND NT  : circumcised phallus, no lesions or plaques  Musculoskeletal: Normal range of motion.    Neurological: Alert and oriented to person, place, and  time.  Psychiatric: Normal mood and affect. Behavior is normal. Thought content normal.      Labs  Lab Results   Component Value Date    PSA 0.64 11/13/2019     Lab Results   Component Value Date    GFRMALE >90 09/28/2023     Lab Results   Component Value Date    CREATININE 0.89 11/21/2023     Lab Results   Component Value Date    CHOL 174 04/18/2023     Lab Results   Component Value Date    HDL 41.3 04/18/2023     Lab Results   Component Value Date    CHHDL 4.2 04/18/2023     Lab Results   Component Value Date    LDLF 99 04/18/2023     Lab Results   Component Value Date    VLDL 34 04/18/2023     Lab Results   Component Value Date    TRIG 169 (H) 04/18/2023     Lab Results   Component Value Date    HGBA1C 5.7 (H) 11/21/2023     Lab Results   Component Value Date    HCT 43.8 09/28/2023     Penile doppler study    ICI injection of 10U Trimix (30/1/10)    Anatomy scan:  No plaques or calcifications. Nopenile curvature.    Post Injection flow at 10min    Right Side: Peak Systolic:  18.44 cm/s   End Diastolic:  7.36 cm/s RI: 0.6  Left SIde: Peak Systolic:  20.45 cm/s   End Diastolic 8.77 cm/s RI: 0.57    Impression: Combined arterial insufficiency and veno-occlusive disease    Pt tolerated procedure well, pt instructed to proceed to ER if he develops a persistent erection lasting longer than 4 hours.  Pt demonstrated understanding of importance of a priapism.   Assessment:      1. Other male erectile dysfunction          Noah Allen is a 54 y.o. male here for FUV     Plan:   Erectile Dysfunction  Patient was seen today with the complaint of erectile dysfunction (ED).  I went over the definition of ED, which is the inability to obtain or maintain an erection sufficient for satisfactory sexual activity.  Patient filled out a GRETA questionnaire today and his total was 1.  I outlined that erectile function is a complex interplay of neural, vascular, hormonal and psychological factors.  Disruption in any of these  pathways may lead to ED.    We discussed the role of Penile Doppler.  It involves an injection of a vasodilator (Trimix) to induce an erection, then using an ultrasound probe we assess the blood flow to the penis, how well the penis traps the blood (venous leak), as well as the presence of plaques, calcifications, or curvature of the penis. He is to consider this option.    In terms of treatment options; PDE5i (Viagra, Cialis, etc.), intracavernosal injections (ICI), vacuum erection devices (AYDEN) and penile implants were discussed in detail.      Penile Prosthesis  The patient has erectile dysfunction refractory to conservative management. Etiology of his erectile dysfunction is organic.  Patient has failed pde5i and ICI.  Further workup included penile doppler which showed Combined arterial insufficiency and veno-occlusive disease.      We discussed that further treatment would require penile prosthesis surgery.  We discussed that this artificial device would be placed into the penis and disrupt the tissue that creates natural erections so he can never go back to another kind of treatment for erections.  His penis may look and feel different, even with a completely deflated prosthesis.  We discussed that this procedure will not make his penis longer, and in fact he may look smaller then his previous natural erections.  We may have the patient use the vacuum erectile device to increase blood flow to his penis, if he is amenable.      We discussed the differences between the malleable (semirigid) and inflatable (hydraulic) types of penile prosthesis.  We discussed that the inflatable prosthesis would also include a pump in the scrotum and a reservoir that would be placed in the pelvis or abdominal wall.  We discussed that sometimes placement of the reservoir requires a second incision.  Models were used to show the prosthesis and patient was able to work with them.    Risks of the surgery were discussed, which  include but are not limited to pain, bleeding, erosion of the prosthesis, mechanical failure requiring surgical replacement, urinary retention, infection of the prosthesis requiring surgical removal and/or replacement, and damage to surrounding structures including the penis, urethra, bladder, and pelvic structures.      After all of the patient's questions were satisfactorily answered, he expressed understanding of the risks of surgery and wishes to proceed.  No barriers to learning were identified.    Patient ill be scheduled in a timely fashion.  He will see pre-admission testing and obtain any clearance if necessary.     Plan for coloplast Titan IP, pt considering vasectomy as well

## 2024-05-29 ENCOUNTER — OFFICE VISIT (OUTPATIENT)
Dept: NEUROSURGERY | Facility: CLINIC | Age: 54
End: 2024-05-29
Payer: MEDICARE

## 2024-05-29 VITALS
TEMPERATURE: 97.3 F | HEIGHT: 72 IN | DIASTOLIC BLOOD PRESSURE: 62 MMHG | BODY MASS INDEX: 24.38 KG/M2 | SYSTOLIC BLOOD PRESSURE: 110 MMHG | HEART RATE: 70 BPM | WEIGHT: 180 LBS

## 2024-05-29 DIAGNOSIS — M54.42 LEFT-SIDED LOW BACK PAIN WITH LEFT-SIDED SCIATICA, UNSPECIFIED CHRONICITY: Primary | ICD-10-CM

## 2024-05-29 DIAGNOSIS — M54.50 LOW BACK PAIN, UNSPECIFIED BACK PAIN LATERALITY, UNSPECIFIED CHRONICITY, UNSPECIFIED WHETHER SCIATICA PRESENT: ICD-10-CM

## 2024-05-29 PROBLEM — G89.18 ACUTE POSTOPERATIVE PAIN: Status: RESOLVED | Noted: 2024-04-23 | Resolved: 2024-05-29

## 2024-05-29 PROCEDURE — 1036F TOBACCO NON-USER: CPT | Performed by: NURSE PRACTITIONER

## 2024-05-29 PROCEDURE — 99214 OFFICE O/P EST MOD 30 MIN: CPT | Performed by: NURSE PRACTITIONER

## 2024-05-29 PROCEDURE — 4010F ACE/ARB THERAPY RXD/TAKEN: CPT | Performed by: NURSE PRACTITIONER

## 2024-05-29 PROCEDURE — 3074F SYST BP LT 130 MM HG: CPT | Performed by: NURSE PRACTITIONER

## 2024-05-29 PROCEDURE — 3078F DIAST BP <80 MM HG: CPT | Performed by: NURSE PRACTITIONER

## 2024-05-29 RX ORDER — TRAMADOL HYDROCHLORIDE 50 MG/1
100 TABLET ORAL 2 TIMES DAILY
COMMUNITY
Start: 2024-05-12 | End: 2024-06-11

## 2024-05-29 ASSESSMENT — PATIENT HEALTH QUESTIONNAIRE - PHQ9
7. TROUBLE CONCENTRATING ON THINGS, SUCH AS READING THE NEWSPAPER OR WATCHING TELEVISION: SEVERAL DAYS
9. THOUGHTS THAT YOU WOULD BE BETTER OFF DEAD, OR OF HURTING YOURSELF: NOT AT ALL
5. POOR APPETITE OR OVEREATING: SEVERAL DAYS
4. FEELING TIRED OR HAVING LITTLE ENERGY: MORE THAN HALF THE DAYS
SUM OF ALL RESPONSES TO PHQ9 QUESTIONS 1 & 2: 4
3. TROUBLE FALLING OR STAYING ASLEEP: NEARLY EVERY DAY
1. LITTLE INTEREST OR PLEASURE IN DOING THINGS: MORE THAN HALF THE DAYS
SUM OF ALL RESPONSES TO PHQ QUESTIONS 1-9: 12
8. MOVING OR SPEAKING SO SLOWLY THAT OTHER PEOPLE COULD HAVE NOTICED. OR THE OPPOSITE, BEING SO FIGETY OR RESTLESS THAT YOU HAVE BEEN MOVING AROUND A LOT MORE THAN USUAL: NOT AT ALL
10. IF YOU CHECKED OFF ANY PROBLEMS, HOW DIFFICULT HAVE THESE PROBLEMS MADE IT FOR YOU TO DO YOUR WORK, TAKE CARE OF THINGS AT HOME, OR GET ALONG WITH OTHER PEOPLE: SOMEWHAT DIFFICULT
6. FEELING BAD ABOUT YOURSELF - OR THAT YOU ARE A FAILURE OR HAVE LET YOURSELF OR YOUR FAMILY DOWN: SEVERAL DAYS
2. FEELING DOWN, DEPRESSED OR HOPELESS: MORE THAN HALF THE DAYS

## 2024-05-29 ASSESSMENT — LIFESTYLE VARIABLES
SKIP TO QUESTIONS 9-10: 0
AUDIT-C TOTAL SCORE: -1
HOW MANY STANDARD DRINKS CONTAINING ALCOHOL DO YOU HAVE ON A TYPICAL DAY: PATIENT DECLINED
HOW OFTEN DO YOU HAVE A DRINK CONTAINING ALCOHOL: PATIENT DECLINED
HOW OFTEN DO YOU HAVE SIX OR MORE DRINKS ON ONE OCCASION: PATIENT DECLINED

## 2024-05-29 ASSESSMENT — PAIN SCALES - GENERAL: PAINLEVEL: 8

## 2024-05-29 NOTE — PROGRESS NOTES
"Noah Allen is here today in follow up, for low back pain.     To review, he was last evaluated on 08/25/2023, by Dr. Jas Bernstein for history of cervical fusion (ACDF C4 - 7, PCDF C4 - 7). He reported creaking / cracking in cervical spine. On exam, he had no neuro deficits noted. Dr. Bernstein recommended no further cervical surgical intervention.    Today, he reports low back pain (points to low back into left buttocks then into left posterior leg into ankle to foot) since 2019, \"right after my (neck) surgery\" without inciting event. He states he was a  and \"that started every pain in my body\". Pain limits ALL activities. Standing, walking, and causes back pain and left leg numbness. Pain is up to a 10, washing dishes is a 10. He gets no relief from current medication.    TREATMENTS:  PT: current for cervical spine  Gabapentin - no help  Tramadol- no help      SMOKER: No  ANTICOAGULANT USE: YES: Daily baby ASA, WARFARIN for Buerger's disease    ROS x 10 is, otherwise, negative unless documented in HPI above    /62 (BP Location: Left arm, Patient Position: Sitting, BP Cuff Size: Adult)   Pulse 70   Temp 36.3 °C (97.3 °F) (Temporal)   Ht 1.829 m (6')   Wt 81.6 kg (180 lb)   BMI 24.41 kg/m²     On Exam: Appears comfortable  A&O x 4, speech clear / fluent  Respirations even / unlabored  Abdomen without distension  MOTOR: LEFT HIP FLEXOR 4 / 5, otherwise, 5 /5 in BLE  SENSORY: SILT in BLE  REFLEXES: 2+ bilateral patellae  Gait: normal, Toe gait intact, Heel gait intact    Noah Allen has left lumbar radicular symptoms. We discussed rationale for lumbar imaging as well as T Spine x-rays and PT for Home Exercise Program, with follow up in 6 - 8 weeks. He verbalizes understanding and agreement with plan.   Soumya Cates, APRN-CNP          "

## 2024-05-31 ENCOUNTER — ANTICOAGULATION - WARFARIN VISIT (OUTPATIENT)
Dept: PHARMACY | Facility: CLINIC | Age: 54
End: 2024-05-31
Payer: MEDICARE

## 2024-05-31 DIAGNOSIS — I73.1 THROMBOANGIITIS OBLITERANS (BUERGER'S DISEASE) (CMS-HCC): ICD-10-CM

## 2024-05-31 DIAGNOSIS — I73.1 THROMBOANGIITIS OBLITERANS (BUERGER'S DISEASE) (CMS-HCC): Primary | ICD-10-CM

## 2024-05-31 LAB
POC INR: 3.1
POC PROTHROMBIN TIME: NORMAL

## 2024-05-31 PROCEDURE — 85610 PROTHROMBIN TIME: CPT | Mod: QW | Performed by: PHARMACIST

## 2024-05-31 PROCEDURE — 99212 OFFICE O/P EST SF 10 MIN: CPT | Performed by: PHARMACIST

## 2024-05-31 RX ORDER — WARFARIN 7.5 MG/1
7.5 TABLET ORAL NIGHTLY
Qty: 90 TABLET | Refills: 0 | Status: SHIPPED | OUTPATIENT
Start: 2024-05-31

## 2024-05-31 NOTE — PROGRESS NOTES
Verified current dose with pt and that he held x 1 dose at last visit.    No new meds or med changes since last visit.  Pt denies unusual bleed/bruise.  No upcoming procedures - 6/19 - he says the procedure is personal/private.  I asked him to find out how many days he will hold the warfarin and to let us know ASAP.  He will need to be bridged with lovenox.    Ht = 6 ft  Wt = 180 lb (pt weighed himself 2 days ago) (82 kg)  Plt = 292 (09/28/23)  Scr = 0.89 mg/dl (11/21/23)  EST Crcl = 102 ml/min  Will need Lovenox 80 mg subcutaneous Q12H  Will need lovenox called into pharmacy and lovenox sheet written out as soon as he can tell us how many days he is holding the warfarin.    No missed doses  Inr = 3.1 - pt is eating his greens consistently as usual.    Continue same dose and check again in 2 weeks prior to starting lovenox.

## 2024-06-07 ENCOUNTER — TELEPHONE (OUTPATIENT)
Dept: PHARMACY | Facility: CLINIC | Age: 54
End: 2024-06-07
Payer: MEDICARE

## 2024-06-07 NOTE — TELEPHONE ENCOUNTER
"Enoxaparin (Lovenox) \"Bridging for Warfarin (Coumadin/Jantoven)  Enoxaparin (Lovenox) Dose and Frequency:  Lovenox 80 mg inject under the skin every 12 hours                                 Pre-Op and Post-Op Anticoagulation Instructions            Day          Date               Instructions             -7               -6               -5 Fri 6/14 Do not take Warfarin             -4 Sat 6/15   Do not take Warfarin  Inject Enoxaparin (Lovenox) in the morning and evening (12 hours apart)             -3 Sun 6/16 Do not take Warfarin  Inject Enoxaparin (Lovenox) in the morning and evening (12 hours apart)             -2 Mon 6/17 Do not take Warfarin  Inject Enoxaparin (Lovenox) in the morning and evening (12 hours apart)             -1  DAY BEFORE PROCEDURE Tues 6/18 Do not take Warfarin  Only take Enoxaparin (Lovenox) dose in the morning  Do not take evening Enoxaparin (Lovenox)        DAY OF         PROCEDURE Wed 6/19 /Do not take Enoxaparin (Lovenox) today  Take your usual dose of warfarin in the evening            +1 Thurs 6/20 Take you usual dose of warfarin  Inject Enoxaparin (Lovenox) in the morning and evening (12 hours apart)            +2 Fri 6/21 Take you usual dose of warfarin  Inject Enoxaparin (Lovenox) in the morning and evening (12 hours apart)            +3 Sat 6/22 Take you usual dose of warfarin  Inject Enoxaparin (Lovenox) in the morning and evening (12 hours apart)            +4 Sun 6/23 Schedule appointment with Anticoagulation Clinic to access further need for Enoxaparin (Lovenox)            +5         "

## 2024-06-07 NOTE — TELEPHONE ENCOUNTER
D/w Rosy Sanchez (she reached out to me) - told her the usual hold for pt's procedure IPP is 5 days.  Procedure is on 6/19, therefore pt will start his hold on 6/14, which also happens to be his appointment here with us.  He will be bridged with lovenox for this procedure per Dr. Jain (he had Rosy reach out to me).  I called pt and left a detailed VM explaining that he will stop warfarin starting on 6/14 and hold 5 days.  Also told him he needs to keep his appointment with us for that day so we can go over bridge.

## 2024-06-10 ENCOUNTER — TELEPHONE (OUTPATIENT)
Dept: PHARMACY | Facility: CLINIC | Age: 54
End: 2024-06-10
Payer: MEDICARE

## 2024-06-10 NOTE — TELEPHONE ENCOUNTER
Called [phone], left VM  Warfarin 7.5 mg tablet  Take 3.75 mg on Tues and Thurs and 7.5 mg all other days or UD  #80 (90 day) 1 rf  Dr. Braulio Jain    ALSO    Lovenox 80 mg syringe  Inject 80 mg subcutaneous Q12H  #13 syringes  No rf  Dr. Braulio Jain

## 2024-06-11 ENCOUNTER — TELEPHONE (OUTPATIENT)
Dept: PREADMISSION TESTING | Facility: HOSPITAL | Age: 54
End: 2024-06-11
Payer: MEDICARE

## 2024-06-12 ENCOUNTER — LAB (OUTPATIENT)
Dept: LAB | Facility: LAB | Age: 54
End: 2024-06-12
Payer: MEDICARE

## 2024-06-12 ENCOUNTER — PRE-ADMISSION TESTING (OUTPATIENT)
Dept: PREADMISSION TESTING | Facility: HOSPITAL | Age: 54
End: 2024-06-12
Payer: MEDICARE

## 2024-06-12 ENCOUNTER — DOCUMENTATION (OUTPATIENT)
Dept: PREADMISSION TESTING | Facility: HOSPITAL | Age: 54
End: 2024-06-12

## 2024-06-12 VITALS
WEIGHT: 179.68 LBS | BODY MASS INDEX: 25.15 KG/M2 | TEMPERATURE: 96.4 F | DIASTOLIC BLOOD PRESSURE: 68 MMHG | SYSTOLIC BLOOD PRESSURE: 110 MMHG | HEART RATE: 72 BPM | RESPIRATION RATE: 18 BRPM | OXYGEN SATURATION: 96 % | HEIGHT: 71 IN

## 2024-06-12 DIAGNOSIS — Z01.818 PRE-OP TESTING: ICD-10-CM

## 2024-06-12 DIAGNOSIS — N52.8 OTHER MALE ERECTILE DYSFUNCTION: ICD-10-CM

## 2024-06-12 DIAGNOSIS — Z79.01 ANTICOAGULATED ON COUMADIN: ICD-10-CM

## 2024-06-12 DIAGNOSIS — Z01.818 PRE-OP TESTING: Primary | ICD-10-CM

## 2024-06-12 DIAGNOSIS — R79.1 ABNORMAL COAGULATION PROFILE: ICD-10-CM

## 2024-06-12 LAB
ALBUMIN SERPL BCP-MCNC: 4.3 G/DL (ref 3.4–5)
ALP SERPL-CCNC: 125 U/L (ref 33–120)
ALT SERPL W P-5'-P-CCNC: 12 U/L (ref 10–52)
ANION GAP SERPL CALC-SCNC: 13 MMOL/L (ref 10–20)
ANION GAP SERPL CALC-SCNC: 15 MMOL/L (ref 10–20)
APPEARANCE UR: CLEAR
APTT PPP: 57 SECONDS (ref 27–38)
AST SERPL W P-5'-P-CCNC: 13 U/L (ref 9–39)
ATRIAL RATE: 69 BPM
BILIRUB SERPL-MCNC: 0.4 MG/DL (ref 0–1.2)
BILIRUB UR STRIP.AUTO-MCNC: ABNORMAL MG/DL
BUN SERPL-MCNC: 17 MG/DL (ref 6–23)
BUN SERPL-MCNC: 17 MG/DL (ref 6–23)
CALCIUM SERPL-MCNC: 9.2 MG/DL (ref 8.6–10.3)
CALCIUM SERPL-MCNC: 9.4 MG/DL (ref 8.6–10.3)
CHLORIDE SERPL-SCNC: 101 MMOL/L (ref 98–107)
CHLORIDE SERPL-SCNC: 101 MMOL/L (ref 98–107)
CO2 SERPL-SCNC: 25 MMOL/L (ref 21–32)
CO2 SERPL-SCNC: 27 MMOL/L (ref 21–32)
COLOR UR: YELLOW
CREAT SERPL-MCNC: 1.35 MG/DL (ref 0.5–1.3)
CREAT SERPL-MCNC: 1.42 MG/DL (ref 0.5–1.3)
EGFRCR SERPLBLD CKD-EPI 2021: 58 ML/MIN/1.73M*2
EGFRCR SERPLBLD CKD-EPI 2021: 62 ML/MIN/1.73M*2
ERYTHROCYTE [DISTWIDTH] IN BLOOD BY AUTOMATED COUNT: 13.4 % (ref 11.5–14.5)
GLUCOSE SERPL-MCNC: 87 MG/DL (ref 74–99)
GLUCOSE SERPL-MCNC: 89 MG/DL (ref 74–99)
GLUCOSE UR STRIP.AUTO-MCNC: NORMAL MG/DL
HCT VFR BLD AUTO: 40.5 % (ref 41–52)
HGB BLD-MCNC: 13.4 G/DL (ref 13.5–17.5)
HYALINE CASTS #/AREA URNS AUTO: ABNORMAL /LPF
INR PPP: 3.8 (ref 0.9–1.1)
KETONES UR STRIP.AUTO-MCNC: ABNORMAL MG/DL
LEUKOCYTE ESTERASE UR QL STRIP.AUTO: NEGATIVE
MCH RBC QN AUTO: 28.2 PG (ref 26–34)
MCHC RBC AUTO-ENTMCNC: 33.1 G/DL (ref 32–36)
MCV RBC AUTO: 85 FL (ref 80–100)
MUCOUS THREADS #/AREA URNS AUTO: ABNORMAL /LPF
NITRITE UR QL STRIP.AUTO: NEGATIVE
NRBC BLD-RTO: 0 /100 WBCS (ref 0–0)
P AXIS: 65 DEGREES
P OFFSET: 187 MS
P ONSET: 135 MS
PH UR STRIP.AUTO: 6 [PH]
PLATELET # BLD AUTO: 292 X10*3/UL (ref 150–450)
POTASSIUM SERPL-SCNC: 4.3 MMOL/L (ref 3.5–5.3)
POTASSIUM SERPL-SCNC: 4.3 MMOL/L (ref 3.5–5.3)
PR INTERVAL: 180 MS
PROT SERPL-MCNC: 6.9 G/DL (ref 6.4–8.2)
PROT UR STRIP.AUTO-MCNC: ABNORMAL MG/DL
PROTHROMBIN TIME: 43.2 SECONDS (ref 9.8–12.8)
Q ONSET: 225 MS
QRS COUNT: 11 BEATS
QRS DURATION: 76 MS
QT INTERVAL: 394 MS
QTC CALCULATION(BAZETT): 422 MS
QTC FREDERICIA: 413 MS
R AXIS: 86 DEGREES
RBC # BLD AUTO: 4.76 X10*6/UL (ref 4.5–5.9)
RBC # UR STRIP.AUTO: NEGATIVE /UL
RBC #/AREA URNS AUTO: ABNORMAL /HPF
SODIUM SERPL-SCNC: 137 MMOL/L (ref 136–145)
SODIUM SERPL-SCNC: 137 MMOL/L (ref 136–145)
SP GR UR STRIP.AUTO: 1.03
SQUAMOUS #/AREA URNS AUTO: ABNORMAL /HPF
T AXIS: 64 DEGREES
T OFFSET: 422 MS
UROBILINOGEN UR STRIP.AUTO-MCNC: ABNORMAL MG/DL
VENTRICULAR RATE: 69 BPM
WBC # BLD AUTO: 7.4 X10*3/UL (ref 4.4–11.3)
WBC #/AREA URNS AUTO: ABNORMAL /HPF

## 2024-06-12 PROCEDURE — 81001 URINALYSIS AUTO W/SCOPE: CPT

## 2024-06-12 PROCEDURE — 80048 BASIC METABOLIC PNL TOTAL CA: CPT

## 2024-06-12 PROCEDURE — 87081 CULTURE SCREEN ONLY: CPT | Mod: AHULAB | Performed by: NURSE PRACTITIONER

## 2024-06-12 PROCEDURE — 85027 COMPLETE CBC AUTOMATED: CPT

## 2024-06-12 PROCEDURE — 36415 COLL VENOUS BLD VENIPUNCTURE: CPT

## 2024-06-12 PROCEDURE — 85610 PROTHROMBIN TIME: CPT

## 2024-06-12 PROCEDURE — 80053 COMPREHEN METABOLIC PANEL: CPT

## 2024-06-12 PROCEDURE — 85730 THROMBOPLASTIN TIME PARTIAL: CPT

## 2024-06-12 PROCEDURE — 93005 ELECTROCARDIOGRAM TRACING: CPT | Performed by: NURSE PRACTITIONER

## 2024-06-12 RX ORDER — CHLORHEXIDINE GLUCONATE ORAL RINSE 1.2 MG/ML
SOLUTION DENTAL
Qty: 475 ML | Refills: 0 | Status: SHIPPED | OUTPATIENT
Start: 2024-06-12

## 2024-06-12 ASSESSMENT — ENCOUNTER SYMPTOMS
PALPITATIONS: 0
CONSTITUTIONAL NEGATIVE: 1
DYSPNEA WITH EXERTION: 0
NECK STIFFNESS: 1
ARTHRALGIAS: 1
DYSPNEA AT REST: 0
DIARRHEA: 0
CONSTIPATION: 0
MYALGIAS: 1
NEUROLOGICAL NEGATIVE: 1
ABDOMINAL PAIN: 0
BRUISES/BLEEDS EASILY: 1

## 2024-06-12 NOTE — CPM/PAT H&P
[facility]/Formerly [facility] Evaluation       Name: Noah Allen (Noah Allen)  /Age: 1970/54 y.o.       Date of Consult: 24     Referring Provider: Dr. Mcdonald    Surgery, Date, and Length: Penile Prosthesis Insertion, 24, 90 min.    Noah Allen is a 54 year-old male who presents to the [facility] for perioperative risk assessment prior to surgery.    Patient presents with a primary diagnosis of male erectile dysfunction.   Erectile dysfunction has been going on for seven years. Patient has failed PDE5i. GRETA 1.  No pain or curvature to penis.    Has had multiple neck surgeries. On coumadin for a history of Buergers disease.     This note was created in part upon personal review of patient's medical records.      Patient is scheduled to have a Penile Prosthesis Insertion.    Pt denies any past history of anesthetic complications such as PONV, awareness, prolonged sedation, dental damage, aspiration, cardiac arrest, difficult intubation, difficult I.V. access or unexpected hospital admissions.  NO malignant hyperthermia and or pseudocholinesterase deficiency.  No history of blood transfusions     The patient is not a Religion and will accept blood and blood products if medically indicated.   Type and screen sent.     Past Medical History:   Diagnosis Date    Alcohol abuse     quit 20    Anemia, unspecified 2020    Mild anemia, H/H= 14/43.8 on 23    Salinas's esophagus     Buerger's disease (CMS-Formerly [facility])     managed by PCP Dr. Jain: on Coumadin, stoppage requested    Cervical radiculopathy     Cervical spondylosis     Chronic pain     CKD (chronic kidney disease)     BUN/CR= 9/0.89 with eGFR >90 on 23    Contusion of left front wall of thorax, initial encounter 10/23/2021    Contusion of left chest wall, initial encounter    Depression     Disease of spinal cord, unspecified (Multi) 2018    Cervical myelopathy    DM (diabetes mellitus) (Multi)     A1C= 5.7% on  11/21/23    ED (erectile dysfunction)     Essential (primary) hypertension 09/25/2018    HTN (hypertension), benign    GERD (gastroesophageal reflux disease)     Hematuria     following with urology    HLD (hyperlipidemia)     Hyponatremia     LC=801, 11/21/23    Lightheadedness     Long term (current) use of anticoagulants 04/04/2018    Chronic anticoagulation    Lumbar radiculopathy     Numbness     SAAD (obstructive sleep apnea)     Palpitations     Phimosis of penis     PVD (peripheral vascular disease) (CMS-Formerly [facility])     Vitamin D deficiency        Past Surgical History:   Procedure Laterality Date    CERVICAL FUSION      COLONOSCOPY      KNEE SURGERY  06/02/2015    Knee Surgery    OTHER SURGICAL HISTORY  01/26/2018    Anterior Spinal Diskectomy, Osteophytectomy Cerv Interspace    UPPER GASTROINTESTINAL ENDOSCOPY         Family History   Problem Relation Name Age of Onset    Other (cerebral infarction) Mother      Other (htn) Mother      Diabetes Father      Other (htn) Father       Social History     Socioeconomic History    Marital status:      Spouse name: Not on file    Number of children: Not on file    Years of education: Not on file    Highest education level: Not on file   Occupational History    Not on file   Tobacco Use    Smoking status: Former     Types: Cigarettes    Smokeless tobacco: Never   Vaping Use    Vaping status: Never Used   Substance and Sexual Activity    Alcohol use: Not Currently     Comment: stopped drinking 10 months ago    Drug use: Not Currently    Sexual activity: Not on file   Other Topics Concern    Not on file   Social History Narrative    Not on file     Social Determinants of Health     Financial Resource Strain: Not on file   Food Insecurity: Not on file   Transportation Needs: Not on file   Physical Activity: Not on file   Stress: Not on file   Social Connections: Not on file   Intimate Partner Violence: Not on file   Housing Stability: Not on file        Allergies    Allergen Reactions    Penicillins Anaphylaxis and Unknown    Sulfa (Sulfonamide Antibiotics) Anaphylaxis and Unknown    Lisinopril Cough     cough         Current Outpatient Medications:     amLODIPine (Norvasc) 10 mg tablet, Take 1 tablet (10 mg) by mouth once daily., Disp: 90 tablet, Rfl: 1    aspirin 81 mg EC tablet, Take 1 tablet (81 mg) by mouth once daily., Disp: , Rfl:     atorvastatin (Lipitor) 40 mg tablet, Take 1 tablet (40 mg) by mouth once daily., Disp: 90 tablet, Rfl: 1    baclofen (Lioresal) 10 mg tablet, Take 1 tablet (10 mg) by mouth 2 times a day as needed., Disp: , Rfl:     cholecalciferol (Vitamin D-3) 50 mcg (2,000 unit) capsule, Take 1 tablet by mouth early in the morning.., Disp: , Rfl:     ciclopirox (Penlac) 8 % solution, APPLY TOPICALLY ONCE DAILY. REMOVE WEEKLY, Disp: 6.6 mL, Rfl: 11    docusate sodium (Colace) 100 mg capsule, Take 1 capsule (100 mg) by mouth 2 times a day., Disp: , Rfl:     DULoxetine (Cymbalta) 60 mg DR capsule, Take 1 capsule (60 mg) by mouth 2 times a day., Disp: , Rfl:     ferrous sulfate 325 (65 Fe) MG EC tablet, Take 1 tablet (65 mg) by mouth once daily with a meal., Disp: , Rfl:     gabapentin (Neurontin) 300 mg capsule, Take 3 capsules (900 mg) by mouth 2 times a day. Take as directed, Disp: , Rfl:     losartan (Cozaar) 100 mg tablet, TAKE 1 TABLET BY MOUTH EVERY DAY, Disp: 90 tablet, Rfl: 1    metFORMIN  mg 24 hr tablet, Take 1 tablet (500 mg) by mouth 2 times a day., Disp: 180 tablet, Rfl: 1    omeprazole (PriLOSEC) 40 mg DR capsule, Take 1 capsule (40 mg) by mouth 2 times a day., Disp: , Rfl:     traMADol (Ultram) 50 mg tablet, Take 2 tablets (100 mg) by mouth 2 times a day., Disp: , Rfl:     warfarin (Coumadin) 7.5 mg tablet, TAKE 1 TABLET BY MOUTH ONCE DAILY AT BEDTIME., Disp: 90 tablet, Rfl: 0    chlorhexidine (Peridex) 0.12 % solution, Swish for 30 seconds and spit 15mL of solution the night before and morning of surgery, Disp: 475 mL, Rfl: 0     "losartan (Cozaar) 100 mg tablet, Take 1 tablet (100 mg) by mouth once daily., Disp: 90 tablet, Rfl: 1  No current facility-administered medications for this visit.      Visit Vitals  /68   Pulse 72   Temp 35.8 °C (96.4 °F)   Resp 18   Ht 1.803 m (5' 11\")   Wt 81.5 kg (179 lb 10.8 oz)   SpO2 96%   BMI 25.06 kg/m²   Smoking Status Former   BSA 2.02 m²        PAT ROS:   Constitutional:   neg    Neuro/Psych:   neg    Eyes:    use of corrective lenses  Ears:    no hearing aides  Nose:   Mouth:   neg    Throat:   neg    Neck:    neck stiffness  Cardio:    Functional 4 Mets. Patient denies SOB walking up 2 flights of stairs, yardwork, physical therapy, housework   no chest pain   no palpitations   no dyspnea   no CROCKETT  Respiratory:   Endocrine:   GI:    no abdominal pain   no constipation   no diarrhea  :   neg    Musculoskeletal:    arthralgias (generalized)   myalgias (generalized)  Hematologic:    bruises/bleeds easily   no history of blood transfusion  Skin:  neg        Physical Exam  Physical exam within normal limits.          PAT AIRWAY:   Airway:     Mallampati::  II    Neck ROM::  Limited   No broken teeth, no dentures and no missing teeth          Plan    Neuro:  Monitor for signs and symptoms of substance withdrawal during the postoperative period, assess, and treat as needed with the appropriate monitoring tool.  History of alcohol abuse, quit drinking in 2022. Previous LFT's were normal.     Cardiovascular:  Patient denies any chest pain, tightness, heaviness, pressure, radiating pain, palpitations, irregular heartbeats, lightheadedness, cough, congestion, shortness of breath, CROCKETT, PND, near syncope, weight loss or gain.    HLD:  Asa 81 mg -patient to take on dos  Lipitor-patient to take on dos    HTN:  Amlodipine-patient to take on dos   Losartan-patient to stop one day prior to surgery.       EKG in PAT on 06/12/24   Encounter Date: 06/12/24   ECG 12 Lead   Result Value    Ventricular Rate 69    " Atrial Rate 69    KS Interval 180    QRS Duration 76    QT Interval 394    QTC Calculation(Bazett) 422    P Axis 65    R Axis 86    T Axis 64    QRS Count 11    Q Onset 225    P Onset 135    P Offset 187    T Offset 422    QTC Fredericia 413    Narrative    Normal sinus rhythm  Possible Anterior infarct (cited on or before 28-SEP-2023)  Abnormal ECG  When compared with ECG of 28-SEP-2023 13:29,  No significant change was found  Confirmed by Jose Alberto Gomez ([id number]) on 6/12/2024 2:41:36 PM          RCRI: 0 Risk of Mace: 3.9%    Pulm:  SAAD-Patient asked to follow up with PCP for suspected SAAD. Recommend prioritizing  nonopioid analgesic techniques (regional and local anesthesia, nonsteroidal medications, etc) before the administration of opioids and close monitoring for hypoventilation after surgery due to suspected SAAD. If intravenous narcotics are needed beyond the immediate henrietta-operative period, the patient may benefit from continuous pulse oximetry to monitor for hypoxic events till baseline Sp02 is normal on room air and  a respiratory therapy evaluation.     Patient does not wear a CPAP    Renal/endo:  Recommendations to avoid nephrotoxic drugs and carefully monitor fluid status to maintain euvolemia. Use dose adjusted medications as needed for the underlying level of renal function.    DM-  Metformin-patient to stop one day prior to surgery    GI/:  GERD:  Prilosec-patient to take dos    Heme:    Buergers disease-patient is on Coumadin    Dr. Tori Pereira (pharm D)- [facility]  Patient to hold Coumadin 5 days prior to surgery.  Detailed instructions in encounters (telephone encounter 06/07/24) on Lovenox bridging. Patient aware and has follow up with [facility] on 06/14/24.     Patient instructed to ambulate as soon as possible postoperatively to decrease thromboembolic risk.    Initiate mechanical DVT prophylaxis as soon as possible and initiate chemical prophylaxis when deemed safe from a bleeding  standpoint post surgery.    Caprini= 6    Risk assessment complete.  Patient is scheduled for an intermediate surgical risk procedure.      Labs/testing obtained in PAT on 06/12/24   CBC, BMP, Coag, Urine, MRSA, EKG  Lab Results   Component Value Date    WBC 7.4 06/12/2024    HGB 13.4 (L) 06/12/2024    HCT 40.5 (L) 06/12/2024    MCV 85 06/12/2024     06/12/2024      Lab Results   Component Value Date    GLUCOSE 89 06/12/2024    CALCIUM 9.4 06/12/2024     06/12/2024    K 4.3 06/12/2024    CO2 27 06/12/2024     06/12/2024    BUN 17 06/12/2024    CREATININE 1.42 (H) 06/12/2024      Lab Results   Component Value Date    INR 3.8 (H) 06/12/2024    INR 3.10 05/31/2024    INR 3.60 05/03/2024    PROTIME 43.2 (H) 06/12/2024    PROTIME 43.4 (H) 11/29/2022    PROTIME 19.8 (H) 10/23/2021     Lab Results   Component Value Date    INR 1.3 (H) 06/18/2024    INR 3.90 06/14/2024    INR 3.8 (H) 06/12/2024    PROTIME 14.6 (H) 06/18/2024    PROTIME 43.2 (H) 06/12/2024    PROTIME 43.4 (H) 11/29/2022          CBC, BMP, and Coags reviewed.  H/H slightly decreased. Patient has a history of anemia.  Cr slightly elevated, patient has a history of CKD.          Preoperative medication instructions were provided and reviewed with the patient.  Any additional testing or evaluation was explained to the patient.  Nothing by mouth instructions were discussed and patient's questions were answered prior to conclusion to this encounter.  Patient verbalized understanding of preoperative instructions given in preadmission testing; discharge instructions available in EMR.    This note was dictated with speech recognition.  Minor errors may have been detected during use of speech recognition.

## 2024-06-12 NOTE — PREPROCEDURE INSTRUCTIONS
Medication List            Accurate as of June 12, 2024  2:15 PM. Always use your most recent med list.                amLODIPine 10 mg tablet  Commonly known as: Norvasc  Take 1 tablet (10 mg) by mouth once daily.  Medication Adjustments for Surgery: Take morning of surgery with sip of water, no other fluids     aspirin 81 mg EC tablet  Medication Adjustments for Surgery: Take morning of surgery with sip of water, no other fluids     atorvastatin 40 mg tablet  Commonly known as: Lipitor  Take 1 tablet (40 mg) by mouth once daily.  Medication Adjustments for Surgery: Take morning of surgery with sip of water, no other fluids     baclofen 10 mg tablet  Commonly known as: Lioresal  Medication Adjustments for Surgery: Take morning of surgery with sip of water, no other fluids     chlorhexidine 0.12 % solution  Commonly known as: Peridex  Swish for 30 seconds and spit 15mL of solution the night before and morning of surgery     cholecalciferol 50 mcg (2,000 unit) capsule  Commonly known as: Vitamin D-3  Medication Adjustments for Surgery: Stop 1 day before surgery     ciclopirox 8 % solution  Commonly known as: Penlac  APPLY TOPICALLY ONCE DAILY. REMOVE WEEKLY  Medication Adjustments for Surgery: Continue until night before surgery     docusate sodium 100 mg capsule  Commonly known as: Colace  Medication Adjustments for Surgery: Stop 1 day before surgery     DULoxetine 60 mg DR capsule  Commonly known as: Cymbalta  Medication Adjustments for Surgery: Take morning of surgery with sip of water, no other fluids     ferrous sulfate 325 (65 Fe) MG EC tablet  Medication Adjustments for Surgery: Stop 1 day before surgery     gabapentin 300 mg capsule  Commonly known as: Neurontin  Medication Adjustments for Surgery: Take morning of surgery with sip of water, no other fluids     * losartan 100 mg tablet  Commonly known as: Cozaar  TAKE 1 TABLET BY MOUTH EVERY DAY  Medication Adjustments for Surgery: Stop 1 day before  surgery     * losartan 100 mg tablet  Commonly known as: Cozaar  Take 1 tablet (100 mg) by mouth once daily.  Medication Adjustments for Surgery: Stop 1 day before surgery     metFORMIN  mg 24 hr tablet  Commonly known as: Glucophage-XR  Take 1 tablet (500 mg) by mouth 2 times a day.  Medication Adjustments for Surgery: Stop 1 day before surgery     omeprazole 40 mg DR capsule  Commonly known as: PriLOSEC  Medication Adjustments for Surgery: Take morning of surgery with sip of water, no other fluids     traMADol 50 mg tablet  Commonly known as: Ultram  Medication Adjustments for Surgery: Continue until night before surgery     warfarin 7.5 mg tablet  Commonly known as: Coumadin  Take as directed by the anticoagulation clinic. If you are unsure how to take this medication, talk to your nurse or doctor.  Original instructions: TAKE 1 TABLET BY MOUTH ONCE DAILY AT BEDTIME.  Notes to patient: Stop 5 days prior to surgery.           * This list has 2 medication(s) that are the same as other medications prescribed for you. Read the directions carefully, and ask your doctor or other care provider to review them with you.                    The medication bridging plan has been discussed with the surgeon and the patient has been informed of the plan.        **Concerning above medication instructions, if medication is normally taken at night, continue normal schedule.**  **DO NOT TAKE NIGHT PRIOR AND MORNING OF SURGERY**    CONTACT SURGEON'S OFFICE IF YOU DEVELOP:  * Fever = 100.4 F   * New respiratory symptoms (e.g. cough, shortness of breath, respiratory distress, sore throat)  * Recent loss of taste or smell  *Flu like symptoms such as headache, fatigue or gastrointestinal symptoms  * You develop any open sores, shingles, burning or painful urination   AND/OR:  * You no longer wish to have the surgery.  * Any other personal circumstances change that may lead to the need to cancel or defer this surgery.  *You were  admitted to any hospital within one week of your planned procedure.    SMOKING:  *Quitting smoking can make a huge difference to your health and recovery from surgery.    *If you need help with quitting, call 3-145-QUIT-NOW.    THE DAY BEFORE SURGERY:   Nothing by mouth (no solids or liquids) after midnight.   If diabetic, please check fasting blood sugar upon waking up.    If fasting sugar is <80 mg/dl, please drink 100ml/3oz of apple juice no later than 2 hours prior to arrival time.      SURGICAL TIME  *You will be contacted between 2 p.m. and 6 p.m. the business day before your surgery with your arrival time.  *If you haven't received a call by 6pm, call [phone].  *Scheduled surgery times may change and you will be notified if this occurs-check your personal voicemail for any updates.    ON THE MORNING OF SURGERY:  *Wear comfortable, loose fitting clothing.   *Do not use moisturizers, creams, lotions or perfume.  *All jewelry and valuables should be left at home.  *Prosthetic devices such as contact lenses, hearing aids, dentures, eyelash extensions, hairpins and body piercing must be removed before surgery.    BRING WITH YOU:  *Photo ID and insurance card  *Current list of medicines and allergies  *Pacemaker/Defibrillator/Heart stent cards  *CPAP machine and mask  *Slings/splints/crutches  *Copy of your complete Advanced Directive/DHPOA-if applicable  *Neurostimulator implant remote    PARKING AND ARRIVAL:  *Check in at the Main Entrance desk and let them know you are here for surgery.  *You will be directed to the 2nd floor surgical waiting area.    AFTER OUTPATIENT SURGERY:  *A responsible adult MUST accompany you at the time of discharge and stay with you for 24 hours after your surgery.  *You may NOT drive yourself home after surgery.  *You may use a taxi or ride sharing service ([employer], Uber) to return home ONLY if you are accompanied by a friend or family member.  *Instructions for resuming your  medications will be provided by your surgeon.         Patient Information: Oral/Dental Rinse  **This is a prescription; pick it up at your preferred local pharmacy **  What is oral/dental rinse?   It is a mouthwash. It is a way of cleaning the mouth with a germ killing solution before your surgery.  The solution contains chlorhexidine, commonly known as CHG.   It is used inside the mouth to kill a bacteria known as Staphylococcus aureus.  Let your doctor know if you are allergic to Chlorhexidine.    Why do I need to use CHG oral/dental rinse?  The CHG oral/dental rinse helps to kill a bacteria in your mouth known a Staphylococcus aureus.     This reduces the risk of infection at the surgical site.      Using your CHG oral/dental rinse  STEPS:  Use your CHG oral/dental rinse after you brush your teeth the night before (at bedtime) and the morning of your surgery.  Follow all directions on your prescription label.    Use the cap on the container to measure 15ml (fill cap to fill line)  Swish (gargle if you can) the mouthwash in your mouth for at least 30 seconds, (do not to swallow) spit out  After you use your CHG rinse, do not rinse your mouth with water, drink or eat.  Please refer to prescription label for the appropriate time to resume oral intake  Dental rinse comes in one size bottle: 473ml ~16oz.  You will have leftover    rinse, discard after this use.    What side effects might I have using the CHG oral/dental rinse?  CHG rinse will stick to plaque on the teeth.  Brush and floss just before use.  Teeth brushing will help avoid staining of plaque during use.    Who should I contact if I have questions about the CHG oral/dental rinse?  Please call [facility], Preadmission Testing at [phone] if you have any questions      Home Preoperative Antibacterial Shower     What is a home preoperative antibacterial shower?  This shower is a way of cleaning the skin with a germ killing  soap before surgery.  The soap contains chlorhexidine, commonly known as CHG.  CHG is a soap for your skin with germ killing ability.  Let your doctor know if you are allergic to chlorhexidine.    Why do I need to take a preoperative antibacterial shower?  Skin is not sterile.  It is best to try to make your skin as free of germs as possible before surgery.  Proper cleansing with a germ killing soap before surgery can lower the number of germs on your skin.  This helps to reduce the risk of infection at the surgical site.  Following the instructions listed below will help you prepare your skin for surgery.      How do I use the CHG skin cleanser?  Steps:  Begin using your CHG soap five days before your scheduled surgery on ________________________.    Days 1-4 Shower before bed:  Wash your face and genitals with your normal soap and rinse.    Wash and rinse your hair using the CHG soap. Rinse completely, do not condition your   Hair.          3.    Apply the CHG soap to a clean wet washcloth.  Turn the water off or move away                From the water spray to avoid premature rinsing of the CHG soap as you are applying.     4.   Lather your entire body from the neck down.  Do not use on your face or genitals.   Pay special attention to the area(s) where your incision(s) will be located unless they are on your face.  Avoid scrubbing your skin too hard.  The important point is to have the CHG soap sit on your skin for 3 minutes.    When the 3 minutes are up, turn on the water and rinse the CHG soap off your body completely.   Pat yourself dry with a clean, freshly-laundered towel.  Dress in clean, freshly laundered night clothes.    Be sure to sleep with clean, freshly laundered sheets.  Day 5:  Last shower is the morning before surgery: Follow above Instructions.    NOTE:    *Hair extensions should be removed    *Keep CHG soap out of eyes and ear canals   *DO NOT wash with regular soap on your body after you have  used the CHG        soap solution  *DO NOT apply powders, lotions, or perfume.  *Deodorant may be used days 1-4, BUT NOT the day of surgery    Who should I contact if I have any questions regarding the use of CHG soap?  Call the [facility], Preadmission Testing at [phone] if you have any questions.              Patient Information: Pre-Operative Infection Prevention Measures     Why did I have my nose, under my arms and groin swabbed?  The purpose of the swab is to identify Staphylococcus aureus inside your nose or on your skin.  The swab was sent to the laboratory for culture.  A positive swab/culture for Staphylococcus aureus is called colonization or carriage.      What is Staphylococcus aureus?  Staphylococcus aureus, also known as “staph”, is a germ found on the skin or in the nose of healthy people.  Sometimes Staphylococcus aureus can get into the body and cause an infection.  This can be minor (such as pimples, boils or other skin problems).  It might also be serious (such as blood infection, pneumonia or a surgical site infection).    What is Staphylococcus aureus colonization or carriage?  Colonization or carriage means that a person has the germ but is not sick from it.  These bacteria can be spread on the hands or when breathing or sneezing.    How is Staphylococcus aureus spread?  It is most often spread by close contact with a person or item that carries it.    What happens if my culture is positive for Staphylococcus aureus?  Your doctor/medical team will use this information to guide any antibiotic treatment which may be necessary.  Regardless of the culture results, we will clean the inside of your nose with a betadine swab just before you have your surgery.      Will I get an infection if I have Staphylococcus aureus in my nose or on my skin?  Anyone can get an infection with Staphylococcus aureus.  However, the best way to reduce your risk of infection is to  follow the instructions provided to you for the use of your CHG soap and dental rinse.        Who should I contact if I have any questions?  Call the [facility], Preadmission Testing at [phone] if you have any questions.

## 2024-06-12 NOTE — H&P (VIEW-ONLY)
[facility] Evaluation       Name: Noah Allen (Noah Allen)  /Age: 1970/54 y.o.       Date of Consult: 24     Referring Provider: Dr. Mcdonald    Surgery, Date, and Length: Penile Prosthesis Insertion, 24, 90 min.    Noah Allen is a 54 year-old male who presents to the [facility] for perioperative risk assessment prior to surgery.    Patient presents with a primary diagnosis of male erectile dysfunction.   Erectile dysfunction has been going on for seven years. Patient has failed PDE5i. GRETA 1.  No pain or curvature to penis.    Has had multiple neck surgeries. On coumadin for a history of Buergers disease.     This note was created in part upon personal review of patient's medical records.      Patient is scheduled to have a Penile Prosthesis Insertion.    Pt denies any past history of anesthetic complications such as PONV, awareness, prolonged sedation, dental damage, aspiration, cardiac arrest, difficult intubation, difficult I.V. access or unexpected hospital admissions.  NO malignant hyperthermia and or pseudocholinesterase deficiency.  No history of blood transfusions     The patient is not a [employer] and will accept blood and blood products if medically indicated.   Type and screen sent.     Past Medical History:   Diagnosis Date    Alcohol abuse     quit 20    Anemia, unspecified 2020    Mild anemia, H/H= 14/43.8 on 23    Salinas's esophagus     Buerger's disease (CMS-[facility])     managed by PCP Dr. Jain: on Coumadin, stoppage requested    Cervical radiculopathy     Cervical spondylosis     Chronic pain     CKD (chronic kidney disease)     BUN/CR= 9/0.89 with eGFR >90 on 23    Contusion of left front wall of thorax, initial encounter 10/23/2021    Contusion of left chest wall, initial encounter    Depression     Disease of spinal cord, unspecified (Multi) 2018    Cervical myelopathy    DM (diabetes mellitus) (Multi)     A1C= 5.7% on  11/21/23    ED (erectile dysfunction)     Essential (primary) hypertension 09/25/2018    HTN (hypertension), benign    GERD (gastroesophageal reflux disease)     Hematuria     following with urology    HLD (hyperlipidemia)     Hyponatremia     PR=513, 11/21/23    Lightheadedness     Long term (current) use of anticoagulants 04/04/2018    Chronic anticoagulation    Lumbar radiculopathy     Numbness     SAAD (obstructive sleep apnea)     Palpitations     Phimosis of penis     PVD (peripheral vascular disease) (CMS-[facility])     Vitamin D deficiency        Past Surgical History:   Procedure Laterality Date    CERVICAL FUSION      COLONOSCOPY      KNEE SURGERY  06/02/2015    Knee Surgery    OTHER SURGICAL HISTORY  01/26/2018    Anterior Spinal Diskectomy, Osteophytectomy Cerv Interspace    UPPER GASTROINTESTINAL ENDOSCOPY         Family History   Problem Relation Name Age of Onset    Other (cerebral infarction) Mother      Other (htn) Mother      Diabetes Father      Other (htn) Father       Social History     Socioeconomic History    Marital status:      Spouse name: Not on file    Number of children: Not on file    Years of education: Not on file    Highest education level: Not on file   Occupational History    Not on file   Tobacco Use    Smoking status: Former     Types: Cigarettes    Smokeless tobacco: Never   Vaping Use    Vaping status: Never Used   Substance and Sexual Activity    Alcohol use: Not Currently     Comment: stopped drinking 10 months ago    Drug use: Not Currently    Sexual activity: Not on file   Other Topics Concern    Not on file   Social History Narrative    Not on file     Social Determinants of Health     Financial Resource Strain: Not on file   Food Insecurity: Not on file   Transportation Needs: Not on file   Physical Activity: Not on file   Stress: Not on file   Social Connections: Not on file   Intimate Partner Violence: Not on file   Housing Stability: Not on file        Allergies    Allergen Reactions    Penicillins Anaphylaxis and Unknown    Sulfa (Sulfonamide Antibiotics) Anaphylaxis and Unknown    Lisinopril Cough     cough         Current Outpatient Medications:     amLODIPine (Norvasc) 10 mg tablet, Take 1 tablet (10 mg) by mouth once daily., Disp: 90 tablet, Rfl: 1    aspirin 81 mg EC tablet, Take 1 tablet (81 mg) by mouth once daily., Disp: , Rfl:     atorvastatin (Lipitor) 40 mg tablet, Take 1 tablet (40 mg) by mouth once daily., Disp: 90 tablet, Rfl: 1    baclofen (Lioresal) 10 mg tablet, Take 1 tablet (10 mg) by mouth 2 times a day as needed., Disp: , Rfl:     cholecalciferol (Vitamin D-3) 50 mcg (2,000 unit) capsule, Take 1 tablet by mouth early in the morning.., Disp: , Rfl:     ciclopirox (Penlac) 8 % solution, APPLY TOPICALLY ONCE DAILY. REMOVE WEEKLY, Disp: 6.6 mL, Rfl: 11    docusate sodium (Colace) 100 mg capsule, Take 1 capsule (100 mg) by mouth 2 times a day., Disp: , Rfl:     DULoxetine (Cymbalta) 60 mg DR capsule, Take 1 capsule (60 mg) by mouth 2 times a day., Disp: , Rfl:     ferrous sulfate 325 (65 Fe) MG EC tablet, Take 1 tablet (65 mg) by mouth once daily with a meal., Disp: , Rfl:     gabapentin (Neurontin) 300 mg capsule, Take 3 capsules (900 mg) by mouth 2 times a day. Take as directed, Disp: , Rfl:     losartan (Cozaar) 100 mg tablet, TAKE 1 TABLET BY MOUTH EVERY DAY, Disp: 90 tablet, Rfl: 1    metFORMIN  mg 24 hr tablet, Take 1 tablet (500 mg) by mouth 2 times a day., Disp: 180 tablet, Rfl: 1    omeprazole (PriLOSEC) 40 mg DR capsule, Take 1 capsule (40 mg) by mouth 2 times a day., Disp: , Rfl:     traMADol (Ultram) 50 mg tablet, Take 2 tablets (100 mg) by mouth 2 times a day., Disp: , Rfl:     warfarin (Coumadin) 7.5 mg tablet, TAKE 1 TABLET BY MOUTH ONCE DAILY AT BEDTIME., Disp: 90 tablet, Rfl: 0    chlorhexidine (Peridex) 0.12 % solution, Swish for 30 seconds and spit 15mL of solution the night before and morning of surgery, Disp: 475 mL, Rfl: 0     "losartan (Cozaar) 100 mg tablet, Take 1 tablet (100 mg) by mouth once daily., Disp: 90 tablet, Rfl: 1  No current facility-administered medications for this visit.      Visit Vitals  /68   Pulse 72   Temp 35.8 °C (96.4 °F)   Resp 18   Ht 1.803 m (5' 11\")   Wt 81.5 kg (179 lb 10.8 oz)   SpO2 96%   BMI 25.06 kg/m²   Smoking Status Former   BSA 2.02 m²        PAT ROS:   Constitutional:   neg    Neuro/Psych:   neg    Eyes:    use of corrective lenses  Ears:    no hearing aides  Nose:   Mouth:   neg    Throat:   neg    Neck:    neck stiffness  Cardio:    Functional 4 Mets. Patient denies SOB walking up 2 flights of stairs, yardwork, physical therapy, housework   no chest pain   no palpitations   no dyspnea   no CROCKETT  Respiratory:   Endocrine:   GI:    no abdominal pain   no constipation   no diarrhea  :   neg    Musculoskeletal:    arthralgias (generalized)   myalgias (generalized)  Hematologic:    bruises/bleeds easily   no history of blood transfusion  Skin:  neg        Physical Exam  Physical exam within normal limits.          PAT AIRWAY:   Airway:     Mallampati::  II    Neck ROM::  Limited   No broken teeth, no dentures and no missing teeth          Plan    Neuro:  Monitor for signs and symptoms of substance withdrawal during the postoperative period, assess, and treat as needed with the appropriate monitoring tool.  History of alcohol abuse, quit drinking in 2022. Previous LFT's were normal.     Cardiovascular:  Patient denies any chest pain, tightness, heaviness, pressure, radiating pain, palpitations, irregular heartbeats, lightheadedness, cough, congestion, shortness of breath, CROCKETT, PND, near syncope, weight loss or gain.    HLD:  Asa 81 mg -patient to take on dos  Lipitor-patient to take on dos    HTN:  Amlodipine-patient to take on dos   Losartan-patient to stop one day prior to surgery.       EKG in PAT on 06/12/24   Encounter Date: 06/12/24   ECG 12 Lead   Result Value    Ventricular Rate 69    " Atrial Rate 69    CA Interval 180    QRS Duration 76    QT Interval 394    QTC Calculation(Bazett) 422    P Axis 65    R Axis 86    T Axis 64    QRS Count 11    Q Onset 225    P Onset 135    P Offset 187    T Offset 422    QTC Fredericia 413    Narrative    Normal sinus rhythm  Possible Anterior infarct (cited on or before 28-SEP-2023)  Abnormal ECG  When compared with ECG of 28-SEP-2023 13:29,  No significant change was found  Confirmed by Jose Alberto Gomez ([id number]) on 6/12/2024 2:41:36 PM          RCRI: 0 Risk of Mace: 3.9%    Pulm:  SAAD-Patient asked to follow up with PCP for suspected SAAD. Recommend prioritizing  nonopioid analgesic techniques (regional and local anesthesia, nonsteroidal medications, etc) before the administration of opioids and close monitoring for hypoventilation after surgery due to suspected SAAD. If intravenous narcotics are needed beyond the immediate henrietta-operative period, the patient may benefit from continuous pulse oximetry to monitor for hypoxic events till baseline Sp02 is normal on room air and  a respiratory therapy evaluation.     Patient does not wear a CPAP    Renal/endo:  Recommendations to avoid nephrotoxic drugs and carefully monitor fluid status to maintain euvolemia. Use dose adjusted medications as needed for the underlying level of renal function.    DM-  Metformin-patient to stop one day prior to surgery    GI/:  GERD:  Prilosec-patient to take dos    Heme:    Buergers disease-patient is on Coumadin    Dr. Tori Pereira (pharm D[facility]  Patient to hold Coumadin 5 days prior to surgery.  Detailed instructions in encounters (telephone encounter 06/07/24) on Lovenox bridging. Patient aware and has follow up with [facility] on 06/14/24.     Patient instructed to ambulate as soon as possible postoperatively to decrease thromboembolic risk.    Initiate mechanical DVT prophylaxis as soon as possible and initiate chemical prophylaxis when deemed safe from a bleeding  standpoint post surgery.    Caprini= 6    Risk assessment complete.  Patient is scheduled for an intermediate surgical risk procedure.      Labs/testing obtained in PAT on 06/12/24   CBC, BMP, Coag, Urine, MRSA, EKG  Lab Results   Component Value Date    WBC 7.4 06/12/2024    HGB 13.4 (L) 06/12/2024    HCT 40.5 (L) 06/12/2024    MCV 85 06/12/2024     06/12/2024      Lab Results   Component Value Date    GLUCOSE 89 06/12/2024    CALCIUM 9.4 06/12/2024     06/12/2024    K 4.3 06/12/2024    CO2 27 06/12/2024     06/12/2024    BUN 17 06/12/2024    CREATININE 1.42 (H) 06/12/2024      Lab Results   Component Value Date    INR 3.8 (H) 06/12/2024    INR 3.10 05/31/2024    INR 3.60 05/03/2024    PROTIME 43.2 (H) 06/12/2024    PROTIME 43.4 (H) 11/29/2022    PROTIME 19.8 (H) 10/23/2021     Lab Results   Component Value Date    INR 1.3 (H) 06/18/2024    INR 3.90 06/14/2024    INR 3.8 (H) 06/12/2024    PROTIME 14.6 (H) 06/18/2024    PROTIME 43.2 (H) 06/12/2024    PROTIME 43.4 (H) 11/29/2022          CBC, BMP, and Coags reviewed.  H/H slightly decreased. Patient has a history of anemia.  Cr slightly elevated, patient has a history of CKD.          Preoperative medication instructions were provided and reviewed with the patient.  Any additional testing or evaluation was explained to the patient.  Nothing by mouth instructions were discussed and patient's questions were answered prior to conclusion to this encounter.  Patient verbalized understanding of preoperative instructions given in preadmission testing; discharge instructions available in EMR.    This note was dictated with speech recognition.  Minor errors may have been detected during use of speech recognition.

## 2024-06-12 NOTE — CPM/PAT NURSE NOTE
CPM/PAT Nurse Note      Name: Noah Allen (Noah Allen)  /Age: 1970/54 y.o.       Past Medical History:   Diagnosis Date    Alcohol abuse     quit 22    Anemia, unspecified 2020    Mild anemia, H/H= 14/43.8 on 23    Salinas's esophagus     Buerger's disease (CMS-HCC)     managed by PCP Dr. Jain: on Coumadin, stoppage requested    Cervical radiculopathy     Cervical spondylosis     Chronic pain     CKD (chronic kidney disease)     BUN/CR= 9/0.89 with eGFR >90 on 23    Contusion of left front wall of thorax, initial encounter 10/23/2021    Contusion of left chest wall, initial encounter    Depression     Disease of spinal cord, unspecified (Multi) 2018    Cervical myelopathy    DM (diabetes mellitus) (Multi)     A1C= 5.7% on 23    ED (erectile dysfunction)     Essential (primary) hypertension 2018    HTN (hypertension), benign    GERD (gastroesophageal reflux disease)     Hematuria     following with urology    HLD (hyperlipidemia)     Hyponatremia     GW=716, 23    Lightheadedness     Long term (current) use of anticoagulants 2018    Chronic anticoagulation    Lumbar radiculopathy     Numbness     SAAD (obstructive sleep apnea)     Palpitations     Phimosis of penis     PVD (peripheral vascular disease) (CMS-HCC)     Vitamin D deficiency        Past Surgical History:   Procedure Laterality Date    CERVICAL FUSION      COLONOSCOPY      KNEE SURGERY  2015    Knee Surgery    OTHER SURGICAL HISTORY  2018    Anterior Spinal Diskectomy, Osteophytectomy Cerv Interspace    UPPER GASTROINTESTINAL ENDOSCOPY         Patient  has no history on file for sexual activity.    Family History   Problem Relation Name Age of Onset    Other (cerebral infarction) Mother      Other (htn) Mother      Diabetes Father      Other (htn) Father         Allergies   Allergen Reactions    Penicillins Anaphylaxis    Sulfa (Sulfonamide Antibiotics) Anaphylaxis     Lisinopril Cough     cough       Prior to Admission medications    Medication Sig Start Date End Date Taking? Authorizing Provider   amLODIPine (Norvasc) 10 mg tablet Take 1 tablet (10 mg) by mouth once daily. 3/28/24   Braulio Jain MD   aspirin 81 mg EC tablet Take 1 tablet (81 mg) by mouth once daily. 1/19/17   Historical Provider, MD   atorvastatin (Lipitor) 40 mg tablet Take 1 tablet (40 mg) by mouth once daily. 3/28/24   Braulio Jain MD   baclofen (Lioresal) 10 mg tablet Take 1 tablet (10 mg) by mouth 2 times a day as needed. 12/4/21   Historical Provider, MD   chlorhexidine (Peridex) 0.12 % solution Swish for 30 seconds and spit 15mL of solution the night before and morning of surgery 6/12/24   Roxie Nieves APRN-CNP   cholecalciferol (Vitamin D-3) 50 mcg (2,000 unit) capsule Take 1 tablet by mouth early in the morning.. 7/24/20   Historical Provider, MD   ciclopirox (Penlac) 8 % solution APPLY TOPICALLY ONCE DAILY. REMOVE WEEKLY 2/28/24   Sarah Whitley DPWASHINGTON   docusate sodium (Colace) 100 mg capsule Take 1 capsule (100 mg) by mouth 2 times a day.    Historical Provider, MD   DULoxetine (Cymbalta) 60 mg DR capsule Take 1 capsule (60 mg) by mouth 2 times a day.    Historical Provider, MD   ferrous sulfate 325 (65 Fe) MG EC tablet Take 1 tablet (65 mg) by mouth once daily with a meal. 7/24/20   Historical Provider, MD   gabapentin (Neurontin) 300 mg capsule Take 3 capsules (900 mg) by mouth 2 times a day. Take as directed    Historical Provider, MD   losartan (Cozaar) 100 mg tablet TAKE 1 TABLET BY MOUTH EVERY DAY 3/28/24   Braulio Jain MD   losartan (Cozaar) 100 mg tablet Take 1 tablet (100 mg) by mouth once daily. 3/28/24 9/24/24  Braulio Jain MD   metFORMIN  mg 24 hr tablet Take 1 tablet (500 mg) by mouth 2 times a day. 11/30/23   Braulio Jain MD   omeprazole (PriLOSEC) 40 mg DR capsule Take 1 capsule (40 mg) by mouth 2 times a day.    Historical Provider, MD   traMADol (Ultram) 50 mg  tablet Take 2 tablets (100 mg) by mouth 2 times a day. 5/12/24 6/12/24  Historical Provider, MD   warfarin (Coumadin) 7.5 mg tablet TAKE 1 TABLET BY MOUTH ONCE DAILY AT BEDTIME. 5/31/24   Braulio Jain MD   acetaminophen (Tylenol) 500 mg tablet Take 1 tablet (500 mg) by mouth every 6 hours if needed.  6/12/24  Historical Provider, MD   gabapentin (Neurontin) 300 mg capsule Take 2 capsules (600 mg) by mouth every 8 hours.  6/12/24  Historical Provider, MD   topiramate (Topamax) 25 mg tablet Take 2 tablets (50 mg) by mouth once daily at bedtime. 5/10/23 6/12/24  Historical Provider, MD        PAT ROS     DASI Risk Score    No data to display       Caprini DVT Assessment    No data to display       Modified Frailty Index    No data to display       CHADS2 Stroke Risk  Current as of 54 minutes ago        N/A 3 to 100%: High Risk   2 to < 3%: Medium Risk   0 to < 2%: Low Risk     Last Change: N/A          This score determines the patient's risk of having a stroke if the patient has atrial fibrillation.        This score is not applicable to this patient. Components are not calculated.          Revised Cardiac Risk Index    No data to display       Apfel Simplified Score    No data to display       Risk Analysis Index Results This Encounter    No data found in the last 1 encounters.         Nurse Plan of Action:     After Visit Summary (AVS) reviewed and patient verbalized good understanding of medications and NPO instructions.  Pre-op infection prevention measures:  CHG showers and mouthwash reviewed, understanding voiced.  CHG soap given and patient verbalized need to pick CHG mouthwash at their preferred local pharmacy.

## 2024-06-14 ENCOUNTER — ANTICOAGULATION - WARFARIN VISIT (OUTPATIENT)
Dept: PHARMACY | Facility: CLINIC | Age: 54
End: 2024-06-14
Payer: MEDICARE

## 2024-06-14 DIAGNOSIS — I73.1 THROMBOANGIITIS OBLITERANS (BUERGER'S DISEASE) (CMS-HCC): Primary | ICD-10-CM

## 2024-06-14 LAB
POC INR: 3.9
POC PROTHROMBIN TIME: NORMAL
STAPHYLOCOCCUS SPEC CULT: NORMAL

## 2024-06-14 PROCEDURE — 99212 OFFICE O/P EST SF 10 MIN: CPT | Performed by: PHARMACIST

## 2024-06-14 PROCEDURE — 85610 PROTHROMBIN TIME: CPT | Mod: QW | Performed by: PHARMACIST

## 2024-06-14 NOTE — PROGRESS NOTES
Verified current dose with pt.  No new meds or med changes since last visit.  Pt denies unusual bleed/bruise.  No upcoming procedures.  Inr = 3.9  Pt did not eat yesterday or today - feeling dizzy.  Pt took 2 tylenol yesterday and 2 today.   Pt eats his greens - told him to have some extra greens over next 1-2 days to help inr come down.    Continue same dose and check again on Monday 6/24/24

## 2024-06-17 ENCOUNTER — HOSPITAL ENCOUNTER (OUTPATIENT)
Dept: RADIOLOGY | Facility: HOSPITAL | Age: 54
Discharge: HOME | End: 2024-06-17
Payer: MEDICARE

## 2024-06-17 ENCOUNTER — TELEPHONE (OUTPATIENT)
Dept: PREADMISSION TESTING | Facility: HOSPITAL | Age: 54
End: 2024-06-17
Payer: MEDICARE

## 2024-06-17 DIAGNOSIS — M54.42 LEFT-SIDED LOW BACK PAIN WITH LEFT-SIDED SCIATICA, UNSPECIFIED CHRONICITY: ICD-10-CM

## 2024-06-17 DIAGNOSIS — M54.50 LOW BACK PAIN, UNSPECIFIED BACK PAIN LATERALITY, UNSPECIFIED CHRONICITY, UNSPECIFIED WHETHER SCIATICA PRESENT: ICD-10-CM

## 2024-06-17 PROCEDURE — 72114 X-RAY EXAM L-S SPINE BENDING: CPT | Performed by: RADIOLOGY

## 2024-06-17 PROCEDURE — 72070 X-RAY EXAM THORAC SPINE 2VWS: CPT

## 2024-06-17 PROCEDURE — 72114 X-RAY EXAM L-S SPINE BENDING: CPT

## 2024-06-18 ENCOUNTER — ANESTHESIA EVENT (OUTPATIENT)
Dept: OPERATING ROOM | Facility: HOSPITAL | Age: 54
End: 2024-06-18
Payer: MEDICARE

## 2024-06-18 ENCOUNTER — LAB (OUTPATIENT)
Dept: LAB | Facility: LAB | Age: 54
End: 2024-06-18
Payer: MEDICARE

## 2024-06-18 DIAGNOSIS — Z01.818 PRE-OP TESTING: ICD-10-CM

## 2024-06-18 DIAGNOSIS — Z79.01 ANTICOAGULATED ON COUMADIN: ICD-10-CM

## 2024-06-18 LAB
APTT PPP: 52 SECONDS (ref 27–38)
INR PPP: 1.3 (ref 0.9–1.1)
PROTHROMBIN TIME: 14.6 SECONDS (ref 9.8–12.8)

## 2024-06-18 PROCEDURE — 85610 PROTHROMBIN TIME: CPT

## 2024-06-18 PROCEDURE — 36415 COLL VENOUS BLD VENIPUNCTURE: CPT

## 2024-06-18 PROCEDURE — 85730 THROMBOPLASTIN TIME PARTIAL: CPT

## 2024-06-18 NOTE — ANESTHESIA PREPROCEDURE EVALUATION
Patient: Noah Allen    Procedure Information       Date/Time: 06/19/24 1100    Procedure: Penile Prosthesis Insertion (Penis)    Location: [facility] A OR 01 / Virtual [facility] A OR    Surgeons: Nitish Mcdonald MD            Relevant Problems   Cardiac   (+) Chest pain   (+) Hyperlipidemia   (+) Hypertension   (+) Ischemic finger   (+) Peripheral vascular disease (CMS-HCC)      Pulmonary   (+) SAAD (obstructive sleep apnea)      Neuro   (+) Cervical radiculopathy   (+) Lumbar radiculopathy   (+) Situational depression      GI   (+) Gastroesophageal reflux disease      /Renal   (+) Hyponatremia      Endocrine   (+) Obesity   (+) Type 2 diabetes mellitus without complication, without long-term current use of insulin (Multi)      Hematology   (+) Anemia, iron deficiency   (+) Anemia, normocytic normochromic   (+) Coagulation disorder (Multi)      Musculoskeletal   (+) Cervical spondylosis   (+) Cervical stenosis of spine      HEENT   (+) Acute sinusitis      ID   (+) Onychomycosis       Clinical information reviewed:    Allergies                NPO Detail:  No data recorded     There were no vitals filed for this visit.    Past Surgical History:   Procedure Laterality Date    CERVICAL FUSION      COLONOSCOPY      KNEE SURGERY  06/02/2015    Knee Surgery    OTHER SURGICAL HISTORY  01/26/2018    Anterior Spinal Diskectomy, Osteophytectomy Cerv Interspace    UPPER GASTROINTESTINAL ENDOSCOPY       Past Medical History:   Diagnosis Date    Alcohol abuse     quit 12/1/22    Anemia, unspecified 06/29/2020    Mild anemia, H/H= 14/43.8 on 9/28/23    Slainas's esophagus     Buerger's disease (CMS-[facility])     managed by PCP Dr. Jain: on Coumadin, stoppage requested    Cervical radiculopathy     Cervical spondylosis     Chronic pain     CKD (chronic kidney disease)     BUN/CR= 9/0.89 with eGFR >90 on 11/21/23    Contusion of left front wall of thorax, initial encounter 10/23/2021    Contusion of left chest wall, initial encounter     Depression     Disease of spinal cord, unspecified (Multi) 04/02/2018    Cervical myelopathy    DM (diabetes mellitus) (Multi)     A1C= 5.7% on 11/21/23    ED (erectile dysfunction)     Essential (primary) hypertension 09/25/2018    HTN (hypertension), benign    GERD (gastroesophageal reflux disease)     Hematuria     following with urology    HLD (hyperlipidemia)     Hyponatremia     QI=563, 11/21/23    Lightheadedness     Long term (current) use of anticoagulants 04/04/2018    Chronic anticoagulation    Lumbar radiculopathy     Numbness     SAAD (obstructive sleep apnea)     Palpitations     Phimosis of penis     PVD (peripheral vascular disease) (CMS-[facility])     Vitamin D deficiency      No current facility-administered medications for this encounter.    Current Outpatient Medications:     amLODIPine (Norvasc) 10 mg tablet, Take 1 tablet (10 mg) by mouth once daily., Disp: 90 tablet, Rfl: 1    aspirin 81 mg EC tablet, Take 1 tablet (81 mg) by mouth once daily., Disp: , Rfl:     atorvastatin (Lipitor) 40 mg tablet, Take 1 tablet (40 mg) by mouth once daily., Disp: 90 tablet, Rfl: 1    baclofen (Lioresal) 10 mg tablet, Take 1 tablet (10 mg) by mouth 2 times a day as needed., Disp: , Rfl:     chlorhexidine (Peridex) 0.12 % solution, Swish for 30 seconds and spit 15mL of solution the night before and morning of surgery, Disp: 475 mL, Rfl: 0    cholecalciferol (Vitamin D-3) 50 mcg (2,000 unit) capsule, Take 1 tablet by mouth early in the morning.., Disp: , Rfl:     ciclopirox (Penlac) 8 % solution, APPLY TOPICALLY ONCE DAILY. REMOVE WEEKLY, Disp: 6.6 mL, Rfl: 11    docusate sodium (Colace) 100 mg capsule, Take 1 capsule (100 mg) by mouth 2 times a day., Disp: , Rfl:     DULoxetine (Cymbalta) 60 mg DR capsule, Take 1 capsule (60 mg) by mouth 2 times a day., Disp: , Rfl:     ferrous sulfate 325 (65 Fe) MG EC tablet, Take 1 tablet (65 mg) by mouth once daily with a meal., Disp: , Rfl:     gabapentin (Neurontin) 300 mg  capsule, Take 3 capsules (900 mg) by mouth 2 times a day. Take as directed, Disp: , Rfl:     losartan (Cozaar) 100 mg tablet, TAKE 1 TABLET BY MOUTH EVERY DAY, Disp: 90 tablet, Rfl: 1    losartan (Cozaar) 100 mg tablet, Take 1 tablet (100 mg) by mouth once daily., Disp: 90 tablet, Rfl: 1    metFORMIN  mg 24 hr tablet, Take 1 tablet (500 mg) by mouth 2 times a day., Disp: 180 tablet, Rfl: 1    omeprazole (PriLOSEC) 40 mg DR capsule, Take 1 capsule (40 mg) by mouth 2 times a day., Disp: , Rfl:     warfarin (Coumadin) 7.5 mg tablet, TAKE 1 TABLET BY MOUTH ONCE DAILY AT BEDTIME., Disp: 90 tablet, Rfl: 0  Prior to Admission medications    Medication Sig Start Date End Date Taking? Authorizing Provider   amLODIPine (Norvasc) 10 mg tablet Take 1 tablet (10 mg) by mouth once daily. 3/28/24   Braulio Jain MD   aspirin 81 mg EC tablet Take 1 tablet (81 mg) by mouth once daily. 1/19/17   Historical Provider, MD   atorvastatin (Lipitor) 40 mg tablet Take 1 tablet (40 mg) by mouth once daily. 3/28/24   Braulio Jain MD   baclofen (Lioresal) 10 mg tablet Take 1 tablet (10 mg) by mouth 2 times a day as needed. 12/4/21   Historical Provider, MD   chlorhexidine (Peridex) 0.12 % solution Swish for 30 seconds and spit 15mL of solution the night before and morning of surgery 6/12/24   Roxie Nieves APRN-CNP   cholecalciferol (Vitamin D-3) 50 mcg (2,000 unit) capsule Take 1 tablet by mouth early in the morning.. 7/24/20   Historical Provider, MD   ciclopirox (Penlac) 8 % solution APPLY TOPICALLY ONCE DAILY. REMOVE WEEKLY 2/28/24   Sarah Whitley DPM   docusate sodium (Colace) 100 mg capsule Take 1 capsule (100 mg) by mouth 2 times a day.    Historical Provider, MD   DULoxetine (Cymbalta) 60 mg DR capsule Take 1 capsule (60 mg) by mouth 2 times a day.    Historical Provider, MD   ferrous sulfate 325 (65 Fe) MG EC tablet Take 1 tablet (65 mg) by mouth once daily with a meal. 7/24/20   Historical Provider, MD   gabapentin  (Neurontin) 300 mg capsule Take 3 capsules (900 mg) by mouth 2 times a day. Take as directed    Historical Provider, MD   losartan (Cozaar) 100 mg tablet TAKE 1 TABLET BY MOUTH EVERY DAY 3/28/24   Braulio Jain MD   losartan (Cozaar) 100 mg tablet Take 1 tablet (100 mg) by mouth once daily. 3/28/24 9/24/24  Braulio Jain MD   metFORMIN  mg 24 hr tablet Take 1 tablet (500 mg) by mouth 2 times a day. 11/30/23   Braulio Jain MD   omeprazole (PriLOSEC) 40 mg DR capsule Take 1 capsule (40 mg) by mouth 2 times a day.    Historical Provider, MD   traMADol (Ultram) 50 mg tablet Take 2 tablets (100 mg) by mouth 2 times a day. 5/12/24 6/12/24  Historical Provider, MD   warfarin (Coumadin) 7.5 mg tablet TAKE 1 TABLET BY MOUTH ONCE DAILY AT BEDTIME. 5/31/24   Braulio Jain MD   acetaminophen (Tylenol) 500 mg tablet Take 1 tablet (500 mg) by mouth every 6 hours if needed.  6/12/24  Historical Provider, MD   gabapentin (Neurontin) 300 mg capsule Take 2 capsules (600 mg) by mouth every 8 hours.  6/12/24  Historical Provider, MD   topiramate (Topamax) 25 mg tablet Take 2 tablets (50 mg) by mouth once daily at bedtime. 5/10/23 6/12/24  Historical Provider, MD     Allergies   Allergen Reactions    Penicillins Anaphylaxis    Sulfa (Sulfonamide Antibiotics) Anaphylaxis    Lisinopril Cough     cough     Social History     Tobacco Use    Smoking status: Former     Types: Cigarettes    Smokeless tobacco: Never   Substance Use Topics    Alcohol use: Not Currently     Comment: stopped drinking 10 months ago         Chemistry    Lab Results   Component Value Date/Time     06/12/2024 1502     06/12/2024 1502    K 4.3 06/12/2024 1502    K 4.3 06/12/2024 1502     06/12/2024 1502     06/12/2024 1502    CO2 27 06/12/2024 1502    CO2 25 06/12/2024 1502    BUN 17 06/12/2024 1502    BUN 17 06/12/2024 1502    CREATININE 1.42 (H) 06/12/2024 1502    CREATININE 1.35 (H) 06/12/2024 1505    Lab Results   Component  Value Date/Time    CALCIUM 9.4 06/12/2024 1502    CALCIUM 9.2 06/12/2024 1502    ALKPHOS 125 (H) 06/12/2024 1502    AST 13 06/12/2024 1502    ALT 12 06/12/2024 1502    BILITOT 0.4 06/12/2024 1502          Lab Results   Component Value Date/Time    WBC 7.4 06/12/2024 1502    HGB 13.4 (L) 06/12/2024 1502    HCT 40.5 (L) 06/12/2024 1502     06/12/2024 1502     Lab Results   Component Value Date/Time    PROTIME 14.6 (H) 06/18/2024 0947    INR 1.3 (H) 06/18/2024 0947    INR 3.90 06/14/2024 0000     Encounter Date: 06/12/24   ECG 12 Lead   Result Value    Ventricular Rate 69    Atrial Rate 69    KS Interval 180    QRS Duration 76    QT Interval 394    QTC Calculation(Bazett) 422    P Axis 65    R Axis 86    T Axis 64    QRS Count 11    Q Onset 225    P Onset 135    P Offset 187    T Offset 422    QTC Fredericia 413    Narrative    Normal sinus rhythm  Possible Anterior infarct (cited on or before 28-SEP-2023)  Abnormal ECG  When compared with ECG of 28-SEP-2023 13:29,  No significant change was found  Confirmed by Jose Alberto Gomez ([id number]) on 6/12/2024 2:41:36 PM     No results found for this or any previous visit from the past 1095 days.    Physical Exam    Airway  Mallampati: II  TM distance: >3 FB  Neck ROM: full     Cardiovascular   Rhythm: regular  Rate: normal     Dental - normal exam     Pulmonary - normal exam     Abdominal - normal exam             Anesthesia Plan    History of general anesthesia?: yes  History of complications of general anesthesia?: no    ASA 3     general     intravenous induction   Postoperative administration of opioids is intended.  Trial extubation is planned.  Anesthetic plan and risks discussed with patient.  Use of blood products discussed with patient who consented to blood products.    Plan discussed with CRNA and attending.

## 2024-06-19 ENCOUNTER — HOSPITAL ENCOUNTER (OUTPATIENT)
Facility: HOSPITAL | Age: 54
Setting detail: OUTPATIENT SURGERY
Discharge: HOME | End: 2024-06-19
Attending: UROLOGY | Admitting: UROLOGY
Payer: MEDICARE

## 2024-06-19 ENCOUNTER — ANESTHESIA (OUTPATIENT)
Dept: OPERATING ROOM | Facility: HOSPITAL | Age: 54
End: 2024-06-19
Payer: MEDICARE

## 2024-06-19 VITALS
DIASTOLIC BLOOD PRESSURE: 65 MMHG | WEIGHT: 176.59 LBS | TEMPERATURE: 97.2 F | OXYGEN SATURATION: 97 % | BODY MASS INDEX: 24.72 KG/M2 | RESPIRATION RATE: 17 BRPM | SYSTOLIC BLOOD PRESSURE: 111 MMHG | HEART RATE: 76 BPM | HEIGHT: 71 IN

## 2024-06-19 DIAGNOSIS — G89.18 ACUTE POST-OPERATIVE PAIN: ICD-10-CM

## 2024-06-19 DIAGNOSIS — Z79.2 NEED FOR ANTIBIOTIC PROPHYLAXIS FOR SURGICAL PROCEDURE: ICD-10-CM

## 2024-06-19 DIAGNOSIS — N52.8 OTHER MALE ERECTILE DYSFUNCTION: Primary | ICD-10-CM

## 2024-06-19 LAB — GLUCOSE BLD MANUAL STRIP-MCNC: 115 MG/DL (ref 74–99)

## 2024-06-19 PROCEDURE — 7100000009 HC PHASE TWO TIME - INITIAL BASE CHARGE: Performed by: UROLOGY

## 2024-06-19 PROCEDURE — C1813 PROSTHESIS, PENILE, INFLATAB: HCPCS | Performed by: UROLOGY

## 2024-06-19 PROCEDURE — 2500000001 HC RX 250 WO HCPCS SELF ADMINISTERED DRUGS (ALT 637 FOR MEDICARE OP): Performed by: UROLOGY

## 2024-06-19 PROCEDURE — 2780000003 HC OR 278 NO HCPCS: Performed by: UROLOGY

## 2024-06-19 PROCEDURE — 2500000001 HC RX 250 WO HCPCS SELF ADMINISTERED DRUGS (ALT 637 FOR MEDICARE OP): Performed by: STUDENT IN AN ORGANIZED HEALTH CARE EDUCATION/TRAINING PROGRAM

## 2024-06-19 PROCEDURE — 2500000004 HC RX 250 GENERAL PHARMACY W/ HCPCS (ALT 636 FOR OP/ED): Performed by: NURSE ANESTHETIST, CERTIFIED REGISTERED

## 2024-06-19 PROCEDURE — 3600000003 HC OR TIME - INITIAL BASE CHARGE - PROCEDURE LEVEL THREE: Performed by: UROLOGY

## 2024-06-19 PROCEDURE — 7100000010 HC PHASE TWO TIME - EACH INCREMENTAL 1 MINUTE: Performed by: UROLOGY

## 2024-06-19 PROCEDURE — C1713 ANCHOR/SCREW BN/BN,TIS/BN: HCPCS | Performed by: UROLOGY

## 2024-06-19 PROCEDURE — 7100000002 HC RECOVERY ROOM TIME - EACH INCREMENTAL 1 MINUTE: Performed by: UROLOGY

## 2024-06-19 PROCEDURE — 3600000008 HC OR TIME - EACH INCREMENTAL 1 MINUTE - PROCEDURE LEVEL THREE: Performed by: UROLOGY

## 2024-06-19 PROCEDURE — 82947 ASSAY GLUCOSE BLOOD QUANT: CPT

## 2024-06-19 PROCEDURE — 2500000005 HC RX 250 GENERAL PHARMACY W/O HCPCS: Performed by: NURSE ANESTHETIST, CERTIFIED REGISTERED

## 2024-06-19 PROCEDURE — 3700000002 HC GENERAL ANESTHESIA TIME - EACH INCREMENTAL 1 MINUTE: Performed by: UROLOGY

## 2024-06-19 PROCEDURE — 54405 INSERT MULTI-COMP PENIS PROS: CPT | Performed by: UROLOGY

## 2024-06-19 PROCEDURE — 2500000005 HC RX 250 GENERAL PHARMACY W/O HCPCS: Performed by: STUDENT IN AN ORGANIZED HEALTH CARE EDUCATION/TRAINING PROGRAM

## 2024-06-19 PROCEDURE — 2500000004 HC RX 250 GENERAL PHARMACY W/ HCPCS (ALT 636 FOR OP/ED): Performed by: UROLOGY

## 2024-06-19 PROCEDURE — 3700000001 HC GENERAL ANESTHESIA TIME - INITIAL BASE CHARGE: Performed by: UROLOGY

## 2024-06-19 PROCEDURE — 7100000001 HC RECOVERY ROOM TIME - INITIAL BASE CHARGE: Performed by: UROLOGY

## 2024-06-19 DEVICE — INFRAPUBIC ZERO DEGREE ANGLE CYLINDER SET WITH PUMP
Type: IMPLANTABLE DEVICE | Site: PENIS | Status: FUNCTIONAL
Brand: TITAN

## 2024-06-19 RX ORDER — VANCOMYCIN HYDROCHLORIDE 1 G/200ML
1000 INJECTION, SOLUTION INTRAVENOUS ONCE
Status: COMPLETED | OUTPATIENT
Start: 2024-06-19 | End: 2024-06-19

## 2024-06-19 RX ORDER — OXYCODONE HYDROCHLORIDE 5 MG/1
5 TABLET ORAL EVERY 6 HOURS PRN
Qty: 15 TABLET | Refills: 0 | Status: SHIPPED | OUTPATIENT
Start: 2024-06-19

## 2024-06-19 RX ORDER — ACETAMINOPHEN 325 MG/1
650 TABLET ORAL ONCE
Status: COMPLETED | OUTPATIENT
Start: 2024-06-19 | End: 2024-06-19

## 2024-06-19 RX ORDER — ENOXAPARIN SODIUM 100 MG/ML
80 INJECTION SUBCUTANEOUS DAILY
COMMUNITY

## 2024-06-19 RX ORDER — CHLORHEXIDINE GLUCONATE 40 MG/ML
SOLUTION TOPICAL DAILY PRN
Status: DISCONTINUED | OUTPATIENT
Start: 2024-06-19 | End: 2024-06-19 | Stop reason: HOSPADM

## 2024-06-19 RX ORDER — ONDANSETRON HYDROCHLORIDE 2 MG/ML
4 INJECTION, SOLUTION INTRAVENOUS ONCE AS NEEDED
Status: DISCONTINUED | OUTPATIENT
Start: 2024-06-19 | End: 2024-06-19 | Stop reason: HOSPADM

## 2024-06-19 RX ORDER — FLUCONAZOLE 2 MG/ML
200 INJECTION, SOLUTION INTRAVENOUS ONCE
Status: COMPLETED | OUTPATIENT
Start: 2024-06-19 | End: 2024-06-19

## 2024-06-19 RX ORDER — ALBUTEROL SULFATE 0.83 MG/ML
2.5 SOLUTION RESPIRATORY (INHALATION) ONCE AS NEEDED
Status: DISCONTINUED | OUTPATIENT
Start: 2024-06-19 | End: 2024-06-19 | Stop reason: HOSPADM

## 2024-06-19 RX ORDER — FENTANYL CITRATE 50 UG/ML
INJECTION, SOLUTION INTRAMUSCULAR; INTRAVENOUS AS NEEDED
Status: DISCONTINUED | OUTPATIENT
Start: 2024-06-19 | End: 2024-06-19

## 2024-06-19 RX ORDER — IBUPROFEN 600 MG/1
600 TABLET ORAL EVERY 6 HOURS PRN
Qty: 30 TABLET | Refills: 0 | Status: SHIPPED | OUTPATIENT
Start: 2024-06-19

## 2024-06-19 RX ORDER — SODIUM CHLORIDE, SODIUM LACTATE, POTASSIUM CHLORIDE, CALCIUM CHLORIDE 600; 310; 30; 20 MG/100ML; MG/100ML; MG/100ML; MG/100ML
100 INJECTION, SOLUTION INTRAVENOUS CONTINUOUS
Status: DISCONTINUED | OUTPATIENT
Start: 2024-06-19 | End: 2024-06-19 | Stop reason: HOSPADM

## 2024-06-19 RX ORDER — ACETAMINOPHEN 325 MG/1
650 TABLET ORAL EVERY 4 HOURS PRN
Status: DISCONTINUED | OUTPATIENT
Start: 2024-06-19 | End: 2024-06-19 | Stop reason: HOSPADM

## 2024-06-19 RX ORDER — OXYCODONE HYDROCHLORIDE 5 MG/1
5 TABLET ORAL EVERY 4 HOURS PRN
Status: DISCONTINUED | OUTPATIENT
Start: 2024-06-19 | End: 2024-06-19 | Stop reason: HOSPADM

## 2024-06-19 RX ORDER — HYDRALAZINE HYDROCHLORIDE 20 MG/ML
5 INJECTION INTRAMUSCULAR; INTRAVENOUS EVERY 30 MIN PRN
Status: DISCONTINUED | OUTPATIENT
Start: 2024-06-19 | End: 2024-06-19 | Stop reason: HOSPADM

## 2024-06-19 RX ORDER — PROPOFOL 10 MG/ML
INJECTION, EMULSION INTRAVENOUS AS NEEDED
Status: DISCONTINUED | OUTPATIENT
Start: 2024-06-19 | End: 2024-06-19

## 2024-06-19 RX ORDER — LIDOCAINE HYDROCHLORIDE 10 MG/ML
0.1 INJECTION, SOLUTION EPIDURAL; INFILTRATION; INTRACAUDAL; PERINEURAL ONCE
Status: DISCONTINUED | OUTPATIENT
Start: 2024-06-19 | End: 2024-06-19 | Stop reason: HOSPADM

## 2024-06-19 RX ORDER — GABAPENTIN 300 MG/1
600 CAPSULE ORAL ONCE
Status: DISCONTINUED | OUTPATIENT
Start: 2024-06-19 | End: 2024-06-19 | Stop reason: HOSPADM

## 2024-06-19 RX ORDER — LABETALOL HYDROCHLORIDE 5 MG/ML
5 INJECTION, SOLUTION INTRAVENOUS ONCE AS NEEDED
Status: DISCONTINUED | OUTPATIENT
Start: 2024-06-19 | End: 2024-06-19 | Stop reason: HOSPADM

## 2024-06-19 RX ORDER — MIDAZOLAM HYDROCHLORIDE 1 MG/ML
INJECTION INTRAMUSCULAR; INTRAVENOUS AS NEEDED
Status: DISCONTINUED | OUTPATIENT
Start: 2024-06-19 | End: 2024-06-19

## 2024-06-19 RX ORDER — OXYCODONE HYDROCHLORIDE 5 MG/1
10 TABLET ORAL EVERY 4 HOURS PRN
Status: DISCONTINUED | OUTPATIENT
Start: 2024-06-19 | End: 2024-06-19 | Stop reason: HOSPADM

## 2024-06-19 RX ORDER — CIPROFLOXACIN 500 MG/1
500 TABLET ORAL 2 TIMES DAILY
Qty: 14 TABLET | Refills: 0 | Status: SHIPPED | OUTPATIENT
Start: 2024-06-19 | End: 2024-06-26

## 2024-06-19 RX ORDER — ACETAMINOPHEN 500 MG
1000 TABLET ORAL EVERY 6 HOURS PRN
Qty: 30 TABLET | Refills: 0 | Status: SHIPPED | OUTPATIENT
Start: 2024-06-19

## 2024-06-19 RX ORDER — LIDOCAINE HYDROCHLORIDE 20 MG/ML
INJECTION, SOLUTION INFILTRATION; PERINEURAL AS NEEDED
Status: DISCONTINUED | OUTPATIENT
Start: 2024-06-19 | End: 2024-06-19

## 2024-06-19 RX ORDER — DROPERIDOL 2.5 MG/ML
0.62 INJECTION, SOLUTION INTRAMUSCULAR; INTRAVENOUS ONCE AS NEEDED
Status: DISCONTINUED | OUTPATIENT
Start: 2024-06-19 | End: 2024-06-19 | Stop reason: HOSPADM

## 2024-06-19 RX ORDER — ONDANSETRON HYDROCHLORIDE 2 MG/ML
INJECTION, SOLUTION INTRAVENOUS AS NEEDED
Status: DISCONTINUED | OUTPATIENT
Start: 2024-06-19 | End: 2024-06-19

## 2024-06-19 ASSESSMENT — PAIN SCALES - GENERAL
PAINLEVEL_OUTOF10: 5 - MODERATE PAIN
PAINLEVEL_OUTOF10: 5 - MODERATE PAIN
PAINLEVEL_OUTOF10: 0 - NO PAIN
PAINLEVEL_OUTOF10: 5 - MODERATE PAIN
PAINLEVEL_OUTOF10: 5 - MODERATE PAIN
PAINLEVEL_OUTOF10: 0 - NO PAIN

## 2024-06-19 ASSESSMENT — PAIN - FUNCTIONAL ASSESSMENT
PAIN_FUNCTIONAL_ASSESSMENT: 0-10
PAIN_FUNCTIONAL_ASSESSMENT: UNABLE TO SELF-REPORT
PAIN_FUNCTIONAL_ASSESSMENT: 0-10
PAIN_FUNCTIONAL_ASSESSMENT: UNABLE TO SELF-REPORT

## 2024-06-19 ASSESSMENT — PAIN DESCRIPTION - LOCATION: LOCATION: PENIS

## 2024-06-19 NOTE — OP NOTE
Penile Prosthesis Insertion Operative Note     Date: 2024  OR Location: [facility] A OR    Name: Noah Allen, : 1970, Age: 54 y.o., MRN: [id number], Sex: male    Diagnosis  Pre-op Diagnosis     * Other male erectile dysfunction [N52.8] Post-op Diagnosis     * Other male erectile dysfunction [N52.8]     Procedures  Penile Prosthesis Insertion  50818 - MS INSJ MULTI-COMPONENT INFLATABLE PENILE PROSTH      Surgeons      * Nitish Mcdonald - Primary    Resident/Fellow/Other Assistant:  Surgeons and Role:     * Malik Parra MD - Resident - Assisting    Procedure Summary  Anesthesia: General  ASA: III  Anesthesia Staff: Anesthesiologist: Marcelina Almanzar MD  CRNA: VU Ascencio-CRNA  Estimated Blood Loss: 10mL  Intra-op Medications:   Administrations occurring from 1100 to 1230 on 24:   Medication Name Total Dose   BUPivacaine HCl (Marcaine) 0.5 % (5 mg/mL) 29 mL, dexAMETHasone (Decadron) 4 mg syringe 30 mL   bupivacaine PF (Marcaine) 0.25 % (2.5 mg/mL) 30 mL in sodium chloride 0.9% 30 mL syringe 60 mL   thrombin (Human)-fibrinogen-aprotinin-calcium (Tisseel) 4 mL 4 mL   gentamicin (Garamycin) 120 mg in dextrose 5% 100 mL IV Cannot be calculated              Anesthesia Record               Intraprocedure I/O Totals          Intake    LR bolus 750.00 mL    Total Intake 750 mL       Output    Est. Blood Loss 5 mL    Total Output 5 mL       Net    Net Volume 745 mL          Specimen: No specimens collected     Staff:   Circulator: Alba  Scrub Person: Ana Munoz Circulator: Palak Munoz Scrub: Martina         Drains and/or Catheters:   Closed/Suction Drain 1 Right;Inferior Abdomen Bulb 10 Fr. (Active)       [REMOVED] Urethral Catheter Non-latex 16 Fr. (Removed)       Tourniquet Times:         Implants:  Implants       Type Name Action Serial No.       18CM TITAN INFLATABLE PENILE PROSTHESIS, ZERO DEGREE CYLINDER/PUMP SET INFRAPUBIC Implanted       ASSEMBLY KIT  Implanted                Findings: Coloplast Titan IPP, size 18 cm with no rear-tip extenders.  Classic pump.  125 mL reservoir in space of Retzius (left).  150 mL in the system.    Indications: Noah Allen is an 54 y.o. male who is having surgery for Other male erectile dysfunction [N52.8].  He has tried and failed oral therapy without success and now elects to undergo inflatable penile implant.    The patient was seen in the preoperative area. The risks, benefits, complications, treatment options, non-operative alternatives, expected recovery and outcomes were discussed with the patient. The possibilities of reaction to medication, pulmonary aspiration, injury to surrounding structures, bleeding, recurrent infection, the need for additional procedures, failure to diagnose a condition, and creating a complication requiring transfusion or operation were discussed with the patient. The patient concurred with the proposed plan, giving informed consent.  The site of surgery was properly noted/marked if necessary per policy. The patient has been actively warmed in preoperative area. Preoperative antibiotics have been ordered and given within 1 hours of incision. Venous thrombosis prophylaxis have been ordered including bilateral sequential compression devices    Procedure Details:   Prior to the operating room, informed consent was obtained for the procedure as described above.  The patient was then taken to the operating suite where a final time out was taken to confirm the patient and the procedure.  General anesthesia was administered and the patient was positioned in the supine position.  He was then sterilely prepped and draped in the standard fashion for this procedure.  The prep included scrub with chlorhexidine followed by 2 separate chloroprep sticks. Pt was prepped and draped in sterile fashion. Ioban placed over the field and penis and scrotum brought through the ioban, A aguiar catheter was then placed and secured with  the catheter wrapped in an antibiotic soaked lap pad.  The bladder was drained      Bilateral pudendal nerve block was performed with 0.5% marcaine and dexamethasone (10cc on each side and 10cc along left inguinal canal)    An artificial erection was performed with 20cc on 0.25% marcaine and 30cc of saline.  No significant curvature or deformity.      The procedure was then begun. A 3 cm infrapubic incision was made 1 fingerbreadth over the peno-pubic junction.  The dissection was then carried down through the subcutaneous tissue.  The corpora cavernosa were exposed bilaterally with blunt dissection.  2-0 monocryl sutures were then placed into the corpora bilaterally to serve as traction sutures and for closure at the end of the procedure.  A proximal corporotomy was then made on the left with a scalpel down to the smooth muscle of the corpora cavernosa.  A curved hemostat was then passed to ensure access to the appropriate space.  The space was then dilated with Wu dilators up to 11 proximally and distally with care to maintain correct position to avoid crossover.  The ScribeStormugh device was then used to measure the internal length of the corpora which measured 18cm.  The right corporotomy was then made and dilated in the same fashion.  The right corpora was measured at 18cm.  The corpora were then copiously irrigated with antibiotics irrigation.  The decision was then made to use a 18cm Coloplast Titan IPP with classic pump, no rear tips, and 125 mL reservoir.   While the implant was prepped, we began placement of the reservoir.  This was performed by passing a finger up to the left inguinal ring from the incision.  The fascia deep to this level just over the pubic bone was then dissected bluntly to allow access to the space of retzius.  The reservoir was then passed into this space and filled with normal saline without any evidence of back pressure.  The reservoir was then secured with a rubber shod clamp.  The  implant was then placed utilizing a unruly needle and the furlough.  The furlough was then passed through the right corporotomy up to the mid glans.  The needle was passed through the glans and secured with a curved clamp.  The implant cylinder was then placed without difficulty.  The left cylinder was then placed in the same fashion.  The wound and implant continued to be irrigated with antibiotic irrigation throughout the procedure.                          The implant was test inflated with the surrogate reservoir with good cosmetic result and symmetric tips in the glans.  The implant was deflated and the corporotomies were closed with the pre-placed sutures and additional interrupted sutures as necessary.  A dartos pocket was made in the midline of the scrotum for the pump using a nasal speculum.  The tubing was then connected with the accessory kit and excess tubing discarded.  A 10-Fr MICHI drain was then placed through a stab incision on the right side of the groin, it was secured with a 3-0 nylon. The dartos was re-approximated with running and interrupted 3-0 vicryl sutures.  The skin was then closed with running 4-0 caprosyn sutures. Skin glue was applied to the incision.  Fluffs and scrotal support were placed.      Pt was then awakened and transferred to the PACU in stable condition     Dispo: Patient to be discharged home once he meets PACU requirements. He is to follow-up in 2-3 weeks for wound check.       Complications:  None; patient tolerated the procedure well.    Disposition: PACU - hemodynamically stable.  Condition: stable         Additional Details: n/a    Attending Attestation:     Nitish Mcdonald  Phone Number: [phone]

## 2024-06-19 NOTE — DISCHARGE INSTRUCTIONS
DEPARTMENT OF Urology  DISCHARGE INSTRUCTIONS Penile Prosthesis Placement  Outpatient Surgery    C O N F I D E N T I A L   I N F O R M A T I O N    Noah Allen    Call [phone] during regular daytime business hours (8:00 am - 5:00 pm) and after 5:00 pm and ask for the Urology resident with any questions or concerns.    If it is a life-threatening situation, proceed to the nearest emergency department.        Thank you for the opportunity to care for you today.  Your health and healing are very important to us.  We hope we made you feel as comfortable as possible and are committed to your recovery and continued well-being.      The following is a brief overview of your penile prosthesis procedure. Some of the information contained on this summary may be confidential.  This information should be kept in your records and should be shared with your regular doctor.    Physicians:   Dr. Nitish Mcdonald MD     Procedure performed: insertion of penile prosthesis    What to Expect During your Recovery and Home Care  Anesthesia Side Effects   You received anesthesia today.  You may feel sleepy, tired, or have a sore throat.   You may also feel drowsiness, dizziness, or inability to think clearly.  For your safety, do not drive, drink alcoholic beverages, take any unprescribed medication or make any important decisions for 24 hours.  A responsible adult should be with you for 24 hours.       Activity and Recovery    Go home and rest. No strenuous activity and no heavy lifting over 10 lbs.     Pain Control  Unfortunately, you may experience pain after your procedure.  Adequate management can include alternative measures to help ease your pain and can include ice packs, scrotal support, and over the counter Tylenol. Oxycodone may also be prescribe for breakthrough pain. Do not take more than 4,000 mg of Tylenol in a 24-hour period.     Oxycodone is a narcotic medication, which can impair judgment, slow reaction  times, and cause constipation. Take Miralax, Colace, or other stool softeners for constipation. Do not drive or operate machinery while taking narcotic medications.    Nausea/Vomiting   Clear liquids are best tolerated at first. Start slow, advance your diet as tolerated to normal foods. Avoid spicy, greasy, heavy foods at first. Also, you may feel nauseous or like you need to vomit if you take any type of medication on an empty stomach.  Call your physician if you are unable to eat or drink and have persistent vomiting.          Signs of Bleeding   Minor bleeding or drainage may occur from the surgical site; however, excessive or consistent bleeding should be reported to your surgeon. Excessive bleeding is defined as blood that is dripping from wound, soaking you bandages, and is ketchup colored, thick with possible blood clots.  Consistent is defined as bleeding that does not stop.    Treatment/wound care:   Keep area(s) clean and dry. You can wear gauze dressing on top of the incisions, so the scrotal support does not irritate the incisions. It is incredibly important that you keep the scrotum well supported for the first two weeks after the surgery. Continue to wear the scrotal support/jock straps/tight fitting underwear.  If you have a drain in place, we will schedule an appointment for the drain to be removed.  The device is purposefully left partially inflated (approximately 30 to 40 percent). This will be deflated at the two week visit.  Pull the scrotal pump straight down towards the ground in the shower when the scrotal skin is supple. You should also do this every time you go to the bathroom This will allow the pump to heal in a nice dependent position.  It is okay to shower 48 hours after time of surgery.  Do not submerge incisions in water (tub bath, swimming) until incisions have completely healed.  Do not scrub wound(s), pat dry.  Clean with mild soap. Do not use oils or lotions on  incisions.    Please visually inspect your wound(s) at least once daily.  If the wound(s) are in a difficult to see location, please use a mirror or have someone else assist with visual inspection.    Signs of Infection  Signs of infection can include fever, excessive swelling, heat, drainage, redness, or severe pain.  If you see any of these occur, please contact [phone]. Any fever higher than 100.4, especially if associated with an ill feeling, abdominal pain, chills, or nausea should be reported to your surgeon.        Additional Instructions:   No sex until discussed further at your follow up appointment. Your device has been left partially inflated, do not deflate, or inflate further. You should wear tight fitting underwear for the next six weeks. At your six week follow up you will be taught how to use your device.

## 2024-06-19 NOTE — ANESTHESIA PROCEDURE NOTES
Airway  Date/Time: 6/19/2024 11:37 AM  Urgency: elective    Airway not difficult    Staffing  Performed: CRNA   Authorized by: Marcelina Almanzar MD    Performed by: VU Ascencio-KEEGAN  Patient location during procedure: OR    Indications and Patient Condition  Indications for airway management: anesthesia  Spontaneous ventilation: present  Sedation level: deep  Preoxygenated: yes  Patient position: sniffing  Mask difficulty assessment: 0 - not attempted  Planned trial extubation    Final Airway Details  Final airway type: supraglottic airway      Successful airway: Supraglottic airway: igel.  Size 5     Number of attempts at approach: 1

## 2024-06-19 NOTE — PERIOPERATIVE NURSING NOTE
Discharge instructions reviewed with pt, MICHI drain teaching and anesthesia after care reviewed with pt daughter. NO details were given about what specific surgery patient had per request of pt. IV removed. Pt able to urinate stable for d.c

## 2024-06-20 NOTE — ANESTHESIA POSTPROCEDURE EVALUATION
Patient: Noah Allen    Procedure Summary       Date: 06/19/24 Room / Location: [facility] A OR 02 / Virtual U A OR    Anesthesia Start: 1119 Anesthesia Stop: 1255    Procedure: Penile Prosthesis Insertion (Penis) Diagnosis:       Other male erectile dysfunction      (Other male erectile dysfunction [N52.8])    Surgeons: Nitish Mcdonald MD Responsible Provider: Marcelina Almanzar MD    Anesthesia Type: general ASA Status: 3            Anesthesia Type: general    Vitals Value Taken Time   /61 06/19/24 1403   Temp 36.2 °C (97.2 °F) 06/19/24 1400   Pulse 79 06/19/24 1413   Resp 16 06/19/24 1400   SpO2 95 % 06/19/24 1413   Vitals shown include unfiled device data.    Anesthesia Post Evaluation    Patient location during evaluation: PACU  Patient participation: complete - patient participated  Level of consciousness: awake and alert  Pain management: adequate  Airway patency: patent  Cardiovascular status: acceptable  Respiratory status: acceptable  Hydration status: acceptable  Postoperative Nausea and Vomiting: none        No notable events documented.

## 2024-06-21 ENCOUNTER — OFFICE VISIT (OUTPATIENT)
Dept: UROLOGY | Facility: HOSPITAL | Age: 54
End: 2024-06-21
Payer: MEDICARE

## 2024-06-21 DIAGNOSIS — E11.9 TYPE 2 DIABETES MELLITUS WITHOUT COMPLICATIONS (MULTI): ICD-10-CM

## 2024-06-21 DIAGNOSIS — Z96.0 S/P INSERTION OF PENILE IMPLANT: Primary | ICD-10-CM

## 2024-06-21 PROCEDURE — 99024 POSTOP FOLLOW-UP VISIT: CPT | Performed by: UROLOGY

## 2024-06-21 PROCEDURE — 4010F ACE/ARB THERAPY RXD/TAKEN: CPT | Performed by: UROLOGY

## 2024-06-21 RX ORDER — METFORMIN HYDROCHLORIDE 500 MG/1
500 TABLET, EXTENDED RELEASE ORAL 2 TIMES DAILY
Qty: 180 TABLET | Refills: 0 | Status: SHIPPED | OUTPATIENT
Start: 2024-06-21

## 2024-06-21 NOTE — PROGRESS NOTES
POV    HISTORY OF PRESENT ILLNESS:   Noah Allen is a 54 y.o. male who is being seen today for drain removal    Pt had IPP placed on 6/19/24.  Coloplast Titan IPP IP approach, size 18 cm with no rear-tip extenders. Classic pump. 125 mL reservoir in space of Retzius (left). 100 mL in the system. discharged home with drain    Doing well.  No significant pain, no f/c. Scant output in drain    PAST MEDICAL HISTORY:  Past Medical History:   Diagnosis Date    Alcohol abuse     quit 12/1/22    Anemia, unspecified 06/29/2020    Mild anemia, H/H= 14/43.8 on 9/28/23    Salinas's esophagus     Buerger's disease (CMS-HCC)     managed by PCP Dr. Jain: on Coumadin, stoppage requested    Cervical radiculopathy     Cervical spondylosis     Chronic pain     CKD (chronic kidney disease)     BUN/CR= 9/0.89 with eGFR >90 on 11/21/23    Contusion of left front wall of thorax, initial encounter 10/23/2021    Contusion of left chest wall, initial encounter    Depression     Disease of spinal cord, unspecified (Multi) 04/02/2018    Cervical myelopathy    DM (diabetes mellitus) (Multi)     A1C= 5.7% on 11/21/23    ED (erectile dysfunction)     Essential (primary) hypertension 09/25/2018    HTN (hypertension), benign    GERD (gastroesophageal reflux disease)     Hematuria     following with urology    HLD (hyperlipidemia)     Hyponatremia     SJ=003, 11/21/23    Lightheadedness     Long term (current) use of anticoagulants 04/04/2018    Chronic anticoagulation    Lumbar radiculopathy     Numbness     SAAD (obstructive sleep apnea)     Palpitations     Phimosis of penis     PVD (peripheral vascular disease) (CMS-[facility])     Vitamin D deficiency        PAST SURGICAL HISTORY:  Past Surgical History:   Procedure Laterality Date    CERVICAL FUSION      COLONOSCOPY      KNEE SURGERY  06/02/2015    Knee Surgery    OTHER SURGICAL HISTORY  01/26/2018    Anterior Spinal Diskectomy, Osteophytectomy Cerv Interspace    UPPER GASTROINTESTINAL  ENDOSCOPY          ALLERGIES:   Allergies   Allergen Reactions    Penicillins Anaphylaxis    Sulfa (Sulfonamide Antibiotics) Anaphylaxis    Lisinopril Cough     cough        MEDICATIONS:   Current Outpatient Medications   Medication Instructions    acetaminophen (TYLENOL) 1,000 mg, oral, Every 6 hours PRN    amLODIPine (NORVASC) 10 mg, oral, Daily    aspirin 81 mg, oral, Daily    atorvastatin (LIPITOR) 40 mg, oral, Daily    baclofen (Lioresal) 10 mg tablet 1 tablet, oral, 2 times daily PRN    chlorhexidine (Peridex) 0.12 % solution Swish for 30 seconds and spit 15mL of solution the night before and morning of surgery    cholecalciferol (Vitamin D-3) 50 mcg (2,000 unit) capsule 1 tablet, oral, Daily    ciclopirox (Penlac) 8 % solution Topical, Daily, Remove weekly    ciprofloxacin (CIPRO) 500 mg, oral, 2 times daily    docusate sodium (COLACE) 100 mg, oral, 2 times daily    DULoxetine (CYMBALTA) 60 mg, oral, 2 times daily    enoxaparin (LOVENOX) 80 mg, subcutaneous, Daily    ferrous sulfate 65 mg, oral, Daily with breakfast    gabapentin (Neurontin) 300 mg capsule 3 capsules, oral, 2 times daily, Take as directed    ibuprofen 600 mg, oral, Every 6 hours PRN    losartan (COZAAR) 100 mg, oral, Daily    losartan (COZAAR) 100 mg, oral, Daily    metFORMIN XR (GLUCOPHAGE-XR) 500 mg, oral, 2 times daily    omeprazole (PRILOSEC) 40 mg, oral, 2 times daily    oxyCODONE (ROXICODONE) 5 mg, oral, Every 6 hours PRN    warfarin (COUMADIN) 7.5 mg, oral, Nightly        PHYSICAL EXAM:  There were no vitals taken for this visit.  Constitutional: Patient appears well-developed and well-nourished. No distress.    Pulmonary/Chest: Effort normal. No respiratory distress.   Abdominal: Soft, ND NT  : Tips in good position, minimal swelling, some bruing of scrotum, pump midline and dependent.  Drain removed  Musculoskeletal: Normal range of motion.    Neurological: Alert and oriented to person, place, and time.  Psychiatric: Normal mood  and affect. Behavior is normal. Thought content normal.      Labs  Lab Results   Component Value Date    PSA 0.64 11/13/2019     Lab Results   Component Value Date    GFRMALE >90 09/28/2023     Lab Results   Component Value Date    CREATININE 1.42 (H) 06/12/2024    CREATININE 1.35 (H) 06/12/2024     Lab Results   Component Value Date    CHOL 174 04/18/2023     Lab Results   Component Value Date    HDL 41.3 04/18/2023     Lab Results   Component Value Date    CHHDL 4.2 04/18/2023     Lab Results   Component Value Date    LDLF 99 04/18/2023     Lab Results   Component Value Date    VLDL 34 04/18/2023     Lab Results   Component Value Date    TRIG 169 (H) 04/18/2023     Lab Results   Component Value Date    HGBA1C 5.7 (H) 11/21/2023       Lab Results   Component Value Date    HCT 40.5 (L) 06/12/2024       Imaging    Procedures      Assessment:      1. S/P insertion of penile implant          Noah Allen is a 54 y.o. male here for POV    Doing well    Drain removed     Plan:   1)  Local wound care  2) Can restart coumadin  3) Warning signs discussed    FU in 2wks, sooner if needed

## 2024-06-24 ENCOUNTER — APPOINTMENT (OUTPATIENT)
Dept: PHARMACY | Facility: CLINIC | Age: 54
End: 2024-06-24
Payer: MEDICARE

## 2024-06-24 ENCOUNTER — EVALUATION (OUTPATIENT)
Dept: PHYSICAL THERAPY | Facility: CLINIC | Age: 54
End: 2024-06-24
Payer: MEDICARE

## 2024-06-24 ENCOUNTER — ANTICOAGULATION - WARFARIN VISIT (OUTPATIENT)
Dept: PHARMACY | Facility: CLINIC | Age: 54
End: 2024-06-24
Payer: MEDICARE

## 2024-06-24 DIAGNOSIS — I73.1 THROMBOANGIITIS OBLITERANS (BUERGER'S DISEASE) (CMS-HCC): Primary | ICD-10-CM

## 2024-06-24 DIAGNOSIS — M54.42 LEFT-SIDED LOW BACK PAIN WITH LEFT-SIDED SCIATICA, UNSPECIFIED CHRONICITY: ICD-10-CM

## 2024-06-24 LAB
POC INR: 1.7
POC PROTHROMBIN TIME: NORMAL

## 2024-06-24 PROCEDURE — 85610 PROTHROMBIN TIME: CPT | Mod: QW | Performed by: PHARMACIST

## 2024-06-24 PROCEDURE — 97161 PT EVAL LOW COMPLEX 20 MIN: CPT | Mod: GP

## 2024-06-24 PROCEDURE — 97110 THERAPEUTIC EXERCISES: CPT | Mod: GP

## 2024-06-24 PROCEDURE — 99212 OFFICE O/P EST SF 10 MIN: CPT | Performed by: PHARMACIST

## 2024-06-24 NOTE — PROGRESS NOTES
Pt had procedure 6/19.  Here today for inr check to see if he can stop taking lovenox injections.  Pt was told to re-start warfarin the day after procedure.    Pt says he slept a lot and accidentally took 2 doses of warfarin in 1 day  Verified current dose with pt.    No new meds or med changes since last visit.  Pt denies unusual bleed/bruise.  No upcoming procedures.  Inr = 1.7 - will have pt use his last 2 doses of lovenox and  boost   11.25 mg today (Mon)  Continue same dose and check again in 2 weeks.

## 2024-06-24 NOTE — PROGRESS NOTES
Physical Therapy Evaluation    Patient Name Noah Allen  MRN: [id number]  Today's Date: 6/24/2024  Time Calculation  Start Time: 1340  Stop Time: 1415  Time Calculation (min): 35 min    Insurance: Payor: [employer] / Plan: [employer] MEDICARE ASSURE / Product Type: *No Product type* / VISITS ARE MED NEC NO AUTH NEEDED PAYS %   -authorization required: no  -number of visits authorized:  -Authorization/certification dates:  Next MD appointment: 7/12/24  Visit # 1    Problem List Items Addressed This Visit             ICD-10-CM    Left-sided low back pain with left-sided sciatica M54.42    Relevant Orders    Follow Up In Physical Therapy       Onset Date: 6/1/2022  Referring Provider: YAMILETH King   PT Orders: eval and treat      Assessment:    Impression/Clinical Presentation:  Noah Allen  is a 54 y.o. old patient who participated in a physical therapy evaluation today due to L LS/LLE pain.     Noah presents with signs and symptoms consistent with lumbar spondylosis. Noah's impairments include: balance deficits, gait deficits, postural deficits, pain, decreased strength, decreased range of motion, decreased sensation, and decreased functional mobility.       Due to these impairments, he has the following functional limitations and participation restrictions:  increased fall risk, difficulty with ambulation, difficulty with stair negotiation, and difficulty with completing/performing some ADLs/IADLs.     Skilled PT   Skilled physical therapy services are appropriate and beneficial in order to achieve measurable and meaningful change in the objective tests and measures. Utilization of skilled physical therapy services will aid in advancing his functional status and attaining his therapy-related goals.     Problem List:  -activity/functional limitations  -Pain L LS/LLE  -decreased  ROM  -decreased strength   -decreased flexibility  -posture  awareness  -decreased knowledge of HEP  -balance    Goals:  STG 2 wks  Compliant with HEP  Dec pain 10%    LTG by discharge  I HEP  Improve functional outcome score to indicate improved functional mobility  Dec pain L LS/LLE 50% on pain scale with dec pain with walking/standing  Inc posture awareness  Inc lumbar ROM WFL with improved LE dsg  Inc LE flexibility WFL  Inc core/hip flexor strength 5/5 with improved walking tolerance  Improve SLS > 15 sec with reports of no falls    Rehab Potential:  good    Clinical Presentation:    stable/and/or uncomplicated characteristics                                            Level of Complexity: low    Plan:    Therapeutic exercise, Manual therapy, Home program instruction and progression, Neuromuscular re-education, Therapeutic activities, Self care and home management, Instruction in activity modification, Electrical stimulation, and Cryotherapy    Nustep for soft tissue warmup, posture instruction/exercises, lumbar ROM, LE flexibility, DLS, modalities prn, balance activities    1 x week  until goals met or maximum rehab potential met  Pt is currently scheduled for 6 weeks    Plan of care was designed with input and agreement by the patient    Subjective:    Current Episode of Functional Impairment and/or Pain :  Chief complaint/HPI: onset of lumbar spine problems about 2 yrs ago-insidious onset  Pain and gets pins/needles in LLE with prolonged standing  Hx of cervical spine surgery x 3 including fusion  Active with PT for cervical spine for last 4 yrs-stretching/STM/Estim    Pain  Pain assessment 0-10  Pain score: 5  Pain location: L LS/LLE  Type: sharp    Exacerbating Factors: standing/walking  Relieving Factors: sitting    Current Medical management:     PMHx: Reviewed medical history form with patient and medical screening assessed.   cervical fusion (ACDF C4 - 7, PCDF C4 - 7). , DM, HTN, knee surgery 1993     Medications for pain: oxycodone     Diagnostic Tests:  xrays-Severe facet disease lower lumbar spine. Moderate to severe  multilevel spondylosis    Precautions: mod fall risk  Some coordination issues from cervical spine problem  2-3 falls in last 6 months    Functional Limitations: Dressing, Walking, Stair negotiation, and Standing  LE dsg, standing in [employer], sleeps in recliner    Home Living Situation: lives alone  2 story home    Prior Level of Function walking tolerance better 2 yrs ago    Patient Stated Goal For Therapy resolve back pain    Occupation: disability    Objective:    Ortho:  Lumbar ROM  FB: 50%  BB: 50%  RSB: 25%  LSB: 25%    Flexibility   Hamstrings B 60  piriformis: B mod tight    Strength  BLE 5/5 except hip flex 4/5    abdominals: 4-/5  back extensors bridges 25% with moderate instability noted    Special Tests:     SLR: B-    posture: RS/FH, B winged scapula, scoliosis, dec lumbar lordosis    palpation: no POP    Gait:  Step to pattern descending intermittently  Intermittent use of cane  I amb w/o device today    Balance SLS 5 sec w/o UE support    Outcome Measures:  Other Measures  Oswestry Disablity Index (FARNAZ): 34     Treatment:    PT Evaluation Time Entry  PT Evaluation (Low) Time Entry: 25  minutes    Therapeutic Exercise:                             10 minutes  SKC 10x  LTR 10x  Supine hamstring stretch 10 sec 5x  Supine piriformis stretch 10 sec 5x                                     Response to treatment: improved knowledge and understanding of condition  Noah verbalized understanding and is in agreement with all goals and plan of care.  Noah left session with all questions answered and no increase in symptoms.      Education: Educated on relevant anatomy and expected plan of care  Instructed in initial HEP including reasoning of given exercises and issued written instructions

## 2024-07-02 ENCOUNTER — APPOINTMENT (OUTPATIENT)
Dept: UROLOGY | Facility: HOSPITAL | Age: 54
End: 2024-07-02
Payer: MEDICARE

## 2024-07-02 DIAGNOSIS — Z96.0 S/P INSERTION OF PENILE IMPLANT: Primary | ICD-10-CM

## 2024-07-02 PROCEDURE — 99024 POSTOP FOLLOW-UP VISIT: CPT | Performed by: UROLOGY

## 2024-07-02 PROCEDURE — 4010F ACE/ARB THERAPY RXD/TAKEN: CPT | Performed by: UROLOGY

## 2024-07-02 NOTE — PROGRESS NOTES
POV    HISTORY OF PRESENT ILLNESS:   Noah Allen is a 54 y.o. male who is being seen today for fuv    Pt had IPP placed on 6/19/24.  Coloplast Titan IPP IP approach, size 18 cm with no rear-tip extenders. Classic pump. 125 mL reservoir in space of Retzius (left). 100 mL in the system. discharged home with drain    6/21/24 - Doing well.  No significant pain, no f/c. Scant output in drain. Drain removed    7/2/24 - Doing well    PAST MEDICAL HISTORY:  Past Medical History:   Diagnosis Date    Alcohol abuse     quit 12/1/22    Anemia, unspecified 06/29/2020    Mild anemia, H/H= 14/43.8 on 9/28/23    Salinas's esophagus     Buerger's disease (CMS-HCC)     managed by PCP Dr. Jain: on Coumadin, stoppage requested    Cervical radiculopathy     Cervical spondylosis     Chronic pain     CKD (chronic kidney disease)     BUN/CR= 9/0.89 with eGFR >90 on 11/21/23    Contusion of left front wall of thorax, initial encounter 10/23/2021    Contusion of left chest wall, initial encounter    Depression     Disease of spinal cord, unspecified (Multi) 04/02/2018    Cervical myelopathy    DM (diabetes mellitus) (Multi)     A1C= 5.7% on 11/21/23    ED (erectile dysfunction)     Essential (primary) hypertension 09/25/2018    HTN (hypertension), benign    GERD (gastroesophageal reflux disease)     Hematuria     following with urology    HLD (hyperlipidemia)     Hyponatremia     PM=373, 11/21/23    Lightheadedness     Long term (current) use of anticoagulants 04/04/2018    Chronic anticoagulation    Lumbar radiculopathy     Numbness     SAAD (obstructive sleep apnea)     Palpitations     Phimosis of penis     PVD (peripheral vascular disease) (CMS-[facility])     Vitamin D deficiency        PAST SURGICAL HISTORY:  Past Surgical History:   Procedure Laterality Date    CERVICAL FUSION      COLONOSCOPY      KNEE SURGERY  06/02/2015    Knee Surgery    OTHER SURGICAL HISTORY  01/26/2018    Anterior Spinal Diskectomy, Osteophytectomy Cerv  Interspace    UPPER GASTROINTESTINAL ENDOSCOPY          ALLERGIES:   Allergies   Allergen Reactions    Penicillins Anaphylaxis    Sulfa (Sulfonamide Antibiotics) Anaphylaxis    Lisinopril Cough     cough        MEDICATIONS:   Current Outpatient Medications   Medication Instructions    acetaminophen (TYLENOL) 1,000 mg, oral, Every 6 hours PRN    amLODIPine (NORVASC) 10 mg, oral, Daily    aspirin 81 mg, oral, Daily    atorvastatin (LIPITOR) 40 mg, oral, Daily    baclofen (Lioresal) 10 mg tablet 1 tablet, oral, 2 times daily PRN    chlorhexidine (Peridex) 0.12 % solution Swish for 30 seconds and spit 15mL of solution the night before and morning of surgery    cholecalciferol (Vitamin D-3) 50 mcg (2,000 unit) capsule 1 tablet, oral, Daily    ciclopirox (Penlac) 8 % solution Topical, Daily, Remove weekly    docusate sodium (COLACE) 100 mg, oral, 2 times daily    DULoxetine (CYMBALTA) 60 mg, oral, 2 times daily    enoxaparin (LOVENOX) 80 mg, subcutaneous, Daily    ferrous sulfate 65 mg, oral, Daily with breakfast    gabapentin (Neurontin) 300 mg capsule 3 capsules, oral, 2 times daily, Take as directed    ibuprofen 600 mg, oral, Every 6 hours PRN    losartan (COZAAR) 100 mg, oral, Daily    losartan (COZAAR) 100 mg, oral, Daily    metFORMIN XR (GLUCOPHAGE-XR) 500 mg, oral, 2 times daily    omeprazole (PRILOSEC) 40 mg, oral, 2 times daily    oxyCODONE (ROXICODONE) 5 mg, oral, Every 6 hours PRN    warfarin (COUMADIN) 7.5 mg, oral, Nightly        PHYSICAL EXAM:  There were no vitals taken for this visit.  Constitutional: Patient appears well-developed and well-nourished. No distress.    Pulmonary/Chest: Effort normal. No respiratory distress.   Abdominal: Soft, ND NT  : Tips in good position, minimal swelling, some bruing of scrotum, pump midline and dependent.  Cycles well. Incision well healed  Musculoskeletal: Normal range of motion.    Neurological: Alert and oriented to person, place, and time.  Psychiatric: Normal  mood and affect. Behavior is normal. Thought content normal.      Labs  Lab Results   Component Value Date    PSA 0.64 11/13/2019     Lab Results   Component Value Date    GFRMALE >90 09/28/2023     Lab Results   Component Value Date    CREATININE 1.42 (H) 06/12/2024    CREATININE 1.35 (H) 06/12/2024     Lab Results   Component Value Date    CHOL 174 04/18/2023     Lab Results   Component Value Date    HDL 41.3 04/18/2023     Lab Results   Component Value Date    CHHDL 4.2 04/18/2023     Lab Results   Component Value Date    LDLF 99 04/18/2023     Lab Results   Component Value Date    VLDL 34 04/18/2023     Lab Results   Component Value Date    TRIG 169 (H) 04/18/2023     Lab Results   Component Value Date    HGBA1C 5.7 (H) 11/21/2023       Lab Results   Component Value Date    HCT 40.5 (L) 06/12/2024       Imaging    Procedures      Assessment:      1. S/P insertion of penile implant          Noah Allen is a 54 y.o. male here for POV    Doing well       Plan:   1) Local wound care  2) Start cycling  3) Warning signs discussed    FU in4wks, sooner if needed

## 2024-07-10 ENCOUNTER — ANTICOAGULATION - WARFARIN VISIT (OUTPATIENT)
Dept: PHARMACY | Facility: CLINIC | Age: 54
End: 2024-07-10
Payer: MEDICARE

## 2024-07-10 ENCOUNTER — APPOINTMENT (OUTPATIENT)
Dept: PODIATRY | Facility: CLINIC | Age: 54
End: 2024-07-10
Payer: MEDICARE

## 2024-07-10 ENCOUNTER — TREATMENT (OUTPATIENT)
Dept: PHYSICAL THERAPY | Facility: CLINIC | Age: 54
End: 2024-07-10
Payer: MEDICARE

## 2024-07-10 ENCOUNTER — APPOINTMENT (OUTPATIENT)
Dept: NEUROSURGERY | Facility: CLINIC | Age: 54
End: 2024-07-10
Payer: MEDICARE

## 2024-07-10 DIAGNOSIS — M54.42 LEFT-SIDED LOW BACK PAIN WITH LEFT-SIDED SCIATICA, UNSPECIFIED CHRONICITY: ICD-10-CM

## 2024-07-10 DIAGNOSIS — M79.671 FOOT PAIN, BILATERAL: ICD-10-CM

## 2024-07-10 DIAGNOSIS — I73.1 THROMBOANGIITIS OBLITERANS (BUERGER'S DISEASE) (CMS-HCC): Primary | ICD-10-CM

## 2024-07-10 DIAGNOSIS — B35.1 ONYCHOMYCOSIS: Primary | ICD-10-CM

## 2024-07-10 DIAGNOSIS — M79.672 FOOT PAIN, BILATERAL: ICD-10-CM

## 2024-07-10 LAB
POC INR: 3.5
POC PROTHROMBIN TIME: NORMAL

## 2024-07-10 PROCEDURE — 85610 PROTHROMBIN TIME: CPT | Mod: QW

## 2024-07-10 PROCEDURE — 4010F ACE/ARB THERAPY RXD/TAKEN: CPT | Performed by: PODIATRIST

## 2024-07-10 PROCEDURE — 99213 OFFICE O/P EST LOW 20 MIN: CPT | Performed by: PODIATRIST

## 2024-07-10 PROCEDURE — 97110 THERAPEUTIC EXERCISES: CPT | Mod: GP

## 2024-07-10 PROCEDURE — 97112 NEUROMUSCULAR REEDUCATION: CPT | Mod: GP

## 2024-07-10 PROCEDURE — 99212 OFFICE O/P EST SF 10 MIN: CPT

## 2024-07-10 PROCEDURE — 1036F TOBACCO NON-USER: CPT | Performed by: PODIATRIST

## 2024-07-10 NOTE — PROGRESS NOTES
No bleeding or unusual bruising.  Medications and doses verified.  No scheduled procedures at this time.  INR=3.5   No antibiotics.  No OCT  Diet has been the same.  No alcohol.  Plan: Hold dose today then continue same weekly dose.  Follow up INR check in 3 weeks.

## 2024-07-10 NOTE — PROGRESS NOTES
Chief Complaint   Patient presents with    fungal nail     Patient is here today for a follow up on fungal nails 1-5 JERRICA      HPI: Patient is here for follow-up topical Penlac for fungal nail treatments.      Phyiscal Exam  Patient alert, oriented, no acute distress    VASC: +2/4 pedal pulses B/L.  CFT brisk all digits.  Skin temperature is warm to cool proximal distal bilateral foot.  No ischemic changes to the toes noted bilaterally.    NEURO: Vibratory diminished 1st MPJ B/L.  Light touch intact B/L.   5.07 [place]-Vanessa monofilament intact B/L.    DERM:Nails 1,2,3,5 L and 1,2,4,5 R are thickened, discolored, crumbly, painful and elongated with subungual debris. Pain on palpation to the nails. No cellulitis noted.   Clear base is noted on hallux B/L.    MUSCULOSKEL: +5/5 muscle strength B/L.    No ROM 1st MPJ R, limited ROM 1st MPJ L  Large exostosis noted 1st MPJ R        Assessment and Plan  #1  Onychomycosis  Debrided all nails without complications  Continue with Penlac apply daily  Follow-up 2-3 months

## 2024-07-10 NOTE — PROGRESS NOTES
Physical Therapy Treatment Note      Patient Name Noah Allen  MRN: [id number]  Today's Date: 7/10/2024  Time Calculation  Start Time: 1510  Stop Time: 1550  Time Calculation (min): 40 min    Insurance: Payor: [employer] MEDICARE / Plan: [employer] MEDICARE ASSURE / Product Type: *No Product type* / VISITS ARE MED NEC NO AUTH NEEDED PAYS %   Visit #: 2  Date of Onset:    6/1/2022   -authorization required: no    General:  Next MD appt: 7/12/24   Soumya Cates APRN-CNP     Problem List Items Addressed This Visit             ICD-10-CM    Left-sided low back pain with left-sided sciatica M54.42       Assessment:  skilled intervention: exercise progression for lumbar stabilization/balance    Patient would continue to benefit from skilled PT to address remaining functional, objective and subjective deficits to allow them to return to full independence with ADLs     Progressed with supine stabilization and balance activities     Plan:  Standing stabilization    Precautions: mod fall risk  Some coordination issues from cervical spine problem  2-3 falls in last 6 months    Subjective:  General:  Arrives with minimal pain    Functional Progress:  Had tingling while doing dishes the other day  No falls    Pain  Pain assessment 0-10  Pain score: 1-2  Pain location:  L LS    Compliant with HEP:  yes    Understanding of HEP: WNL    Objective:  Therapeutic Exercise  25 minutes  see below  **indicates new exercises or progression  NP=not performed    Neuromuscular Re-education:  15 minutes  See below  **indicates new exercises or progression  NP=not performed        Other     Exercise Log:  Nustep L3 5' **  SKC 10x  LTR 10x  Supine hamstring stretch 10 sec 5x  Supine piriformis stretch 10 sec 5x    Pelvic tilts 10x **  Adductor sets 10x **  Hooklying hip abduction blue 10x **  DLS SLR 10x **    SLS 10 sec 5x BLE airex  **  Airex calf raises 10x2**  Airex balance  beam side stepping 5x **  Airex balance beam heel/toe walk 5x **                                   Education:  Instructed in progression of exercises and issued written instructions and blue tband

## 2024-07-12 ENCOUNTER — APPOINTMENT (OUTPATIENT)
Dept: NEUROSURGERY | Facility: CLINIC | Age: 54
End: 2024-07-12
Payer: MEDICARE

## 2024-07-12 VITALS
SYSTOLIC BLOOD PRESSURE: 130 MMHG | WEIGHT: 175 LBS | BODY MASS INDEX: 23.7 KG/M2 | TEMPERATURE: 97.8 F | HEART RATE: 73 BPM | DIASTOLIC BLOOD PRESSURE: 72 MMHG | HEIGHT: 72 IN

## 2024-07-12 DIAGNOSIS — M54.42 LEFT-SIDED LOW BACK PAIN WITH LEFT-SIDED SCIATICA, UNSPECIFIED CHRONICITY: Primary | ICD-10-CM

## 2024-07-12 PROCEDURE — 3078F DIAST BP <80 MM HG: CPT | Performed by: NURSE PRACTITIONER

## 2024-07-12 PROCEDURE — 1036F TOBACCO NON-USER: CPT | Performed by: NURSE PRACTITIONER

## 2024-07-12 PROCEDURE — 4010F ACE/ARB THERAPY RXD/TAKEN: CPT | Performed by: NURSE PRACTITIONER

## 2024-07-12 PROCEDURE — 99213 OFFICE O/P EST LOW 20 MIN: CPT | Performed by: NURSE PRACTITIONER

## 2024-07-12 PROCEDURE — 3075F SYST BP GE 130 - 139MM HG: CPT | Performed by: NURSE PRACTITIONER

## 2024-07-12 RX ORDER — TRAMADOL HYDROCHLORIDE 50 MG/1
100 TABLET ORAL 2 TIMES DAILY
COMMUNITY

## 2024-07-12 ASSESSMENT — PATIENT HEALTH QUESTIONNAIRE - PHQ9
1. LITTLE INTEREST OR PLEASURE IN DOING THINGS: SEVERAL DAYS
2. FEELING DOWN, DEPRESSED OR HOPELESS: SEVERAL DAYS
10. IF YOU CHECKED OFF ANY PROBLEMS, HOW DIFFICULT HAVE THESE PROBLEMS MADE IT FOR YOU TO DO YOUR WORK, TAKE CARE OF THINGS AT HOME, OR GET ALONG WITH OTHER PEOPLE: SOMEWHAT DIFFICULT
SUM OF ALL RESPONSES TO PHQ9 QUESTIONS 1 & 2: 2

## 2024-07-12 ASSESSMENT — PAIN SCALES - GENERAL: PAINLEVEL: 6

## 2024-07-12 NOTE — PROGRESS NOTES
"Noah Allen is here today in follow up for left lumbar radiculopathy and to review images.     To review, he was evaluated on 08/25/2023, by Dr. Jas Bernstein for history of cervical fusion (ACDF C4 - 7, PCDF C4 - 7). He reported creaking / cracking in cervical spine. On exam, he had no neuro deficits noted. Dr. Bernstein recommended no further cervical surgical intervention.     At his visit 05/29/2024, he reported low back pain (pointed to low back into left buttocks then into left posterior leg into ankle to foot) since 2019, \"right after my (neck) surgery\" without inciting event. He states he was a  and \"that started every pain in my body\". Pain limited ALL activities. Standing, walking caused back pain and left leg numbness. Pain was up to a 10, washing dishes was a 10. He had no relief from current medication. On exam, he had motor weakness in left hip flexor. Thoracic and lumbar x-rays and referral to PT for HEP were requested.    Today, he continues with left lumbar radicular symptoms. He started PT 2.5 weeks ago without any benefit thus far. He had imaging completed and is here to review findings.    XR T SPINE on 06/17/2024:  IMPRESSION:  Mild multilevel spondylosis of the thoracic spine without acute  abnormality  Signed by: Mohit Mccormick    XR L SPINE on 06/17/2024  IMPRESSION:  Severe facet disease lower lumbar spine. Moderate to severe  multilevel spondylosis. No acute abnormality seen  Signed by: Mohit Mccormick      TREATMENTS:  PT: 06/24/2024, 07/10/2024  Home Exercise Program: daily lumbar stretches, core strengthening since 06/24/2024  Gabapentin - no help  Tramadol- no help    SMOKER: No  ANTICOAGULANT USE: YES: Daily baby ASA, WARFARIN for Buerger's disease     ROS x 10 is, otherwise, negative unless documented in HPI above    /72 (BP Location: Left arm, Patient Position: Sitting, BP Cuff Size: Adult)   Pulse 73   Temp 36.6 °C (97.8 °F) (Temporal)   Ht 1.829 m (6')  "  Wt 79.4 kg (175 lb)   BMI 23.73 kg/m²     On Exam: Appears comfortable  A&O x 4, speech clear / fluent  Respirations even / unlabored  Abdomen without distension  MICHELLE  Gait: is steady    Noah Allen has left lumbar radiculopathy. We reviewed x-ray images with note of significant degenerative changes in the lumbar spine. We discussed continuation of PT / HEP (he has been going for 2 - 3 weeks thus far). He agrees to contact me after 6 weeks of therapy and, if symptoms have not responded to conservative management, will request MRI L Spine. He verbalizes understanding and agreement with plan.   Soumya Cates, APRN-CNP  .

## 2024-07-16 ENCOUNTER — TREATMENT (OUTPATIENT)
Dept: PHYSICAL THERAPY | Facility: CLINIC | Age: 54
End: 2024-07-16
Payer: MEDICARE

## 2024-07-16 DIAGNOSIS — M54.42 LEFT-SIDED LOW BACK PAIN WITH LEFT-SIDED SCIATICA, UNSPECIFIED CHRONICITY: ICD-10-CM

## 2024-07-16 PROCEDURE — 97112 NEUROMUSCULAR REEDUCATION: CPT | Mod: GP,CQ

## 2024-07-16 PROCEDURE — 97110 THERAPEUTIC EXERCISES: CPT | Mod: GP,CQ

## 2024-07-16 NOTE — PROGRESS NOTES
Physical Therapy Treatment Note      Patient Name Noah Allen  MRN: [id number]  Today's Date: 7/16/2024  Time Calculation  Start Time: 0745  Stop Time: 0830  Time Calculation (min): 45 min    Insurance: Payor: [employer]IC[employer] / Plan: [employer] MEDICARE ASSURE / Product Type: *No Product type* / VISITS ARE MED NEC NO AUTH NEEDED PAYS %   Visit #: 3  Date of Onset:    6/1/2022   -authorization required: no    General:  Next MD appt: 7/12/24   Soumya Cates, APRN-CNP     Problem List Items Addressed This Visit             ICD-10-CM    Left-sided low back pain with left-sided sciatica M54.42       Assessment:  skilled intervention: exercise progression for lumbar stabilization/balance  Updated HEP    Patient would continue to benefit from skilled PT to address remaining functional, objective and subjective deficits to allow them to return to full independence with ADLs     Progressed with standing  stabilization , pain unchanged from arrival post rx, though some LE fatigue and RLE pain     Plan:  Progress Standing stabilization    Precautions: mod fall risk  Some coordination issues from cervical spine problem  2-3 falls in last 6 months    Subjective:  General:  Arrives with more pain, can fluctuate. Has good days and bad days. Saw Soumya Cates , continue with therapy and update her with progress in 3 weeks. Compliant with HEP.    Functional Progress:    No falls    Pain  Pain assessment 0-10  Pain score: 6  Pain location:  L LS    Compliant with HEP:  yes    Understanding of HEP: WNL    Objective:  Therapeutic Exercise  25 minutes  see below  **indicates new exercises or progression  NP=not performed    Neuromuscular Re-education:  20 minutes  See below  **indicates new exercises or progression  NP=not performed        Other     Exercise Log:  Nustep L3 6' **  SKC 10x  LTR 10x  Supine hamstring stretch 10 sec 5x  Supine piriformis stretch 10 sec 5x  reinstruct    Pelvic tilts 10x   Adductor sets 10x   Hooklying hip abduction blue 10x   DLS SLR 10x     SLS 10 sec 5x BLE airex    Airex calf raises 10x2  Airex balance beam side stepping 5x  Airex balance beam heel/toe walk 5x   DLS flex/ext blue 15x **                                   Education:  Instructed in progression of exercises and issued written instructions /theraloop

## 2024-07-22 ENCOUNTER — TREATMENT (OUTPATIENT)
Dept: PHYSICAL THERAPY | Facility: CLINIC | Age: 54
End: 2024-07-22
Payer: MEDICARE

## 2024-07-22 DIAGNOSIS — M54.42 LEFT-SIDED LOW BACK PAIN WITH LEFT-SIDED SCIATICA, UNSPECIFIED CHRONICITY: ICD-10-CM

## 2024-07-22 PROCEDURE — 97112 NEUROMUSCULAR REEDUCATION: CPT | Mod: GP

## 2024-07-22 PROCEDURE — 97110 THERAPEUTIC EXERCISES: CPT | Mod: GP

## 2024-07-22 NOTE — PROGRESS NOTES
"                                                                Physical Therapy Treatment Note      Patient Name Noah Allen  MRN: [id number]  Today's Date: 7/22/2024  Time Calculation  Start Time: 1330  Stop Time: 1410  Time Calculation (min): 40 min    Insurance: Payor: [employer]ICARE / Plan: A[employer] MEDICARE ASSURE / Product Type: *No Product type* /  VISITS ARE MED NEC NO AUTH NEEDED PAYS %   Visit #: 4  Date of Onset: 6/1/2022   -authorization required: no    General:  Next MD appt: needs to call in 3 weeks VU King-CNP   Problem List Items Addressed This Visit             ICD-10-CM    Left-sided low back pain with left-sided sciatica M54.42       Assessment:  skilled intervention: exercise progression for core stabilization    Patient would continue to benefit from skilled PT to address remaining functional, objective and subjective deficits to allow them to return to full independence with ADLs     Had episode of inc pain since last visit but seems to be resolving and was able to complete full program today with progressions    Plan:  Step exercises    Precautions: mod fall risk  Some coordination issues from cervical spine problem  2-3 falls in last 6 months    Subjective:  General:  Had an episode of inc pain last week that felt like a knife but doesn't recall what he ws doing when it happened but reports that it was \"something normal\"  Starting to resolve  No radicular sx today    Functional Progress:  Will get numbness/tingling with prolonged standing  No falls  No recent awakening due to pain    Pain  Pain assessment 0-10  Pain score: 6-7  Pain location: R>L lumbar     Compliant with HEP:  yes    Understanding of HEP: WNL    Objective:  Therapeutic Exercise  25 minutes  see below  **indicates new exercises or progression  NP=not performed    Neuromuscular Re-education:  15 minutes  See below  **indicates new exercises or progression  NP=not performed    Other     Exercise " Log:  Nustep L3 6'   Swiss ball stretches 5 sec 5x 3 way **  SKC 10x  LTR 10x  Supine hamstring stretch 10 sec 5x  Supine piriformis stretch 10 sec 5x    Pelvic tilts 10x   Adductor sets 10x   Hooklying hip abduction blue 10x   DLS SLR 10x      SLS 10 sec 5x BLE airex    Airex calf raises 10x2  Airex balance beam side stepping 5x  Airex balance beam heel/toe walk 5x   DLS flex/ext blue 15x   Abdominal pull downs 3 way blue 10x **    Education:  Instructed in progression of exercises and issued written instructions/tband  V/c's for hold times/reps

## 2024-07-29 ENCOUNTER — TREATMENT (OUTPATIENT)
Dept: PHYSICAL THERAPY | Facility: CLINIC | Age: 54
End: 2024-07-29
Payer: MEDICARE

## 2024-07-29 DIAGNOSIS — M54.42 LEFT-SIDED LOW BACK PAIN WITH LEFT-SIDED SCIATICA, UNSPECIFIED CHRONICITY: ICD-10-CM

## 2024-07-29 PROCEDURE — 97110 THERAPEUTIC EXERCISES: CPT | Mod: GP

## 2024-07-29 PROCEDURE — 97112 NEUROMUSCULAR REEDUCATION: CPT | Mod: GP

## 2024-07-29 NOTE — PROGRESS NOTES
Physical Therapy Treatment Note      Patient Name Noah Allen  MRN: [id number]  Today's Date: 7/29/2024  Time Calculation  Start Time: 1510  Stop Time: 1555  Time Calculation (min): 45 min    Insurance: Payor: [employer] MEDICARE / Plan: A[employer] MEDICARE ASSURE / Product Type: *No Product type* / VISITS ARE MED NEC NO AUTH NEEDED PAYS %   Visit #: 5  Date of Onset:  6/1/2022   -authorization required: no    General:  Next MD appt: needs to call in 3 weeks VU King-CNP     Problem List Items Addressed This Visit             ICD-10-CM    Left-sided low back pain with left-sided sciatica M54.42       Assessment:  skilled intervention: exercise progression for function    Patient would continue to benefit from skilled PT to address remaining functional, objective and subjective deficits to allow them to return to full independence with ADLs     Progressed with step exercises for strength and for improved function  Pt needs to be more active at home  Completes all exercises without difficulty including dynamic balance activities on compliant (foam) surface  No evidence of foot drop noted t/o PT session and demonstrates ability to heel walk B    Plan:  1 more visit then discharge to [facility]    Precautions: mod fall risk  Some coordination issues from cervical spine problem  2-3 falls in last 6 months    Subjective:  General:  Starting to get all the same sx on the R /RLE that he has on L-used a CP with good relief  Still feels like he has a knife in his back  Reporting that he has B foot drop    Functional Progress:  Numbness with standing to do laundry and do dishes  No falls since last visit  Reports he is doing a lot of sleeping- last night slept for 15 hrs-got up for 15' meeting  Reports no difficulty with LE dsg  Reports he did a lot of cleaning and laundry this wekend    Pain  Pain assessment 0-10  Pain score: 5  Pain location: B  "LS/BLE    Compliant with HEP:  yes    Understanding of HEP: needs review    Objective:  Therapeutic Exercise  30 minutes  see below  **indicates new exercises or progression  NP=not performed    Neuromuscular Re-education:  15 minutes  See below  **indicates new exercises or progression  NP=not performed    Other   Ankle DF  5/5    Exercise Log:  Nustep L3 6'   Swiss ball stretches 5 sec 5x 3 way   SKC 10x  LTR 10x  Supine hamstring stretch 10 sec 5x  Supine piriformis stretch 10 sec 5x    Pelvic tilts 10x (HEP)  Adductor sets 10x (HEP)  Hooklying hip abduction blue 10x   DLS SLR 10x      SLS 10 sec 5x BLE airex    Airex calf raises 10x2  Airex balance beam side stepping 5x  Airex balance beam heel/toe walk 5x   DLS flex/ext blue 15x   Abdominal pull downs 3 way blue 10x   Step ups F/L 6\" 10x BLE **    Education:  Instructed in progression of exercises and to inc activity level at home  V/c's to complete standing stabilization exercises        "

## 2024-07-31 ENCOUNTER — ANTICOAGULATION - WARFARIN VISIT (OUTPATIENT)
Dept: PHARMACY | Facility: CLINIC | Age: 54
End: 2024-07-31
Payer: MEDICARE

## 2024-07-31 DIAGNOSIS — I73.1 THROMBOANGIITIS OBLITERANS (BUERGER'S DISEASE) (CMS-HCC): Primary | ICD-10-CM

## 2024-07-31 LAB
POC INR: 2.2
POC PROTHROMBIN TIME: NORMAL

## 2024-07-31 PROCEDURE — 99212 OFFICE O/P EST SF 10 MIN: CPT

## 2024-07-31 PROCEDURE — 85610 PROTHROMBIN TIME: CPT | Mod: QW

## 2024-07-31 NOTE — PROGRESS NOTES
Coumadin Clinic Visit Note    Patient verified warfarin dose  No missed doses  No unusual bruising or bleeding  No changes to medications  Consistent dietary green intake  No anticipated procedures at this time  INR Therapeutic today at 2.2  No changes to warfarin dose today  Next appointment 5 weeks      Soumya Sanchez, PharmD

## 2024-08-05 ENCOUNTER — TELEPHONE (OUTPATIENT)
Dept: NEUROSURGERY | Facility: CLINIC | Age: 54
End: 2024-08-05

## 2024-08-05 ENCOUNTER — TREATMENT (OUTPATIENT)
Dept: PHYSICAL THERAPY | Facility: CLINIC | Age: 54
End: 2024-08-05
Payer: MEDICARE

## 2024-08-05 DIAGNOSIS — M54.42 LEFT-SIDED LOW BACK PAIN WITH LEFT-SIDED SCIATICA, UNSPECIFIED CHRONICITY: Primary | ICD-10-CM

## 2024-08-05 DIAGNOSIS — M54.42 LEFT-SIDED LOW BACK PAIN WITH LEFT-SIDED SCIATICA, UNSPECIFIED CHRONICITY: ICD-10-CM

## 2024-08-05 PROCEDURE — 97110 THERAPEUTIC EXERCISES: CPT | Mod: GP

## 2024-08-05 PROCEDURE — 97112 NEUROMUSCULAR REEDUCATION: CPT | Mod: GP

## 2024-08-05 NOTE — PROGRESS NOTES
Physical Therapy Treatment Note      Patient Name Noah Allen  MRN: [id number]  Today's Date: 8/5/2024  Time Calculation  Start Time: 1245  Stop Time: 1325  Time Calculation (min): 40 min    Insurance: Payor: AETNA MEDICARE / Plan: AETNA MEDICARE ASSURE / Product Type: *No Product type* /  VISITS ARE MED NEC NO AUTH NEEDED PAYS %   Visit #: 6  Date of Onset: 6/1/2022   -authorization required: no      General:  Next MD appt: needs to call in 3 weeks Soumya Cates, APRN-CNP     Problem List Items Addressed This Visit             ICD-10-CM    Left-sided low back pain with left-sided sciatica M54.42       Assessment:  skilled intervention: Education for [facility]  Reasmt for report period 6/24/24-8/5/24    LTG   I HEP (met)  Improve functional outcome score to indicate improved functional mobility (not met)  Dec pain L LS/LLE 50% on pain scale with dec pain with walking/standing (not met)  Inc posture awareness (met)  Inc lumbar ROM WFL with improved LE dsg (partially met for ROM, met for function)  Inc LE flexibility WFL (partially met)  Inc core/hip flexor strength 5/5 with improved walking tolerance (partially met)  Improve SLS > 15 sec with reports of no falls (met for no falls (met)    Has progressed with inc ROM/strength and some improvement with function and has had no falls. His pain level remains the same    Plan:  Discharge to [facility] for report period 6/24/24-8/5/24    Precautions: mod fall risk  Some coordination issues from cervical spine problem  2-3 falls in last 6 months    Subjective:  General:  Overall about the same  Plans to make appt with Soumya Cates today    Functional Progress:  Went to [place] yesterday and had inc LLE pain from  siting for drive but it relieved when he was able to move around  Moved daughter into college over the weekend  No falls  Doing dishes painful  LE dsg WNL    Pain  Pain assessment 0-10  Pain score: 6  Pain  "location: L LS    Compliant with HEP:  yes    Understanding of HEP: WNL    Objective:  Therapeutic Exercise  25 minutes  see below  **indicates new exercises or progression  NP=not performed    Neuromuscular Re-education:  15 minutes  See below  **indicates new exercises or progression  NP=not performed    Modalities   Cold Pack                          minutes    Other   Lumbar ROM  FB: 80%  BB: 100%  RSB: 40%  LSB: 40%     Flexibility   Hamstrings B 70  piriformis: B min tight     Strength  BLE 5/5 except hip flex 4/5     abdominals: 4+/5  back extensors bridges 75% with moderate instability noted     Exercise Log:  Nustep L3 6'   Swiss ball stretches 5 sec 5x 3 way   SKC 10x  LTR 10x  Supine hamstring stretch 10 sec 5x  Supine piriformis stretch 10 sec 5x    Pelvic tilts 10x   Adductor sets 10x (HEP)  Hooklying hip abduction blue 10x   DLS SLR 10x      SLS 10 sec 5x BLE airex    Airex calf raises 10x2  Airex balance beam side stepping 5x  Airex balance beam heel/toe walk 5x   DLS flex/ext blue 15x   Abdominal pull downs 3 way blue 10x   Step ups F/L 6\" 10x BLE     Education:  Review of HEP and progression    Outcome Measures:  Other Measures  Oswestry Disablity Index (FARNAZ): 54       "

## 2024-08-05 NOTE — PROGRESS NOTES
Noah Allen has failed conservative management for left lumbar radiculopathy, including PT / HEP, oral medications, activity adjustment. He continues with low back pain with radiating numbness in his left leg when walking / standing.    The dates of Physical Therapy:  06/24/2024,   07/10/2024,   07/16/2024,   07/22/2024,   07/29/2024,   08/05/2024    Home Exercise Program: daily lumbar stretches, core strengthening since 06/24/2024    MRI Lumbar Spine is being requested to evaluate for lumbar nerve root impingement to cause his left lumbar radicular symptoms. X-ray findings were not helpful with nerve root impingement evaluation. MRI will assist with determination of the next step in treatment: surgical vs non-surgical.  Soumya Cates, APRN-CNP

## 2024-08-08 ENCOUNTER — OFFICE VISIT (OUTPATIENT)
Dept: UROLOGY | Facility: HOSPITAL | Age: 54
End: 2024-08-08
Payer: MEDICARE

## 2024-08-08 DIAGNOSIS — Z96.0 S/P INSERTION OF PENILE IMPLANT: Primary | ICD-10-CM

## 2024-08-08 PROCEDURE — 4010F ACE/ARB THERAPY RXD/TAKEN: CPT | Performed by: UROLOGY

## 2024-08-08 PROCEDURE — 99211 OFF/OP EST MAY X REQ PHY/QHP: CPT | Performed by: UROLOGY

## 2024-08-08 NOTE — PROGRESS NOTES
POV    HISTORY OF PRESENT ILLNESS:   Noah Allen is a 54 y.o. male who is being seen today for fuv    Pt had IPP placed on 6/19/24.  Coloplast Titan IPP IP approach, size 18 cm with no rear-tip extenders. Classic pump. 125 mL reservoir in space of Retzius (left). 100 mL in the system. discharged home with drain    6/21/24 - Doing well.  No significant pain, no f/c. Scant output in drain. Drain removed    7/2/24 - Doing well    8/8/24 - Trouble cycling, no f./c    PAST MEDICAL HISTORY:  Past Medical History:   Diagnosis Date    Alcohol abuse     quit 12/1/22    Anemia, unspecified 06/29/2020    Mild anemia, H/H= 14/43.8 on 9/28/23    Salinas's esophagus     Buerger's disease (CMS-[facility])     managed by PCP Dr. Jain: on Coumadin, stoppage requested    Cervical radiculopathy     Cervical spondylosis     Chronic pain     CKD (chronic kidney disease)     BUN/CR= 9/0.89 with eGFR >90 on 11/21/23    Contusion of left front wall of thorax, initial encounter 10/23/2021    Contusion of left chest wall, initial encounter    Depression     Disease of spinal cord, unspecified (Multi) 04/02/2018    Cervical myelopathy    DM (diabetes mellitus) (Multi)     A1C= 5.7% on 11/21/23    ED (erectile dysfunction)     Essential (primary) hypertension 09/25/2018    HTN (hypertension), benign    GERD (gastroesophageal reflux disease)     Hematuria     following with urology    HLD (hyperlipidemia)     Hyponatremia     VJ=856, 11/21/23    Lightheadedness     Long term (current) use of anticoagulants 04/04/2018    Chronic anticoagulation    Lumbar radiculopathy     Numbness     SAAD (obstructive sleep apnea)     Palpitations     Phimosis of penis     PVD (peripheral vascular disease) (CMS-[facility])     Vitamin D deficiency        PAST SURGICAL HISTORY:  Past Surgical History:   Procedure Laterality Date    CERVICAL FUSION      COLONOSCOPY      KNEE SURGERY  06/02/2015    Knee Surgery    OTHER SURGICAL HISTORY  01/26/2018    Anterior Spinal  Diskectomy, Osteophytectomy Cerv Interspace    UPPER GASTROINTESTINAL ENDOSCOPY          ALLERGIES:   Allergies   Allergen Reactions    Penicillins Anaphylaxis    Sulfa (Sulfonamide Antibiotics) Anaphylaxis    Lisinopril Cough     cough        MEDICATIONS:   Current Outpatient Medications   Medication Instructions    acetaminophen (TYLENOL) 1,000 mg, oral, Every 6 hours PRN    amLODIPine (NORVASC) 10 mg, oral, Daily    aspirin 81 mg, oral, Daily    atorvastatin (LIPITOR) 40 mg, oral, Daily    baclofen (Lioresal) 10 mg tablet 1 tablet, oral, 2 times daily PRN    chlorhexidine (Peridex) 0.12 % solution Swish for 30 seconds and spit 15mL of solution the night before and morning of surgery    cholecalciferol (Vitamin D-3) 50 mcg (2,000 unit) capsule 1 tablet, oral, Daily    ciclopirox (Penlac) 8 % solution Topical, Daily, Remove weekly    docusate sodium (COLACE) 100 mg, oral, 2 times daily    DULoxetine (CYMBALTA) 60 mg, oral, 2 times daily    enoxaparin (LOVENOX) 80 mg, subcutaneous, Daily    ferrous sulfate 65 mg, oral, Daily with breakfast    gabapentin (Neurontin) 300 mg capsule 3 capsules, oral, 2 times daily, Take as directed    ibuprofen 600 mg, oral, Every 6 hours PRN    losartan (COZAAR) 100 mg, oral, Daily    losartan (COZAAR) 100 mg, oral, Daily    metFORMIN XR (GLUCOPHAGE-XR) 500 mg, oral, 2 times daily    omeprazole (PRILOSEC) 40 mg, oral, 2 times daily    oxyCODONE (ROXICODONE) 5 mg, oral, Every 6 hours PRN    traMADol (ULTRAM) 100 mg, oral, 2 times daily    warfarin (COUMADIN) 7.5 mg, oral, Nightly        PHYSICAL EXAM:  There were no vitals taken for this visit.  Constitutional: Patient appears well-developed and well-nourished. No distress.    Pulmonary/Chest: Effort normal. No respiratory distress.   Abdominal: Soft, ND NT  : Tips in good position, pump midline and dependent.  Cycles well. Incision well healed  Musculoskeletal: Normal range of motion.    Neurological: Alert and oriented to person,  place, and time.  Psychiatric: Normal mood and affect. Behavior is normal. Thought content normal.      Labs  Lab Results   Component Value Date    PSA 0.64 11/13/2019     Lab Results   Component Value Date    GFRMALE >90 09/28/2023     Lab Results   Component Value Date    CREATININE 1.42 (H) 06/12/2024    CREATININE 1.35 (H) 06/12/2024     Lab Results   Component Value Date    CHOL 174 04/18/2023     Lab Results   Component Value Date    HDL 41.3 04/18/2023     Lab Results   Component Value Date    CHHDL 4.2 04/18/2023     Lab Results   Component Value Date    LDLF 99 04/18/2023     Lab Results   Component Value Date    VLDL 34 04/18/2023     Lab Results   Component Value Date    TRIG 169 (H) 04/18/2023     Lab Results   Component Value Date    HGBA1C 5.7 (H) 11/21/2023       Lab Results   Component Value Date    HCT 40.5 (L) 06/12/2024       Imaging    Procedures      Assessment:      1. S/P insertion of penile implant          Noah Allen is a 54 y.o. male here for POV    Doing well  Plan:   1) Taught how to cycle  2) Start cycling daily  3) Warning signs discussed    No restrictions    FU in 3m, sooner if needed

## 2024-08-10 ENCOUNTER — APPOINTMENT (OUTPATIENT)
Dept: RADIOLOGY | Facility: HOSPITAL | Age: 54
End: 2024-08-10
Payer: MEDICARE

## 2024-08-10 ENCOUNTER — HOSPITAL ENCOUNTER (EMERGENCY)
Facility: HOSPITAL | Age: 54
Discharge: HOME | End: 2024-08-10
Attending: EMERGENCY MEDICINE
Payer: MEDICARE

## 2024-08-10 VITALS
TEMPERATURE: 98.2 F | RESPIRATION RATE: 20 BRPM | HEIGHT: 72 IN | DIASTOLIC BLOOD PRESSURE: 82 MMHG | SYSTOLIC BLOOD PRESSURE: 158 MMHG | BODY MASS INDEX: 23.7 KG/M2 | OXYGEN SATURATION: 97 % | WEIGHT: 175 LBS | HEART RATE: 74 BPM
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
## 2024-08-10 DIAGNOSIS — R51.9 ACUTE NONINTRACTABLE HEADACHE, UNSPECIFIED HEADACHE TYPE: Primary | ICD-10-CM

## 2024-08-10 LAB
ABO GROUP (TYPE) IN BLOOD: NORMAL
ALBUMIN SERPL BCP-MCNC: 4.4 G/DL (ref 3.4–5)
ALP SERPL-CCNC: 111 U/L (ref 33–120)
ALT SERPL W P-5'-P-CCNC: 11 U/L (ref 10–52)
ANION GAP SERPL CALC-SCNC: 14 MMOL/L (ref 10–20)
ANTIBODY SCREEN: NORMAL
AST SERPL W P-5'-P-CCNC: 15 U/L (ref 9–39)
BASOPHILS # BLD AUTO: 0.04 X10*3/UL (ref 0–0.1)
BASOPHILS NFR BLD AUTO: 0.6 %
BILIRUB SERPL-MCNC: 0.5 MG/DL (ref 0–1.2)
BUN SERPL-MCNC: 13 MG/DL (ref 6–23)
CALCIUM SERPL-MCNC: 9.4 MG/DL (ref 8.6–10.3)
CHLORIDE SERPL-SCNC: 101 MMOL/L (ref 98–107)
CO2 SERPL-SCNC: 24 MMOL/L (ref 21–32)
CREAT SERPL-MCNC: 1.53 MG/DL (ref 0.5–1.3)
EGFRCR SERPLBLD CKD-EPI 2021: 53 ML/MIN/1.73M*2
EOSINOPHIL # BLD AUTO: 0.15 X10*3/UL (ref 0–0.7)
EOSINOPHIL NFR BLD AUTO: 2.2 %
ERYTHROCYTE [DISTWIDTH] IN BLOOD BY AUTOMATED COUNT: 13.7 % (ref 11.5–14.5)
ETHANOL SERPL-MCNC: <10 MG/DL
GLUCOSE SERPL-MCNC: 168 MG/DL (ref 74–99)
HCT VFR BLD AUTO: 38.3 % (ref 41–52)
HGB BLD-MCNC: 12.3 G/DL (ref 13.5–17.5)
IMM GRANULOCYTES # BLD AUTO: 0.03 X10*3/UL (ref 0–0.7)
IMM GRANULOCYTES NFR BLD AUTO: 0.4 % (ref 0–0.9)
INR PPP: 4.7 (ref 0.9–1.1)
LACTATE SERPL-SCNC: 1 MMOL/L (ref 0.4–2)
LYMPHOCYTES # BLD AUTO: 1.52 X10*3/UL (ref 1.2–4.8)
LYMPHOCYTES NFR BLD AUTO: 22.6 %
MCH RBC QN AUTO: 27.6 PG (ref 26–34)
MCHC RBC AUTO-ENTMCNC: 32.1 G/DL (ref 32–36)
MCV RBC AUTO: 86 FL (ref 80–100)
MONOCYTES # BLD AUTO: 0.42 X10*3/UL (ref 0.1–1)
MONOCYTES NFR BLD AUTO: 6.2 %
NEUTROPHILS # BLD AUTO: 4.57 X10*3/UL (ref 1.2–7.7)
NEUTROPHILS NFR BLD AUTO: 68 %
NRBC BLD-RTO: 0 /100 WBCS (ref 0–0)
PLATELET # BLD AUTO: 222 X10*3/UL (ref 150–450)
POTASSIUM SERPL-SCNC: 4 MMOL/L (ref 3.5–5.3)
PROT SERPL-MCNC: 7.2 G/DL (ref 6.4–8.2)
PROTHROMBIN TIME: 53.5 SECONDS (ref 9.8–12.8)
RBC # BLD AUTO: 4.46 X10*6/UL (ref 4.5–5.9)
RH FACTOR (ANTIGEN D): NORMAL
SODIUM SERPL-SCNC: 135 MMOL/L (ref 136–145)
WBC # BLD AUTO: 6.7 X10*3/UL (ref 4.4–11.3)

## 2024-08-10 PROCEDURE — 83605 ASSAY OF LACTIC ACID: CPT | Performed by: EMERGENCY MEDICINE

## 2024-08-10 PROCEDURE — 72131 CT LUMBAR SPINE W/O DYE: CPT

## 2024-08-10 PROCEDURE — 72131 CT LUMBAR SPINE W/O DYE: CPT | Performed by: RADIOLOGY

## 2024-08-10 PROCEDURE — 72125 CT NECK SPINE W/O DYE: CPT

## 2024-08-10 PROCEDURE — 72125 CT NECK SPINE W/O DYE: CPT | Performed by: RADIOLOGY

## 2024-08-10 PROCEDURE — 99285 EMERGENCY DEPT VISIT HI MDM: CPT | Mod: 25 | Performed by: EMERGENCY MEDICINE

## 2024-08-10 PROCEDURE — 36415 COLL VENOUS BLD VENIPUNCTURE: CPT | Performed by: EMERGENCY MEDICINE

## 2024-08-10 PROCEDURE — 70450 CT HEAD/BRAIN W/O DYE: CPT

## 2024-08-10 PROCEDURE — 99285 EMERGENCY DEPT VISIT HI MDM: CPT | Performed by: EMERGENCY MEDICINE

## 2024-08-10 PROCEDURE — 72128 CT CHEST SPINE W/O DYE: CPT | Performed by: RADIOLOGY

## 2024-08-10 PROCEDURE — 85025 COMPLETE CBC W/AUTO DIFF WBC: CPT | Performed by: EMERGENCY MEDICINE

## 2024-08-10 PROCEDURE — 70450 CT HEAD/BRAIN W/O DYE: CPT | Performed by: RADIOLOGY

## 2024-08-10 PROCEDURE — 2550000001 HC RX 255 CONTRASTS: Performed by: EMERGENCY MEDICINE

## 2024-08-10 PROCEDURE — 74177 CT ABD & PELVIS W/CONTRAST: CPT | Performed by: RADIOLOGY

## 2024-08-10 PROCEDURE — 71260 CT THORAX DX C+: CPT | Performed by: RADIOLOGY

## 2024-08-10 PROCEDURE — 74177 CT ABD & PELVIS W/CONTRAST: CPT

## 2024-08-10 PROCEDURE — 85610 PROTHROMBIN TIME: CPT | Performed by: EMERGENCY MEDICINE

## 2024-08-10 PROCEDURE — 80053 COMPREHEN METABOLIC PANEL: CPT | Performed by: EMERGENCY MEDICINE

## 2024-08-10 PROCEDURE — 72128 CT CHEST SPINE W/O DYE: CPT

## 2024-08-10 PROCEDURE — 82077 ASSAY SPEC XCP UR&BREATH IA: CPT | Performed by: EMERGENCY MEDICINE

## 2024-08-10 PROCEDURE — 86901 BLOOD TYPING SEROLOGIC RH(D): CPT | Performed by: EMERGENCY MEDICINE

## 2024-08-10 RX ORDER — MORPHINE SULFATE 4 MG/ML
4 INJECTION, SOLUTION INTRAMUSCULAR; INTRAVENOUS ONCE
Status: DISCONTINUED | OUTPATIENT
Start: 2024-08-10 | End: 2024-08-10 | Stop reason: HOSPADM

## 2024-08-10 RX ADMIN — IOHEXOL 75 ML: 350 INJECTION, SOLUTION INTRAVENOUS at 15:12

## 2024-08-10 ASSESSMENT — LIFESTYLE VARIABLES
EVER FELT BAD OR GUILTY ABOUT YOUR DRINKING: NO
HAVE PEOPLE ANNOYED YOU BY CRITICIZING YOUR DRINKING: NO
HAVE YOU EVER FELT YOU SHOULD CUT DOWN ON YOUR DRINKING: NO
TOTAL SCORE: 0
EVER HAD A DRINK FIRST THING IN THE MORNING TO STEADY YOUR NERVES TO GET RID OF A HANGOVER: NO

## 2024-08-10 ASSESSMENT — PAIN SCALES - GENERAL
PAINLEVEL_OUTOF10: 4
PAINLEVEL_OUTOF10: 7
PAINLEVEL_OUTOF10: 4

## 2024-08-10 ASSESSMENT — COLUMBIA-SUICIDE SEVERITY RATING SCALE - C-SSRS
2. HAVE YOU ACTUALLY HAD ANY THOUGHTS OF KILLING YOURSELF?: NO
1. IN THE PAST MONTH, HAVE YOU WISHED YOU WERE DEAD OR WISHED YOU COULD GO TO SLEEP AND NOT WAKE UP?: NO
6. HAVE YOU EVER DONE ANYTHING, STARTED TO DO ANYTHING, OR PREPARED TO DO ANYTHING TO END YOUR LIFE?: NO

## 2024-08-10 ASSESSMENT — PAIN - FUNCTIONAL ASSESSMENT: PAIN_FUNCTIONAL_ASSESSMENT: 0-10

## 2024-08-10 ASSESSMENT — PAIN DESCRIPTION - PAIN TYPE: TYPE: ACUTE PAIN
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
## 2024-08-10 NOTE — DISCHARGE INSTRUCTIONS
You are diagnosed with what we suspect is likely a concussion please follow-up with your primary physician in the coming days for repeat evaluation.  Should you been experiencing changes in vision numbness tingling weakness forgetfulness symptoms concerning to call 911 or return to the nearest emergency department immediately.  Your INR was found to be elevated therefore I did recommend that you hold 1 dose of warfarin and follow-up with your primary physician for repeat INR check.
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
## 2024-08-12 ENCOUNTER — TELEPHONE (OUTPATIENT)
Dept: NEUROSURGERY | Facility: CLINIC | Age: 54
End: 2024-08-12
Payer: MEDICARE

## 2024-08-21 DIAGNOSIS — E78.5 HYPERLIPIDEMIA, UNSPECIFIED: ICD-10-CM

## 2024-08-21 DIAGNOSIS — I10 ESSENTIAL (PRIMARY) HYPERTENSION: ICD-10-CM

## 2024-08-21 RX ORDER — LOSARTAN POTASSIUM 100 MG/1
100 TABLET ORAL DAILY
Qty: 90 TABLET | Refills: 1 | Status: SHIPPED | OUTPATIENT
Start: 2024-08-21

## 2024-08-21 RX ORDER — AMLODIPINE BESYLATE 10 MG/1
10 TABLET ORAL DAILY
Qty: 90 TABLET | Refills: 1 | Status: SHIPPED | OUTPATIENT
Start: 2024-08-21

## 2024-08-21 RX ORDER — ATORVASTATIN CALCIUM 40 MG/1
40 TABLET, FILM COATED ORAL DAILY
Qty: 90 TABLET | Refills: 1 | Status: SHIPPED | OUTPATIENT
Start: 2024-08-21

## 2024-08-26 ENCOUNTER — HOSPITAL ENCOUNTER (OUTPATIENT)
Dept: RADIOLOGY | Facility: HOSPITAL | Age: 54
Discharge: HOME | End: 2024-08-26
Payer: MEDICARE

## 2024-08-26 DIAGNOSIS — M54.42 LEFT-SIDED LOW BACK PAIN WITH LEFT-SIDED SCIATICA, UNSPECIFIED CHRONICITY: ICD-10-CM

## 2024-08-26 PROCEDURE — 72148 MRI LUMBAR SPINE W/O DYE: CPT

## 2024-08-26 PROCEDURE — 72148 MRI LUMBAR SPINE W/O DYE: CPT | Performed by: RADIOLOGY

## 2024-09-04 ENCOUNTER — APPOINTMENT (OUTPATIENT)
Dept: PHARMACY | Facility: CLINIC | Age: 54
End: 2024-09-04
Payer: MEDICARE

## 2024-09-05 ENCOUNTER — LAB (OUTPATIENT)
Dept: LAB | Facility: LAB | Age: 54
End: 2024-09-05
Payer: MEDICARE

## 2024-09-05 ENCOUNTER — ANTICOAGULATION - WARFARIN VISIT (OUTPATIENT)
Dept: PHARMACY | Facility: CLINIC | Age: 54
End: 2024-09-05
Payer: MEDICARE

## 2024-09-05 DIAGNOSIS — Z13.220 LIPID SCREENING: ICD-10-CM

## 2024-09-05 DIAGNOSIS — E11.69 TYPE 2 DIABETES MELLITUS WITH OTHER SPECIFIED COMPLICATION, WITHOUT LONG-TERM CURRENT USE OF INSULIN (MULTI): ICD-10-CM

## 2024-09-05 DIAGNOSIS — Z00.00 HEALTH CARE MAINTENANCE: ICD-10-CM

## 2024-09-05 DIAGNOSIS — K21.9 GASTROESOPHAGEAL REFLUX DISEASE, UNSPECIFIED WHETHER ESOPHAGITIS PRESENT: Primary | ICD-10-CM

## 2024-09-05 DIAGNOSIS — I73.1 THROMBOANGIITIS OBLITERANS (BUERGER'S DISEASE) (CMS-HCC): Primary | ICD-10-CM

## 2024-09-05 DIAGNOSIS — Z12.5 SCREENING FOR PROSTATE CANCER: ICD-10-CM

## 2024-09-05 LAB
ALBUMIN SERPL BCP-MCNC: 4.3 G/DL (ref 3.4–5)
ALP SERPL-CCNC: 120 U/L (ref 33–120)
ALT SERPL W P-5'-P-CCNC: 15 U/L (ref 10–52)
ANION GAP SERPL CALC-SCNC: 14 MMOL/L (ref 10–20)
AST SERPL W P-5'-P-CCNC: 15 U/L (ref 9–39)
BILIRUB SERPL-MCNC: 0.4 MG/DL (ref 0–1.2)
BUN SERPL-MCNC: 15 MG/DL (ref 6–23)
CALCIUM SERPL-MCNC: 9.5 MG/DL (ref 8.6–10.6)
CHLORIDE SERPL-SCNC: 100 MMOL/L (ref 98–107)
CHOLEST SERPL-MCNC: 182 MG/DL (ref 0–199)
CHOLESTEROL/HDL RATIO: 4.1
CO2 SERPL-SCNC: 31 MMOL/L (ref 21–32)
CREAT SERPL-MCNC: 1.31 MG/DL (ref 0.5–1.3)
CREAT UR-MCNC: 152.1 MG/DL (ref 20–370)
EGFRCR SERPLBLD CKD-EPI 2021: 64 ML/MIN/1.73M*2
ERYTHROCYTE [DISTWIDTH] IN BLOOD BY AUTOMATED COUNT: 13.8 % (ref 11.5–14.5)
EST. AVERAGE GLUCOSE BLD GHB EST-MCNC: 126 MG/DL
GLUCOSE SERPL-MCNC: 95 MG/DL (ref 74–99)
HBA1C MFR BLD: 6 %
HCT VFR BLD AUTO: 38.8 % (ref 41–52)
HDLC SERPL-MCNC: 44.5 MG/DL
HGB BLD-MCNC: 12.3 G/DL (ref 13.5–17.5)
LDLC SERPL CALC-MCNC: 89 MG/DL
MCH RBC QN AUTO: 27.2 PG (ref 26–34)
MCHC RBC AUTO-ENTMCNC: 31.7 G/DL (ref 32–36)
MCV RBC AUTO: 86 FL (ref 80–100)
MICROALBUMIN UR-MCNC: 9.7 MG/L
MICROALBUMIN/CREAT UR: 6.4 UG/MG CREAT
NON HDL CHOLESTEROL: 138 MG/DL (ref 0–149)
NRBC BLD-RTO: 0 /100 WBCS (ref 0–0)
PLATELET # BLD AUTO: 266 X10*3/UL (ref 150–450)
POC INR: 3
POC PROTHROMBIN TIME: NORMAL
POTASSIUM SERPL-SCNC: 4.3 MMOL/L (ref 3.5–5.3)
PROT SERPL-MCNC: 6.8 G/DL (ref 6.4–8.2)
PSA SERPL-MCNC: 1 NG/ML
RBC # BLD AUTO: 4.53 X10*6/UL (ref 4.5–5.9)
SODIUM SERPL-SCNC: 141 MMOL/L (ref 136–145)
TRIGL SERPL-MCNC: 242 MG/DL (ref 0–149)
TSH SERPL-ACNC: 1.13 MIU/L (ref 0.44–3.98)
VLDL: 48 MG/DL (ref 0–40)
WBC # BLD AUTO: 7.3 X10*3/UL (ref 4.4–11.3)

## 2024-09-05 PROCEDURE — 85610 PROTHROMBIN TIME: CPT | Mod: QW | Performed by: PHARMACIST

## 2024-09-05 PROCEDURE — 99212 OFFICE O/P EST SF 10 MIN: CPT | Performed by: PHARMACIST

## 2024-09-05 PROCEDURE — 36415 COLL VENOUS BLD VENIPUNCTURE: CPT

## 2024-09-05 RX ORDER — OMEPRAZOLE 40 MG/1
40 CAPSULE, DELAYED RELEASE ORAL 2 TIMES DAILY
Qty: 60 CAPSULE | Refills: 0 | Status: SHIPPED | OUTPATIENT
Start: 2024-09-05
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
## 2024-09-13 ENCOUNTER — APPOINTMENT (OUTPATIENT)
Dept: NEUROSURGERY | Facility: CLINIC | Age: 54
End: 2024-09-13
Payer: MEDICARE

## 2024-09-13 VITALS
TEMPERATURE: 97.3 F | DIASTOLIC BLOOD PRESSURE: 68 MMHG | WEIGHT: 170 LBS | HEIGHT: 72 IN | HEART RATE: 68 BPM | BODY MASS INDEX: 23.03 KG/M2 | SYSTOLIC BLOOD PRESSURE: 110 MMHG

## 2024-09-13 DIAGNOSIS — M54.16 RIGHT LUMBAR RADICULOPATHY: ICD-10-CM

## 2024-09-13 DIAGNOSIS — M54.16 LEFT LUMBAR RADICULOPATHY: ICD-10-CM

## 2024-09-13 DIAGNOSIS — M54.16 BILATERAL LUMBAR RADICULOPATHY: Primary | ICD-10-CM

## 2024-09-13 PROCEDURE — 99215 OFFICE O/P EST HI 40 MIN: CPT | Performed by: NURSE PRACTITIONER

## 2024-09-13 PROCEDURE — 3044F HG A1C LEVEL LT 7.0%: CPT | Performed by: NURSE PRACTITIONER

## 2024-09-13 PROCEDURE — 4010F ACE/ARB THERAPY RXD/TAKEN: CPT | Performed by: NURSE PRACTITIONER

## 2024-09-13 PROCEDURE — 3008F BODY MASS INDEX DOCD: CPT | Performed by: NURSE PRACTITIONER

## 2024-09-13 PROCEDURE — 3061F NEG MICROALBUMINURIA REV: CPT | Performed by: NURSE PRACTITIONER

## 2024-09-13 PROCEDURE — 3048F LDL-C <100 MG/DL: CPT | Performed by: NURSE PRACTITIONER

## 2024-09-13 PROCEDURE — 1036F TOBACCO NON-USER: CPT | Performed by: NURSE PRACTITIONER

## 2024-09-13 PROCEDURE — 3074F SYST BP LT 130 MM HG: CPT | Performed by: NURSE PRACTITIONER

## 2024-09-13 PROCEDURE — 3078F DIAST BP <80 MM HG: CPT | Performed by: NURSE PRACTITIONER

## 2024-09-13 RX ORDER — METHYLPREDNISOLONE 4 MG/1
TABLET ORAL
Qty: 21 TABLET | Refills: 0 | Status: SHIPPED | OUTPATIENT
Start: 2024-09-13

## 2024-09-13 ASSESSMENT — PATIENT HEALTH QUESTIONNAIRE - PHQ9
3. TROUBLE FALLING OR STAYING ASLEEP OR SLEEPING TOO MUCH: NEARLY EVERY DAY
9. THOUGHTS THAT YOU WOULD BE BETTER OFF DEAD, OR OF HURTING YOURSELF: NOT AT ALL
1. LITTLE INTEREST OR PLEASURE IN DOING THINGS: MORE THAN HALF THE DAYS
8. MOVING OR SPEAKING SO SLOWLY THAT OTHER PEOPLE COULD HAVE NOTICED. OR THE OPPOSITE, BEING SO FIGETY OR RESTLESS THAT YOU HAVE BEEN MOVING AROUND A LOT MORE THAN USUAL: NOT AT ALL
2. FEELING DOWN, DEPRESSED OR HOPELESS: MORE THAN HALF THE DAYS
5. POOR APPETITE OR OVEREATING: NOT AT ALL
4. FEELING TIRED OR HAVING LITTLE ENERGY: NEARLY EVERY DAY
7. TROUBLE CONCENTRATING ON THINGS, SUCH AS READING THE NEWSPAPER OR WATCHING TELEVISION: NOT AT ALL
SUM OF ALL RESPONSES TO PHQ QUESTIONS 1-9: 10
6. FEELING BAD ABOUT YOURSELF - OR THAT YOU ARE A FAILURE OR HAVE LET YOURSELF OR YOUR FAMILY DOWN: NOT AT ALL
SUM OF ALL RESPONSES TO PHQ9 QUESTIONS 1 AND 2: 4

## 2024-09-13 ASSESSMENT — PAIN SCALES - GENERAL: PAINLEVEL: 8

## 2024-09-13 ASSESSMENT — ENCOUNTER SYMPTOMS: OCCASIONAL FEELINGS OF UNSTEADINESS: 0
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
## 2024-09-23 ENCOUNTER — ANTICOAGULATION - WARFARIN VISIT (OUTPATIENT)
Dept: PHARMACY | Facility: CLINIC | Age: 54
End: 2024-09-23
Payer: MEDICARE

## 2024-09-23 DIAGNOSIS — I73.1 THROMBOANGIITIS OBLITERANS (BUERGER'S DISEASE): Primary | ICD-10-CM

## 2024-09-23 LAB
POC INR: 3
POC PROTHROMBIN TIME: NORMAL

## 2024-09-23 PROCEDURE — 99212 OFFICE O/P EST SF 10 MIN: CPT

## 2024-09-23 PROCEDURE — 85610 PROTHROMBIN TIME: CPT | Mod: QW

## 2024-09-23 RX ORDER — ENOXAPARIN SODIUM 100 MG/ML
80 INJECTION SUBCUTANEOUS EVERY 12 HOURS
Qty: 14 EACH | Refills: 0 | Status: SHIPPED | OUTPATIENT
Start: 2024-09-23 | End: 2024-09-30

## 2024-09-23 NOTE — PROGRESS NOTES
"No bleeding or unusual bruising.  Medications and doses verified.  Pt is having neck injections 10/1/24 and 10/15/24. Holding Warfarin 5 days prior to each procedure with Lovenox bridge.    Wt = 81.6 kg  SrCr = 1.31  PLT = 266  CrCl = 72.67 ml/min  INR=3.0     Plan: Continue same weekly dose and follow Lovenox bridging schedule.  Follow up INR check within 1 week after procedure.    Enoxaparin (Lovenox) \"Bridging for Warfarin (Coumadin/Jantoven)  Enoxaparin (Lovenox) Dose and Frequency:  80 mg every 12 hours                                 Pre-Op and Post-Op Anticoagulation Instructions            Day          Date               Instructions             -7               -6               -5 9/26/24 Do not take Warfarin             -4 9/27/24 Do not take Warfarin  Inject Enoxaparin (Lovenox) in the morning and evening (12 hours apart)             -3 9/28/24 Do not take Warfarin  Inject Enoxaparin (Lovenox) in the morning and evening (12 hours apart)             -2 9/29/24 Do not take Warfarin  Inject Enoxaparin (Lovenox) in the morning and evening (12 hours apart)             -1  DAY BEFORE PROCEDURE 9/30/24 Do not take Warfarin  Only take Enoxaparin (Lovenox) dose in the morning  Do not take evening Enoxaparin (Lovenox)        DAY OF         PROCEDURE 10/1/24 Do not take Enoxaparin (Lovenox) today  Take your usual dose of warfarin in the evening            +1 10/2/24 Take you usual dose of warfarin  Inject Enoxaparin (Lovenox) in the morning and evening (12 hours apart)            +2 10/3/24 Take you usual dose of warfarin  Inject Enoxaparin (Lovenox) in the morning and evening (12 hours apart)            +3 10/4/24 Take you usual dose of warfarin  Inject Enoxaparin (Lovenox) in the morning and evening (12 hours apart)            +4 10/4/24 Schedule appointment with Anticoagulation Clinic to access further need for Enoxaparin (Lovenox)            +5       "

## 2024-09-23 NOTE — PATIENT INSTRUCTIONS
"  Enoxaparin (Lovenox) \"Bridging for Warfarin (Coumadin/Jantoven)  Enoxaparin (Lovenox) Dose and Frequency:  80 mg every 12 hours                                 Pre-Op and Post-Op Anticoagulation Instructions            Day          Date               Instructions             -7               -6               -5 9/26/24 Do not take Warfarin             -4 9/27/24 Do not take Warfarin  Inject Enoxaparin (Lovenox) in the morning and evening (12 hours apart)             -3 9/28/24 Do not take Warfarin  Inject Enoxaparin (Lovenox) in the morning and evening (12 hours apart)             -2 9/29/24 Do not take Warfarin  Inject Enoxaparin (Lovenox) in the morning and evening (12 hours apart)             -1  DAY BEFORE PROCEDURE 9/30/24 Do not take Warfarin  Only take Enoxaparin (Lovenox) dose in the morning  Do not take evening Enoxaparin (Lovenox)        DAY OF         PROCEDURE 10/1/24 Do not take Enoxaparin (Lovenox) today  Take your usual dose of warfarin in the evening            +1 10/2/24 Take you usual dose of warfarin  Inject Enoxaparin (Lovenox) in the morning and evening (12 hours apart)            +2 10/3/24 Take you usual dose of warfarin  Inject Enoxaparin (Lovenox) in the morning and evening (12 hours apart)            +3 10/4/24 Take you usual dose of warfarin  Inject Enoxaparin (Lovenox) in the morning and evening (12 hours apart)            +4 10/4/24 Schedule appointment with Anticoagulation Clinic to access further need for Enoxaparin (Lovenox)            +5         "

## 2024-09-26 ENCOUNTER — APPOINTMENT (OUTPATIENT)
Dept: PRIMARY CARE | Facility: CLINIC | Age: 54
End: 2024-09-26
Payer: MEDICARE

## 2024-09-26 VITALS — DIASTOLIC BLOOD PRESSURE: 78 MMHG | SYSTOLIC BLOOD PRESSURE: 120 MMHG

## 2024-09-26 DIAGNOSIS — I73.1 THROMBOANGIITIS OBLITERANS (BUERGER'S DISEASE): ICD-10-CM

## 2024-09-26 DIAGNOSIS — I10 ESSENTIAL (PRIMARY) HYPERTENSION: ICD-10-CM

## 2024-09-26 DIAGNOSIS — K21.9 GASTROESOPHAGEAL REFLUX DISEASE, UNSPECIFIED WHETHER ESOPHAGITIS PRESENT: ICD-10-CM

## 2024-09-26 DIAGNOSIS — E27.9 LESION OF ADRENAL GLAND (MULTI): Primary | ICD-10-CM

## 2024-09-26 DIAGNOSIS — E78.5 HYPERLIPIDEMIA, UNSPECIFIED: ICD-10-CM

## 2024-09-26 DIAGNOSIS — E11.9 TYPE 2 DIABETES MELLITUS WITHOUT COMPLICATIONS (MULTI): ICD-10-CM

## 2024-09-26 PROCEDURE — 3074F SYST BP LT 130 MM HG: CPT | Performed by: STUDENT IN AN ORGANIZED HEALTH CARE EDUCATION/TRAINING PROGRAM

## 2024-09-26 PROCEDURE — G2211 COMPLEX E/M VISIT ADD ON: HCPCS | Performed by: STUDENT IN AN ORGANIZED HEALTH CARE EDUCATION/TRAINING PROGRAM

## 2024-09-26 PROCEDURE — 99214 OFFICE O/P EST MOD 30 MIN: CPT | Performed by: STUDENT IN AN ORGANIZED HEALTH CARE EDUCATION/TRAINING PROGRAM

## 2024-09-26 PROCEDURE — 4010F ACE/ARB THERAPY RXD/TAKEN: CPT | Performed by: STUDENT IN AN ORGANIZED HEALTH CARE EDUCATION/TRAINING PROGRAM

## 2024-09-26 PROCEDURE — 3048F LDL-C <100 MG/DL: CPT | Performed by: STUDENT IN AN ORGANIZED HEALTH CARE EDUCATION/TRAINING PROGRAM

## 2024-09-26 PROCEDURE — 3044F HG A1C LEVEL LT 7.0%: CPT | Performed by: STUDENT IN AN ORGANIZED HEALTH CARE EDUCATION/TRAINING PROGRAM

## 2024-09-26 PROCEDURE — 3078F DIAST BP <80 MM HG: CPT | Performed by: STUDENT IN AN ORGANIZED HEALTH CARE EDUCATION/TRAINING PROGRAM

## 2024-09-26 PROCEDURE — 3061F NEG MICROALBUMINURIA REV: CPT | Performed by: STUDENT IN AN ORGANIZED HEALTH CARE EDUCATION/TRAINING PROGRAM

## 2024-09-26 PROCEDURE — 1036F TOBACCO NON-USER: CPT | Performed by: STUDENT IN AN ORGANIZED HEALTH CARE EDUCATION/TRAINING PROGRAM

## 2024-09-26 RX ORDER — METFORMIN HYDROCHLORIDE 500 MG/1
500 TABLET, EXTENDED RELEASE ORAL 2 TIMES DAILY
Qty: 180 TABLET | Refills: 1 | Status: SHIPPED | OUTPATIENT
Start: 2024-09-26

## 2024-09-26 RX ORDER — WARFARIN 7.5 MG/1
7.5 TABLET ORAL NIGHTLY
Qty: 90 TABLET | Refills: 1 | Status: SHIPPED | OUTPATIENT
Start: 2024-09-26

## 2024-09-26 RX ORDER — OMEPRAZOLE 40 MG/1
40 CAPSULE, DELAYED RELEASE ORAL 2 TIMES DAILY
Qty: 180 CAPSULE | Refills: 1 | Status: SHIPPED | OUTPATIENT
Start: 2024-09-26

## 2024-09-26 RX ORDER — ATORVASTATIN CALCIUM 40 MG/1
40 TABLET, FILM COATED ORAL DAILY
Qty: 90 TABLET | Refills: 1 | Status: SHIPPED | OUTPATIENT
Start: 2024-09-26

## 2024-09-26 NOTE — PROGRESS NOTES
Subjective   Patient ID: Noah Allen is a 54 y.o. male who presents for Follow-up and Med Refill.    HPI   Routine fu.    He is feeling OK in general.    He has been seeing back specialists for chronic back pain.    He wants to review recent labs. Several questions today.    He continues to abstain from alcohol.  Review of Systems  12-point ROS reviewed and was negative unless otherwise noted in HPI.    Objective   /78     Physical Exam  GEN: conversant, NAD  HEENT: PERRL, EOMI, MMM, TMs clear b/l  NECK: supple  CV: S1, S2, RRR  PULM: CTAB  ABD: soft, NT, ND  NEURO: no new gross focal deficits  EXT: no sig LE edema  PSYCH: appropriate affect    Assessment/Plan     #Adrenal lesion: enlarging on CT from 8/10/24, he has upcoming appointment with surgical specialist for further evaluation.    #microscopic hematuria  -Follows with Nephrology and urology; done w/ Urology; can follow up again if any problems     #T2DM  :: A1c 6.0%, improved after alcohol cessation and weight loss     #previous EtOH Use DIsorder  - Following w/ AA  - Has abstained from EtOH since 12/1/2022     #Depression- stable on Duloxetine, follows with psychiatry     #HTN- stable, maintained on amlodipine and losartan.     #Neck Surgeries, Neuropathy- Follows neurosurgery, maintained on baclofen and gabapentin; workman's comp; no further needs at this time  #Buerger's Disease- Maintained on coumadin, quit smoking 5 years ago, follows with INR clinic  #GERD, Salinas's esophagus- On PPI, last EGD in Nov. 2023, next due Nov. 2026  #HLD- On atorvastatin 40mg  #CKD- Follows with nephrology at [facility]   #SAAD: seeing sleep medicine, CPAP has been advised     #Former cigarette smoker: quit ~2019. Last underwent LDCT in 8/2024; no suspicious nodules; next due in Nov-Dec. 2024.      #Health Maintenance  Has had colonoscopy 2020 (repeat due in 10 years), repeat EGD for Salinas's due Nov. 2026 (pt prefers to call GI provider to schedule  this), has COVID vaccines  LDCT screening for lung cancer next due in 8/2025  Longitudinal care. Labs reviewed.     RTC- 3-6 month

## 2024-10-04 ENCOUNTER — APPOINTMENT (OUTPATIENT)
Dept: UROLOGY | Facility: CLINIC | Age: 54
End: 2024-10-04
Payer: MEDICARE

## 2024-10-04 ENCOUNTER — ANTICOAGULATION - WARFARIN VISIT (OUTPATIENT)
Dept: PHARMACY | Facility: CLINIC | Age: 54
End: 2024-10-04
Payer: MEDICARE

## 2024-10-04 ENCOUNTER — APPOINTMENT (OUTPATIENT)
Dept: PHARMACY | Facility: CLINIC | Age: 54
End: 2024-10-04
Payer: MEDICARE

## 2024-10-04 VITALS — HEART RATE: 77 BPM | TEMPERATURE: 97.4 F | SYSTOLIC BLOOD PRESSURE: 144 MMHG | DIASTOLIC BLOOD PRESSURE: 84 MMHG

## 2024-10-04 DIAGNOSIS — I73.1 THROMBOANGIITIS OBLITERANS (BUERGER'S DISEASE): Primary | ICD-10-CM

## 2024-10-04 DIAGNOSIS — E27.8 ADRENAL MASS (MULTI): Primary | ICD-10-CM

## 2024-10-04 LAB
POC INR: 1
POC PROTHROMBIN TIME: NORMAL

## 2024-10-04 PROCEDURE — 99205 OFFICE O/P NEW HI 60 MIN: CPT | Performed by: STUDENT IN AN ORGANIZED HEALTH CARE EDUCATION/TRAINING PROGRAM

## 2024-10-04 PROCEDURE — 3048F LDL-C <100 MG/DL: CPT | Performed by: STUDENT IN AN ORGANIZED HEALTH CARE EDUCATION/TRAINING PROGRAM

## 2024-10-04 PROCEDURE — 3044F HG A1C LEVEL LT 7.0%: CPT | Performed by: STUDENT IN AN ORGANIZED HEALTH CARE EDUCATION/TRAINING PROGRAM

## 2024-10-04 PROCEDURE — 3077F SYST BP >= 140 MM HG: CPT | Performed by: STUDENT IN AN ORGANIZED HEALTH CARE EDUCATION/TRAINING PROGRAM

## 2024-10-04 PROCEDURE — 3061F NEG MICROALBUMINURIA REV: CPT | Performed by: STUDENT IN AN ORGANIZED HEALTH CARE EDUCATION/TRAINING PROGRAM

## 2024-10-04 PROCEDURE — 1036F TOBACCO NON-USER: CPT | Performed by: STUDENT IN AN ORGANIZED HEALTH CARE EDUCATION/TRAINING PROGRAM

## 2024-10-04 PROCEDURE — 4010F ACE/ARB THERAPY RXD/TAKEN: CPT | Performed by: STUDENT IN AN ORGANIZED HEALTH CARE EDUCATION/TRAINING PROGRAM

## 2024-10-04 PROCEDURE — 3079F DIAST BP 80-89 MM HG: CPT | Performed by: STUDENT IN AN ORGANIZED HEALTH CARE EDUCATION/TRAINING PROGRAM

## 2024-10-04 PROCEDURE — 85610 PROTHROMBIN TIME: CPT | Mod: QW

## 2024-10-04 PROCEDURE — 99212 OFFICE O/P EST SF 10 MIN: CPT

## 2024-10-04 RX ORDER — ENOXAPARIN SODIUM 100 MG/ML
80 INJECTION SUBCUTANEOUS EVERY 12 HOURS
Qty: 14 EACH | Refills: 0 | Status: SHIPPED | OUTPATIENT
Start: 2024-10-04

## 2024-10-04 RX ORDER — DEXAMETHASONE 1 MG/1
1 TABLET ORAL ONCE
Qty: 1 TABLET | Refills: 0 | Status: SHIPPED | OUTPATIENT
Start: 2024-10-04 | End: 2024-10-04

## 2024-10-04 NOTE — PATIENT INSTRUCTIONS
"Enoxaparin (Lovenox) \"Bridging for Warfarin (Coumadin/Jantoven)  Enoxaparin (Lovenox) Dose and Frequency:  80 mg every 12 hours                                 Pre-Op and Post-Op Anticoagulation Instructions            Day          Date               Instructions             -7               -6               -5 10/10/24 Do not take Warfarin             -4 10/11/24 Do not take Warfarin  Inject Enoxaparin (Lovenox) in the morning and evening (12 hours apart)             -3 10/12/24 Do not take Warfarin  Inject Enoxaparin (Lovenox) in the morning and evening (12 hours apart)             -2 10/13/24 Do not take Warfarin  Inject Enoxaparin (Lovenox) in the morning and evening (12 hours apart)             -1  DAY BEFORE PROCEDURE 10/14/24 Do not take Warfarin  Only take Enoxaparin (Lovenox) dose in the morning  Do not take evening Enoxaparin (Lovenox)        DAY OF         PROCEDURE 10/15/24 Do not take Enoxaparin (Lovenox) today  Take your usual dose of warfarin in the evening            +1 10/16/24 Take you usual dose of warfarin  Inject Enoxaparin (Lovenox) in the morning and evening (12 hours apart)            +2 10/17/24 Take you usual dose of warfarin  Inject Enoxaparin (Lovenox) in the morning and evening (12 hours apart)            +3 10/18/24 Take you usual dose of warfarin  Inject Enoxaparin (Lovenox) in the morning and evening (12 hours apart)            +4 10/18/24 Schedule appointment with Anticoagulation Clinic to access further need for Enoxaparin (Lovenox)     "

## 2024-10-04 NOTE — PROGRESS NOTES
Subjective   Patient ID: Noah Allen is a 54 y.o. male who presents for adrenal mass.      HPI    Noah Allen is a 54 y.o. male who presents for adrenal mass, left    CT chest abdomen pelvis showed a low-attenuation lesion involving left adrenal gland measuring up to 3.1 x 3.4 cm size, significantly increased in size since previous  exam from 07/06/2023 from 15 x 13 mm previously. The right adrenal  gland within normal limits.    He is being followed by Dr. Jones, endocrinology. The testing for this to be metabolically active has been reportedly done.  I do not see those results.    PMHx: GERD, Stage 3 CKD, Buerger's disease, HTN, HLD, SAAD, Alcohol abuse disorder, DM type 2, PVD, and long term used of anticoagulant.     PSHx:  has a past surgical history that includes Knee surgery (06/02/2015); Other surgical history (01/26/2018); Colonoscopy; Upper gastrointestinal endoscopy; and Cervical fusion. IPP placed on 6/19/24.     Social: Former cigarette smoker: quit ~2019.  Previous EtOH use disorder, has abstained from EtOH since 12/1/2022.    Family: Cancer-related family history is not on file.     Review of Systems    All systems were reviewed. Anything negative was noted in the HPI.    Objective   Physical Exam  Gen: No acute distress      Psych: Alert and oriented x3      Neuro:  Normal ROM     Resp: Nonlabored respirations      CV: Regular rate and rhythm      Abd: S, NT, ND.     : Deferred     Skin: Warm, dry and intact without rashes      Lymphatics: No peripheral edema         Past Medical History:   Diagnosis Date    Alcohol abuse     quit 12/1/22    Anemia, unspecified 06/29/2020    Mild anemia, H/H= 14/43.8 on 9/28/23    Salinas's esophagus     Buerger's disease (CMS-HCC)     managed by PCP Dr. Jain: on Coumadin, stoppage requested    Cervical radiculopathy     Cervical spondylosis     Chronic pain     CKD (chronic kidney disease)     BUN/CR= 9/0.89 with eGFR >90 on 11/21/23    Contusion  of left front wall of thorax, initial encounter 10/23/2021    Contusion of left chest wall, initial encounter    Depression     Disease of spinal cord, unspecified 04/02/2018    Cervical myelopathy    DM (diabetes mellitus) (Multi)     A1C= 5.7% on 11/21/23    ED (erectile dysfunction)     Essential (primary) hypertension 09/25/2018    HTN (hypertension), benign    GERD (gastroesophageal reflux disease)     Hematuria     following with urology    HLD (hyperlipidemia)     Hyponatremia     CJ=198, 11/21/23    Lightheadedness     Long term (current) use of anticoagulants 04/04/2018    Chronic anticoagulation    Lumbar radiculopathy     Numbness     SAAD (obstructive sleep apnea)     Palpitations     Phimosis of penis     PVD (peripheral vascular disease) (CMS-HCC)     Vitamin D deficiency          Past Surgical History:   Procedure Laterality Date    CERVICAL FUSION      COLONOSCOPY      KNEE SURGERY  06/02/2015    Knee Surgery    OTHER SURGICAL HISTORY  01/26/2018    Anterior Spinal Diskectomy, Osteophytectomy Cerv Interspace    UPPER GASTROINTESTINAL ENDOSCOPY       Interpreted By:  Vladislav Dominguez,   ADDENDUM:  Please note the significant interval increase in size of left adrenal  gland mass when compared to previous study. Characterization in this  study is limited and therefore malignancy can not be excluded.  Further evaluation with MRI or dedicated CT scan per adrenal mass  protocol should be considered.          Critical Finding:  See findings. Notification was initiated on  8/10/2024 at 4:38 pm by  Vladislav Dominguez.  (**-YCF-**) Instructions:      Signed by: Vladislav Dominguez 8/10/2024 4:38 PM    STUDY:  CT CHEST ABDOMEN PELVIS W IV CONTRAST; 8/10/2024 3:31 pm    INDICATION:  Signs/Symptoms:Trauma    COMPARISON:  None.    ACCESSION NUMBER(S):  [id number]    ORDERING CLINICIAN:  ASTER RAMOS    TECHNIQUE:  CT of the chest, abdomen, and pelvis was performed.  Contiguous axial images were obtained at 5 mm slice  thickness  through the chest, and at 3 mm through the abdomen and pelvis.  Coronal and sagittal reconstructions at 3 mm slice thickness were  performed.  75 ml of contrast material Omnipaque 350 were administered  intravenously without immediate complication.    FINDINGS:  CHEST:    LUNG/PLEURA/LARGE AIRWAYS:  There is moderate pleural thickening and associated  atelectasis/scarring or infiltrate within the left lung base. There  is trace amount of left pleural effusion also suspected. Pleural base  nodule involving posteromedial aspect of the right upper lobe  measuring 4 mm in size, nonspecific in etiology. 4 mm pleural  thickening/nodule involving minor fissure.    VESSELS:  No traumatic aortic injury is appreciated within the limitations of  this non-EKG gated study. The thoracic aorta is of normal course and  caliber. Main pulmonary artery and its branches are normal in  caliber. Mild coronary artery calcifications are seen. The study is  not optimized for evaluation of coronary arteries.    HEART:  The heart is normal in size. There is no pericardial effusion.    MEDIASTINUM AND CHUCK:  No pneumomediastinum, abnormal mediastinal fluid collection or  mediastinal hematoma are appreciated. No mediastinal, hilar or  biaxillary adenopathy is present. The esophagus is normal in course  and caliber.    CHEST WALL AND LOWER NECK:  No acute fracture or dislocation of the included osseous structures  are appreciated. No suspicious osseous lesions are identified.  Multiple healed fractures of the left ribs.    ABDOMEN:    LIVER:  Multiple tiny subcentimeter low-attenuation lesions in the dome of  the liver suggestive of cysts but too small to fully characterize. No  suspicious liver lesions are seen.    GALLBLADDER:  The gallbladder is nondistended without evidence of radiopaque stone.    BILE DUCTS:  The intahepatic and extrahepatic bile ducts are not dilated.    PANCREAS:  The pancreas appears  unremarkable.    SPLEEN:  No parenchymal perfusion deficit of the spleen is appreciated to  suggest contusion or laceration. There is no subcapsular hematoma, no  perisplenic fluid collection.    ADRENAL GLANDS:  Low-attenuation lesion involving left adrenal gland measuring up to  3.1 x 3.4 cm size, significantly increased in size since previous  exam from 07/06/2023 from 15 x 13 mm previously. The right adrenal  gland within normal limits.    KIDNEYS AND URETERS:  No parenchymal perfusion deficit is appreciated in bilateral kidneys  to suggest contusion or laceration. There is no subcapsular hematoma,  no perinephric fluid collection. No hydroureteronephrosis or  nephroureterolithiasis is present.    PELVIS:    BLADDER:  The urinary bladder within normal limits as distended. There is a  penile pump in place.    REPRODUCTIVE ORGANS:  Penile prosthesis. No free fluid in the pelvis. No masses or  lymphadenopathy.    BOWEL:  The stomach is unremarkable. The small bowel is normal in caliber  without evidence of focal wall thickening or obstruction. There is  no evidence of focal wall thickening or dilatation of the large  bowel. Punctate calcifications in the appendix. The appendix  otherwise within normal limits.    VESSELS:  Diffuse wall calcification of the aorta. No aneurysm or dissection.    PERITONEUM/RETROPERITONEUM/LYMPH NODES:  There is no evidence of intra- or retroperitoneal hematoma. There is  no free or loculated fluid collection, no free intraperitoneal air.  No abdominopelvic lymphadenopathy is present.    BONES AND ABDOMINAL WALL:  No evidence of acute fracture or dislocation of the included osseous  structures. No suspicious osseous lesions are identified. The  abdominal wall soft tissues appear normal.    IMPRESSION:  CHEST  1. Left pleural base thickening and trace amount of left pleural  effusion with associated atelectasis or infiltrate. Recommend  clinical correlation and follow-up to document  resolution.  2. Multiple healed left rib fractures. No evidence for displaced  acute rib fracture or pneumothorax.  3. No definite evidence for acute intrathoracic injury is seen.  4. Additional detailed findings as above.    ABDOMEN - PELVIS  1. No CT evidence for acute intra-abdominal or intrapelvic injury.  2. Additional detailed findings as above.      MACRO:  None         Assessment/Plan     1. Adrenal mass (Multi)  Metanephrines Plasma    Cortisol AM              Plan:  After obtaining a medical history, performing a physical exam, reviewing all available outside medical records, radiographs, and laboratory studies, I had a lengthy discussion with the patient regarding implications of a diagnosis of adrenal mass.    Specifically, we  discussed that the vast majority (>85%) of incidentally-discovered adrenal lesions prove to be benign adenomas.  Indeed, adrenocortical carcinomas (ACC) are exceedingly rare with an incidence of approximately 1 per million or approximately 300 cases in the [place] per year.  Characterization of adrenal mass generally pivots on 3 factors: (1) size, (2) imaging characteristics, and (3) growth kinetics.    Median diameter of incidentally-discovered adrenal lesions is 3cm.   Lesions >=6cm are usually resected regardless of imaging characteristics, since >30% will prove malignant.  Definitive diagnosis of asymptomatic myelolipoma  - denoted by radiographic presence of macroscopic fat - is the one general exception to this rule.  Incidental adrenal lesions >=4cm require thorough metabolic evaluation and follow-up imaging; however, routine resection is unnecessary.  Lesions between 4 and 6 cm in size are malignant in approximately 6% of cases, and most experts suggest resection in individuals who are at an acceptable risk for surgery.   Indeed, this 4 cm threshold has been reported to afford a 93% sensitivity and 42% specificity for diagnosis of malignant adrenal lesions.     We  "briefly discussed the nuances of modern adrenal imaging. In addition to assessment of size, homogeneity, and contours, imaging affords determination of lesion density.  Adenomas are differentiated from other lesions by assessment of intra-cytoplasmic lipid content.   Non-contrast computed tomography (CT) affords near 100% specificity for diagnosis of adrenal adenomas.   Indeed essentially all lesions that exhibit attenuation of <10 HU on unenhanced CT are adenomas.  The sensitivity of this 10 HU radiographic cutoff, however, is imperfect, and some 30% of adrenal adenomas will register attenuation above this level.  Such lesions are deemed indeterminate; however, the majority of these lipid-poor adenomas that exhibit a density above 10HU can still be differentiated from other adrenal pathology using the so-called \"washout\" imaging technique.  Lesions that lose more than 40-60% of gained enhancement on delayed contrast-enhanced CT imaging (i.e. those that \"washout\") should be managed as adenomas, since specificity of this technique approaches 100%.  Magnetic resonance imaging also can be useful for characterizing adrenal neoplasms.  Using opposed phase chemical-shift strategies, intracellular fat content of the adrenal lesions can be gauged.  Loss of signal intensity on out-of-phase sequences when compared to in-phase images demonstrates abundance of cytoplasmic lipid and identifies the lesion as an adenoma.  Although MR chemical-shift imaging is arguably superior to unenhanced CT in characterizing indeterminate lesions, CT washout studies are the gold standard imaging technique in characterizing lipid-poor adenoma.   Indeed, MR-based washout techniques are not clinically useful, since gadolinium contrast agents do not possess the dose-dependent signal intensity properties that are inherent in iodinated contrast agents (i.e. gadolinium contrast agents do not \"washout\").    Malignant transformation of adrenal " incidentalomas has been estimated at 1 in 1000, and current recommendations suggest that generally adrenal lesions that are not resected should be reimaged.   Approximately 5% to 9% of adrenal masses grow at least 1 cm in diameter upon interval follow-up, and such growth has been suggested as a trigger for resection.   Nevertheless, chances of uncovering malignant pathology in such instances are extremely low.     We discussed that  a metabolic evaluation is necessary for all adrenal incidentalomas, since over 10% are metabolically active.  Hypersecretion of cortisol, and catecholamines should be evaluated in all-comers, while aldosterone hypersecretion only needs to be ruled out in those with history of hypertension.      In cases when resection is necessary, the risks of the surgery were discussed in detail including medical and anesthetic complications (e.g. heart attack, stroke, deep vein thrombosis, pulmonary embolism, infection (pneumonia, etc), bleeding with possible need for transfusion, adjacent organ involvement or injury, wound complications, reaction to medications). I explained that for patients with left-sided adrenal tumors risks of splenectomy were remote but finite.  We reviewed the risks of adrenal insufficiency. The expected hospital and post-operative courses were reviewed.  We reviewed what to expect with regard to incisions, post-operative pain, and risks of subsequent hernias. I explained that during open surgery a portion of a rib may be resected.  I communicated that there is always a small chance of open conversion when minimally-invasive approaches are employed. For partial adrenalectomy, I communicated the risks of having to remove the entire adrenal gland.      I communicated that the decision process regarding adrenal surgery can be complex. I invited the patient to become an active participant in their care and to become proactive in learning about adrenal mass and the risks and  benefits of treatment.  I explained that I am always willing to answer any questions that may arise following the office visit and encouraged the patient to call my office with any questions during this anxiety-provoking period.         Follow up:     Left Robotic Adrenalectomy given growth kinetics. Dexamethasone suppression test with Cortisol AM and metanephrines plasma labs ordered prior.  Will reach out to Dr. Jones to communicate.  Will need PAT and anticoagulation recommendations.      Scribe Attestation  By signing my name below, I, Samantha Villatoro   attest that this documentation has been prepared under the direction and in the presence of Eliseo Payan MD MPH

## 2024-10-04 NOTE — PROGRESS NOTES
"No bleeding or unusual bruising.  Medications and doses verified.  Pt thinks he missed Warfarin dose the evening after the procedure, so back on Warfarin for 2 days only.  Patient is having another neck injection on 10/15/24 with a 5 day Warfarin hold and with a Lovenox bridge.  INR=1.0   Plan: Boost dose x2 then continue same weekly dose and follow bridging schedule.  Follow up INR check within 1 week of procedure.    Enoxaparin (Lovenox) \"Bridging for Warfarin (Coumadin/Jantoven)  Enoxaparin (Lovenox) Dose and Frequency:  80 mg every 12 hours                                 Pre-Op and Post-Op Anticoagulation Instructions            Day          Date               Instructions             -7               -6               -5 10/10/24 Do not take Warfarin             -4 10/11/24 Do not take Warfarin  Inject Enoxaparin (Lovenox) in the morning and evening (12 hours apart)             -3 10/12/24 Do not take Warfarin  Inject Enoxaparin (Lovenox) in the morning and evening (12 hours apart)             -2 10/13/24 Do not take Warfarin  Inject Enoxaparin (Lovenox) in the morning and evening (12 hours apart)             -1  DAY BEFORE PROCEDURE 10/14/24 Do not take Warfarin  Only take Enoxaparin (Lovenox) dose in the morning  Do not take evening Enoxaparin (Lovenox)        DAY OF         PROCEDURE 10/15/24 Do not take Enoxaparin (Lovenox) today  Take your usual dose of warfarin in the evening            +1 10/16/24 Take you usual dose of warfarin  Inject Enoxaparin (Lovenox) in the morning and evening (12 hours apart)            +2 10/17/24 Take you usual dose of warfarin  Inject Enoxaparin (Lovenox) in the morning and evening (12 hours apart)            +3 10/18/24 Take you usual dose of warfarin  Inject Enoxaparin (Lovenox) in the morning and evening (12 hours apart)            +4 10/18/24 Schedule appointment with Anticoagulation Clinic to access further need for Enoxaparin (Lovenox)            +5         "

## 2024-10-04 NOTE — ASSESSMENT & PLAN NOTE
After obtaining a medical history, performing a physical exam, reviewing all available outside medical records, radiographs, and laboratory studies, I had a lengthy discussion with the patient regarding implications of a diagnosis of adrenal mass.    Specifically, we  discussed that the vast majority (>85%) of incidentally-discovered adrenal lesions prove to be benign adenomas.  Indeed, adrenocortical carcinomas (ACC) are exceedingly rare with an incidence of approximately 1 per million or approximately 300 cases in the [place] per year.  Characterization of adrenal mass generally pivots on 3 factors: (1) size, (2) imaging characteristics, and (3) growth kinetics.    Median diameter of incidentally-discovered adrenal lesions is 3cm.   Lesions >=6cm are usually resected regardless of imaging characteristics, since >30% will prove malignant.  Definitive diagnosis of asymptomatic myelolipoma  - denoted by radiographic presence of macroscopic fat - is the one general exception to this rule.  Incidental adrenal lesions >=4cm require thorough metabolic evaluation and follow-up imaging; however, routine resection is unnecessary.  Lesions between 4 and 6 cm in size are malignant in approximately 6% of cases, and most experts suggest resection in individuals who are at an acceptable risk for surgery.   Indeed, this 4 cm threshold has been reported to afford a 93% sensitivity and 42% specificity for diagnosis of malignant adrenal lesions.     We briefly discussed the nuances of modern adrenal imaging. In addition to assessment of size, homogeneity, and contours, imaging affords determination of lesion density.  Adenomas are differentiated from other lesions by assessment of intra-cytoplasmic lipid content.   Non-contrast computed tomography (CT) affords near 100% specificity for diagnosis of adrenal adenomas.   Indeed essentially all lesions that exhibit attenuation of <10 HU on unenhanced CT are adenomas.  The  "sensitivity of this 10 HU radiographic cutoff, however, is imperfect, and some 30% of adrenal adenomas will register attenuation above this level.  Such lesions are deemed indeterminate; however, the majority of these lipid-poor adenomas that exhibit a density above 10HU can still be differentiated from other adrenal pathology using the so-called \"washout\" imaging technique.  Lesions that lose more than 40-60% of gained enhancement on delayed contrast-enhanced CT imaging (i.e. those that \"washout\") should be managed as adenomas, since specificity of this technique approaches 100%.  Magnetic resonance imaging also can be useful for characterizing adrenal neoplasms.  Using opposed phase chemical-shift strategies, intracellular fat content of the adrenal lesions can be gauged.  Loss of signal intensity on out-of-phase sequences when compared to in-phase images demonstrates abundance of cytoplasmic lipid and identifies the lesion as an adenoma.  Although MR chemical-shift imaging is arguably superior to unenhanced CT in characterizing indeterminate lesions, CT washout studies are the gold standard imaging technique in characterizing lipid-poor adenoma.   Indeed, MR-based washout techniques are not clinically useful, since gadolinium contrast agents do not possess the dose-dependent signal intensity properties that are inherent in iodinated contrast agents (i.e. gadolinium contrast agents do not \"washout\").    Malignant transformation of adrenal incidentalomas has been estimated at 1 in 1000, and current recommendations suggest that generally adrenal lesions that are not resected should be reimaged.   Approximately 5% to 9% of adrenal masses grow at least 1 cm in diameter upon interval follow-up, and such growth has been suggested as a trigger for resection.   Nevertheless, chances of uncovering malignant pathology in such instances are extremely low.     We discussed that  a metabolic evaluation is necessary for all " adrenal incidentalomas, since over 10% are metabolically active.  Hypersecretion of cortisol, and catecholamines should be evaluated in all-comers, while aldosterone hypersecretion only needs to be ruled out in those with history of hypertension.      In cases when resection is necessary, the risks of the surgery were discussed in detail including medical and anesthetic complications (e.g. heart attack, stroke, deep vein thrombosis, pulmonary embolism, infection (pneumonia, etc), bleeding with possible need for transfusion, adjacent organ involvement or injury, wound complications, reaction to medications). I explained that for patients with left-sided adrenal tumors risks of splenectomy were remote but finite.  We reviewed the risks of adrenal insufficiency. The expected hospital and post-operative courses were reviewed.  We reviewed what to expect with regard to incisions, post-operative pain, and risks of subsequent hernias. I explained that during open surgery a portion of a rib may be resected.  I communicated that there is always a small chance of open conversion when minimally-invasive approaches are employed. For partial adrenalectomy, I communicated the risks of having to remove the entire adrenal gland.      I communicated that the decision process regarding adrenal surgery can be complex. I invited the patient to become an active participant in their care and to become proactive in learning about adrenal mass and the risks and benefits of treatment.  I explained that I am always willing to answer any questions that may arise following the office visit and encouraged the patient to call my office with any questions during this anxiety-provoking period.

## 2024-10-04 NOTE — TELEPHONE ENCOUNTER
Sent eRx to [facility] for Enoxaparin 80mg subcutaneous q12h, #14 syr, 0RF

## 2024-10-07 ENCOUNTER — PREP FOR PROCEDURE (OUTPATIENT)
Dept: UROLOGY | Facility: CLINIC | Age: 54
End: 2024-10-07
Payer: MEDICARE

## 2024-10-10 ENCOUNTER — APPOINTMENT (OUTPATIENT)
Dept: PHARMACY | Facility: CLINIC | Age: 54
End: 2024-10-10
Payer: MEDICARE

## 2024-10-11 ENCOUNTER — LAB (OUTPATIENT)
Dept: LAB | Facility: LAB | Age: 54
End: 2024-10-11
Payer: MEDICARE

## 2024-10-11 DIAGNOSIS — E27.8 ADRENAL MASS (MULTI): ICD-10-CM

## 2024-10-11 LAB — CORTIS AM PEAK SERPL-MSCNC: 1.1 UG/DL (ref 5–20)

## 2024-10-11 PROCEDURE — 36415 COLL VENOUS BLD VENIPUNCTURE: CPT

## 2024-10-11 PROCEDURE — 82533 TOTAL CORTISOL: CPT

## 2024-10-11 PROCEDURE — 83835 ASSAY OF METANEPHRINES: CPT

## 2024-10-16 ENCOUNTER — APPOINTMENT (OUTPATIENT)
Dept: PODIATRY | Facility: CLINIC | Age: 54
End: 2024-10-16
Payer: MEDICARE

## 2024-10-16 DIAGNOSIS — B35.1 ONYCHOMYCOSIS: Primary | ICD-10-CM

## 2024-10-16 DIAGNOSIS — M79.671 FOOT PAIN, BILATERAL: ICD-10-CM

## 2024-10-16 DIAGNOSIS — M79.672 FOOT PAIN, BILATERAL: ICD-10-CM

## 2024-10-16 LAB
ANNOTATION COMMENT IMP: ABNORMAL
METANEPHS SERPL-SCNC: 0.14 NMOL/L (ref 0–0.49)
NORMETANEPH FREE SERPL-SCNC: 1.13 NMOL/L (ref 0–0.89)

## 2024-10-16 PROCEDURE — 99213 OFFICE O/P EST LOW 20 MIN: CPT | Performed by: PODIATRIST

## 2024-10-16 NOTE — PROGRESS NOTES
Chief Complaint   Patient presents with    fungal nail      Patient is here today for a follow up on fungal nails 1-5 JERRICA        HPI: Patient is here for follow-up topical Penlac for fungal nail treatments.  Has been using Penlac since March 2024.  Patient has multiple questions following other fungal nail treatments.  Patient is on warfarin for Buerger's disease.    Phyiscal Exam  Patient alert, oriented, no acute distress    VASC: +2/4 pedal pulses B/L.  CFT brisk all digits.  Skin temperature is warm to cool proximal distal bilateral foot.  No ischemic changes to the toes noted bilaterally.    NEURO: Vibratory diminished 1st MPJ B/L.  Light touch intact B/L.   5.07 [place] monofilament intact B/L.    DERM:Nails 1,2,3,5 L and 1,2,4,5 R are thickened, discolored, crumbly, painful and elongated with subungual debris. Pain on palpation to the nails. No cellulitis noted.   Clear base is noted on hallux B/L, minimal amounts.  This has not improved over the last couple of visits.    MUSCULOSKEL: +5/5 muscle strength B/L.    No ROM 1st MPJ R, limited ROM 1st MPJ L  Large exostosis noted 1st MPJ R    Assessment and Plan  #1  Onychomycosis  Debrided all nails without complications  Failed topical Penlac therapy  Discussed with patient that terbinafine interacts with Coumadin and he is not a candidate for this treatment at this time  Patient elects to continue topical Penlac therapy the nail he has only had a minimal improvement  Follow-up 2-3 months

## 2024-10-18 ENCOUNTER — PREP FOR PROCEDURE (OUTPATIENT)
Dept: UROLOGY | Facility: CLINIC | Age: 54
End: 2024-10-18

## 2024-10-18 ENCOUNTER — ANTICOAGULATION - WARFARIN VISIT (OUTPATIENT)
Dept: PHARMACY | Facility: CLINIC | Age: 54
End: 2024-10-18
Payer: MEDICARE

## 2024-10-18 DIAGNOSIS — I73.1 THROMBOANGIITIS OBLITERANS (BUERGER'S DISEASE): Primary | ICD-10-CM

## 2024-10-18 LAB
POC INR: 1.2
POC PROTHROMBIN TIME: NORMAL

## 2024-10-18 PROCEDURE — 85610 PROTHROMBIN TIME: CPT | Mod: QW | Performed by: PHARMACIST

## 2024-10-18 PROCEDURE — 99212 OFFICE O/P EST SF 10 MIN: CPT | Performed by: PHARMACIST

## 2024-10-18 NOTE — PROGRESS NOTES
Coumadin Clinic Visit Note    Patient presents today for anticoagulation monitoring and adjustment.  Patient verified warfarin dosing schedule  Patient denies missing any doses. Held 5 doses for neck injection on 10/15. He re-started warfarin later that evening and took 1 whole tablet on (10/15, 10/16) and 1 and 1/12 tablet on (10/17). He is still taking the lovenox and has about 2 days left of injections.   Patient denies unusual bruising or bleeding  Patient denies changes to medications, alcohol or tobacco use.  Consistent dietary green intake. We instructed patient to avoid green's that are high in Vitamin K  Surgery November 21st. Doctor has not given patient instructions on holding warfarin.   INR Subtherapeutic today at 1.2  Will give two bolus doses of 11.25 mg today and tomorrow then continue current weekly dose. Patient only has 2 days left of lovenox and refused continuing lovenox injections until he is in range.   Next appointment 1 week      Makayla Junior, ShannanD

## 2024-10-21 ENCOUNTER — TELEPHONE (OUTPATIENT)
Dept: PRIMARY CARE | Facility: CLINIC | Age: 54
End: 2024-10-21
Payer: MEDICARE

## 2024-10-25 ENCOUNTER — APPOINTMENT (OUTPATIENT)
Dept: PHARMACY | Facility: CLINIC | Age: 54
End: 2024-10-25
Payer: MEDICARE

## 2024-10-29 ENCOUNTER — PREP FOR PROCEDURE (OUTPATIENT)
Dept: UROLOGY | Facility: CLINIC | Age: 54
End: 2024-10-29

## 2024-10-29 ENCOUNTER — ANTICOAGULATION - WARFARIN VISIT (OUTPATIENT)
Dept: PHARMACY | Facility: CLINIC | Age: 54
End: 2024-10-29
Payer: MEDICARE

## 2024-10-29 DIAGNOSIS — I73.1 THROMBOANGIITIS OBLITERANS (BUERGER'S DISEASE): Primary | ICD-10-CM

## 2024-10-29 LAB
POC INR: 2.9
POC PROTHROMBIN TIME: NORMAL

## 2024-10-29 PROCEDURE — 99212 OFFICE O/P EST SF 10 MIN: CPT

## 2024-10-29 PROCEDURE — 85610 PROTHROMBIN TIME: CPT | Mod: QW

## 2024-10-31 ENCOUNTER — TELEPHONE (OUTPATIENT)
Dept: PHARMACY | Facility: CLINIC | Age: 54
End: 2024-10-31
Payer: MEDICARE

## 2024-10-31 ENCOUNTER — TELEPHONE (OUTPATIENT)
Dept: PRIMARY CARE | Facility: CLINIC | Age: 54
End: 2024-10-31
Payer: MEDICARE

## 2024-11-06 ENCOUNTER — PREP FOR PROCEDURE (OUTPATIENT)
Dept: UROLOGY | Facility: HOSPITAL | Age: 54
End: 2024-11-06
Payer: MEDICARE

## 2024-11-06 DIAGNOSIS — E27.8 ADRENAL MASS (MULTI): Primary | ICD-10-CM

## 2024-11-06 DIAGNOSIS — R31.21 ASYMPTOMATIC MICROSCOPIC HEMATURIA: ICD-10-CM

## 2024-11-06 RX ORDER — GABAPENTIN 300 MG/1
300 CAPSULE ORAL ONCE
OUTPATIENT
Start: 2024-11-06 | End: 2024-11-06

## 2024-11-06 RX ORDER — HEPARIN SODIUM 5000 [USP'U]/ML
5000 INJECTION, SOLUTION INTRAVENOUS; SUBCUTANEOUS ONCE
OUTPATIENT
Start: 2024-11-06 | End: 2024-11-06

## 2024-11-06 RX ORDER — CELECOXIB 400 MG/1
400 CAPSULE ORAL ONCE
OUTPATIENT
Start: 2024-11-06 | End: 2024-11-06

## 2024-11-06 RX ORDER — ACETAMINOPHEN 325 MG/1
975 TABLET ORAL ONCE
OUTPATIENT
Start: 2024-11-06 | End: 2024-11-06

## 2024-11-06 RX ORDER — CHLORHEXIDINE GLUCONATE 40 MG/ML
SOLUTION TOPICAL DAILY PRN
OUTPATIENT
Start: 2024-11-06

## 2024-11-07 ENCOUNTER — APPOINTMENT (OUTPATIENT)
Dept: UROLOGY | Facility: HOSPITAL | Age: 54
End: 2024-11-07
Payer: MEDICARE

## 2024-11-07 DIAGNOSIS — Z96.0 S/P INSERTION OF PENILE IMPLANT: Primary | ICD-10-CM

## 2024-11-07 PROCEDURE — 4010F ACE/ARB THERAPY RXD/TAKEN: CPT | Performed by: UROLOGY

## 2024-11-07 PROCEDURE — 99213 OFFICE O/P EST LOW 20 MIN: CPT | Performed by: UROLOGY

## 2024-11-07 PROCEDURE — 99417 PROLNG OP E/M EACH 15 MIN: CPT | Performed by: UROLOGY

## 2024-11-07 PROCEDURE — 3044F HG A1C LEVEL LT 7.0%: CPT | Performed by: UROLOGY

## 2024-11-07 PROCEDURE — 3061F NEG MICROALBUMINURIA REV: CPT | Performed by: UROLOGY

## 2024-11-07 PROCEDURE — 3048F LDL-C <100 MG/DL: CPT | Performed by: UROLOGY

## 2024-11-07 NOTE — PROGRESS NOTES
FUV    HISTORY OF PRESENT ILLNESS:   Noah Allen is a 54 y.o. male who is being seen today for fuv    Pt had IPP placed on 6/19/24.  Coloplast Titan IPP IP approach, size 18 cm with no rear-tip extenders. Classic pump. 125 mL reservoir in space of Retzius (left). 100 mL in the system. discharged home with drain    6/21/24 - Doing well.  No significant pain, no f/c. Scant output in drain. Drain removed    7/2/24 - Doing well    8/8/24 - Trouble cycling, no f./c    11/7/24 - Able to cycle, doing well    PAST MEDICAL HISTORY:  Past Medical History:   Diagnosis Date    Alcohol abuse     quit 12/1/22    Anemia, unspecified 06/29/2020    Mild anemia, H/H= 14/43.8 on 9/28/23    Salinas's esophagus     Buerger's disease (CMS-HCC)     managed by PCP Dr. Jain: on Coumadin, stoppage requested    Cervical radiculopathy     Cervical spondylosis     Chronic pain     CKD (chronic kidney disease)     BUN/CR= 9/0.89 with eGFR >90 on 11/21/23    Contusion of left front wall of thorax, initial encounter 10/23/2021    Contusion of left chest wall, initial encounter    Depression     Disease of spinal cord, unspecified 04/02/2018    Cervical myelopathy    DM (diabetes mellitus) (Multi)     A1C= 5.7% on 11/21/23    ED (erectile dysfunction)     Essential (primary) hypertension 09/25/2018    HTN (hypertension), benign    GERD (gastroesophageal reflux disease)     Hematuria     following with urology    HLD (hyperlipidemia)     Hyponatremia     TQ=393, 11/21/23    Lightheadedness     Long term (current) use of anticoagulants 04/04/2018    Chronic anticoagulation    Lumbar radiculopathy     Numbness     SAAD (obstructive sleep apnea)     Palpitations     Phimosis of penis     PVD (peripheral vascular disease) (CMS-[facility])     Vitamin D deficiency        PAST SURGICAL HISTORY:  Past Surgical History:   Procedure Laterality Date    CERVICAL FUSION      COLONOSCOPY      KNEE SURGERY  06/02/2015    Knee Surgery    OTHER SURGICAL HISTORY   01/26/2018    Anterior Spinal Diskectomy, Osteophytectomy Cerv Interspace    UPPER GASTROINTESTINAL ENDOSCOPY          ALLERGIES:   Allergies   Allergen Reactions    Penicillins Anaphylaxis    Sulfa (Sulfonamide Antibiotics) Anaphylaxis    Lisinopril Cough     cough        MEDICATIONS:   Current Outpatient Medications   Medication Instructions    acetaminophen (TYLENOL) 1,000 mg, oral, Every 6 hours PRN    amLODIPine (NORVASC) 10 mg, oral, Daily    aspirin 81 mg, Daily    atorvastatin (LIPITOR) 40 mg, oral, Daily    baclofen (Lioresal) 10 mg tablet 1 tablet, 2 times daily PRN    cholecalciferol (Vitamin D-3) 50 mcg (2,000 unit) capsule 1 tablet, Daily    ciclopirox (Penlac) 8 % solution Topical, Daily, Remove weekly    dexAMETHasone (DECADRON) 1 mg, oral, Once, Take 1 tablet by mouth @ 11pm before morning lab draw    docusate sodium (COLACE) 100 mg, 2 times daily    DULoxetine (CYMBALTA) 60 mg, 2 times daily    enoxaparin (LOVENOX) 80 mg, subcutaneous, Every 12 hours    ferrous sulfate 65 mg, Daily with breakfast    gabapentin (Neurontin) 300 mg capsule 3 capsules, 2 times daily    losartan (COZAAR) 100 mg, oral, Daily    losartan (COZAAR) 100 mg, oral, Daily    losartan (COZAAR) 100 mg, oral, Daily    metFORMIN XR (GLUCOPHAGE-XR) 500 mg, oral, 2 times daily    methylPREDNISolone (Medrol Dospak) 4 mg tablets Take each day's dose with one meal    omeprazole (PRILOSEC) 40 mg, oral, 2 times daily    traMADol (ULTRAM) 100 mg, 2 times daily    warfarin (COUMADIN) 7.5 mg, oral, Nightly        PHYSICAL EXAM:  There were no vitals taken for this visit.  Constitutional: Patient appears well-developed and well-nourished. No distress.    Pulmonary/Chest: Effort normal. No respiratory distress.   Abdominal: Soft, ND NT  : Tips in good position, pump midline and dependent.  Cycles well. Incision well healed  Musculoskeletal: Normal range of motion.    Neurological: Alert and oriented to person, place, and time.  Psychiatric:  Normal mood and affect. Behavior is normal. Thought content normal.      Labs  Lab Results   Component Value Date    PSA 1.00 09/05/2024     Lab Results   Component Value Date    GFRMALE >90 09/28/2023     Lab Results   Component Value Date    CREATININE 1.31 (H) 09/05/2024     Lab Results   Component Value Date    CHOL 182 09/05/2024     Lab Results   Component Value Date    HDL 44.5 09/05/2024     Lab Results   Component Value Date    CHHDL 4.1 09/05/2024     Lab Results   Component Value Date    LDLF 99 04/18/2023     Lab Results   Component Value Date    VLDL 48 (H) 09/05/2024     Lab Results   Component Value Date    TRIG 242 (H) 09/05/2024     Lab Results   Component Value Date    HGBA1C 6.0 (H) 09/05/2024       Lab Results   Component Value Date    HCT 38.8 (L) 09/05/2024       Imaging    Procedures      Assessment:      1. S/P insertion of penile implant            Noah Allen is a 54 y.o. male here for POV    Doing well  Plan:   No restrictions  Conservative voiding measures    FU in 6m, sooner if needed

## 2024-11-11 ENCOUNTER — OFFICE VISIT (OUTPATIENT)
Facility: CLINIC | Age: 54
End: 2024-11-11
Payer: MEDICARE

## 2024-11-11 VITALS
DIASTOLIC BLOOD PRESSURE: 76 MMHG | BODY MASS INDEX: 23.16 KG/M2 | WEIGHT: 171 LBS | SYSTOLIC BLOOD PRESSURE: 112 MMHG | HEIGHT: 72 IN | TEMPERATURE: 97.5 F | HEART RATE: 84 BPM

## 2024-11-11 DIAGNOSIS — M54.16 LUMBAR RADICULOPATHY: Primary | ICD-10-CM

## 2024-11-11 ASSESSMENT — PATIENT HEALTH QUESTIONNAIRE - PHQ9
7. TROUBLE CONCENTRATING ON THINGS, SUCH AS READING THE NEWSPAPER OR WATCHING TELEVISION: MORE THAN HALF THE DAYS
5. POOR APPETITE OR OVEREATING: NOT AT ALL
SUM OF ALL RESPONSES TO PHQ QUESTIONS 1-9: 10
9. THOUGHTS THAT YOU WOULD BE BETTER OFF DEAD, OR OF HURTING YOURSELF: NOT AT ALL
1. LITTLE INTEREST OR PLEASURE IN DOING THINGS: SEVERAL DAYS
4. FEELING TIRED OR HAVING LITTLE ENERGY: MORE THAN HALF THE DAYS
2. FEELING DOWN, DEPRESSED OR HOPELESS: MORE THAN HALF THE DAYS
3. TROUBLE FALLING OR STAYING ASLEEP: NEARLY EVERY DAY
10. IF YOU CHECKED OFF ANY PROBLEMS, HOW DIFFICULT HAVE THESE PROBLEMS MADE IT FOR YOU TO DO YOUR WORK, TAKE CARE OF THINGS AT HOME, OR GET ALONG WITH OTHER PEOPLE: SOMEWHAT DIFFICULT
6. FEELING BAD ABOUT YOURSELF - OR THAT YOU ARE A FAILURE OR HAVE LET YOURSELF OR YOUR FAMILY DOWN: NOT AT ALL
8. MOVING OR SPEAKING SO SLOWLY THAT OTHER PEOPLE COULD HAVE NOTICED. OR THE OPPOSITE, BEING SO FIGETY OR RESTLESS THAT YOU HAVE BEEN MOVING AROUND A LOT MORE THAN USUAL: NOT AT ALL
SUM OF ALL RESPONSES TO PHQ9 QUESTIONS 1 & 2: 3

## 2024-11-11 ASSESSMENT — PAIN SCALES - GENERAL: PAINLEVEL_OUTOF10: 9
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
## 2024-11-12 ENCOUNTER — TELEPHONE (OUTPATIENT)
Dept: PHARMACY | Facility: CLINIC | Age: 54
End: 2024-11-12
Payer: MEDICARE

## 2024-11-12 NOTE — TELEPHONE ENCOUNTER
Pt calls.  His adrenal gland procedure for 11/21 has been cancelled.  The surgeon needs more time to further discuss the situation with another one of the pt's physicians.  His inr = 2.9 on 10/29.  He wanted to push his next visit out bc he doesn't need to come in early any longer since cancellation.  He will find out the new plans/date on 11/22.  So I told him to just call us when he finds out all the info and we will re-nilton him according to that.  He originally had another inr check nilton for 11/14 (which is now cancelled)

## 2024-11-14 ENCOUNTER — APPOINTMENT (OUTPATIENT)
Dept: PHARMACY | Facility: CLINIC | Age: 54
End: 2024-11-14
Payer: MEDICARE

## 2024-11-15 ENCOUNTER — APPOINTMENT (OUTPATIENT)
Dept: NEUROSURGERY | Facility: CLINIC | Age: 54
End: 2024-11-15
Payer: MEDICARE

## 2024-11-22 ENCOUNTER — APPOINTMENT (OUTPATIENT)
Dept: UROLOGY | Facility: CLINIC | Age: 54
End: 2024-11-22
Payer: MEDICARE

## 2024-11-22 DIAGNOSIS — E27.8 ADRENAL MASS (MULTI): Primary | ICD-10-CM

## 2024-11-22 PROCEDURE — 4010F ACE/ARB THERAPY RXD/TAKEN: CPT | Performed by: STUDENT IN AN ORGANIZED HEALTH CARE EDUCATION/TRAINING PROGRAM

## 2024-11-22 PROCEDURE — 3061F NEG MICROALBUMINURIA REV: CPT | Performed by: STUDENT IN AN ORGANIZED HEALTH CARE EDUCATION/TRAINING PROGRAM

## 2024-11-22 PROCEDURE — 99442 PR PHYS/QHP TELEPHONE EVALUATION 11-20 MIN: CPT | Performed by: STUDENT IN AN ORGANIZED HEALTH CARE EDUCATION/TRAINING PROGRAM

## 2024-11-22 PROCEDURE — 3044F HG A1C LEVEL LT 7.0%: CPT | Performed by: STUDENT IN AN ORGANIZED HEALTH CARE EDUCATION/TRAINING PROGRAM

## 2024-11-22 PROCEDURE — 3048F LDL-C <100 MG/DL: CPT | Performed by: STUDENT IN AN ORGANIZED HEALTH CARE EDUCATION/TRAINING PROGRAM

## 2024-11-22 NOTE — PROGRESS NOTES
Subjective    Noah Allen is a 54 y.o. male with left adrenal mass presents today for FUV.    He was recommended a left Robotic Adrenalectomy given growth kinetics.     His was scheduled for a left Robotic Adrenalectomy that was cancelled due to a high normetanephrine and concerns for a pheochromocytoma. I spoke with Dr. Jones, endocrinology, some confirmatory test to make sure he didn't have pheochromocytoma. Blockers were not needed and his surgery was rescheduled.     CT chest abdomen pelvis showed a low-attenuation lesion involving left adrenal gland measuring up to 3.1 x 3.4 cm size, significantly increased in size since previous  exam from 07/06/2023 from 15 x 13 mm previously. The right adrenal  gland within normal limits.     He is being followed by Dr. Jones, endocrinology.       Review of Systems    All systems were reviewed. Anything negative was noted in the HPI.    Objective   Physical Exam  Gen: No acute distress      Psych: Alert and oriented x3      Neuro:  Normal ROM     Resp: Nonlabored respirations      CV: Regular rate and rhythm      Abd: S, NT, ND.     : Deferred     Skin: Warm, dry and intact without rashes      Lymphatics: No peripheral edema           Past Medical History:   Diagnosis Date    Alcohol abuse     quit 12/1/22    Anemia, unspecified 06/29/2020    Mild anemia, H/H= 14/43.8 on 9/28/23    Salinas's esophagus     Buerger's disease (CMS-HCC)     managed by PCP Dr. Jain: on Coumadin, stoppage requested    Cervical radiculopathy     Cervical spondylosis     Chronic pain     CKD (chronic kidney disease)     BUN/CR= 9/0.89 with eGFR >90 on 11/21/23    Contusion of left front wall of thorax, initial encounter 10/23/2021    Contusion of left chest wall, initial encounter    Depression     Disease of spinal cord, unspecified 04/02/2018    Cervical myelopathy    DM (diabetes mellitus) (Multi)     A1C= 5.7% on 11/21/23    ED (erectile dysfunction)     Essential (primary)  hypertension 09/25/2018    HTN (hypertension), benign    GERD (gastroesophageal reflux disease)     Hematuria     following with urology    HLD (hyperlipidemia)     Hyponatremia     NC=360, 11/21/23    Lightheadedness     Long term (current) use of anticoagulants 04/04/2018    Chronic anticoagulation    Lumbar radiculopathy     Numbness     SAAD (obstructive sleep apnea)     Palpitations     Phimosis of penis     PVD (peripheral vascular disease) (CMS-HCC)     Vitamin D deficiency          Past Surgical History:   Procedure Laterality Date    CERVICAL FUSION      COLONOSCOPY      KNEE SURGERY  06/02/2015    Knee Surgery    OTHER SURGICAL HISTORY  01/26/2018    Anterior Spinal Diskectomy, Osteophytectomy Cerv Interspace    UPPER GASTROINTESTINAL ENDOSCOPY           Assessment & Plan  Adrenal mass (Multi)  His was scheduled for a left Robotic Adrenalectomy that was cancelled due to a high normetanephrine and concerns for a pheochromocytoma. I spoke with Dr. Jones, endocrinology, some confirmatory test to make sure he didn't have pheochromocytoma. Blockers were not needed and his surgery was rescheduled.     PLAN:  left Robotic Adrenalectomy given growth kinetics rescheduled.       11 minutes spent in total on this visit.                             Scribe Attestation  By signing my name below, I, Samantha Villatoro   attest that this documentation has been prepared under the direction and in the presence of Eliseo Payan MD MPH'

## 2024-11-22 NOTE — ASSESSMENT & PLAN NOTE
His was scheduled for a left Robotic Adrenalectomy that was cancelled due to a high normetanephrine and concerns for a pheochromocytoma. I spoke with Dr. Jones, endocrinology, some confirmatory test to make sure he didn't have pheochromocytoma. Blockers were not needed and his surgery was rescheduled.     PLAN:  left Robotic Adrenalectomy given growth kinetics rescheduled.       11 minutes spent in total on this visit.

## 2024-12-04 ENCOUNTER — TELEPHONE (OUTPATIENT)
Dept: PHARMACY | Facility: CLINIC | Age: 54
End: 2024-12-04
Payer: MEDICARE

## 2024-12-04 NOTE — TELEPHONE ENCOUNTER
Called patient 12/4/24 regarding upcoming procedures.    Back injections 1/7/25  Surgery 1/9/25    Patient is to hold warfarin 5 days prior to back injections and bridge with lovenox. Patient will need lovenox prescription sent and bridging instructions made.     Scheduled patient an appointment on 12/31 @ 9:45 to have INR check prior to holding for procedure. Can review bridging instruction during this appointment.

## 2024-12-18 ENCOUNTER — TELEPHONE (OUTPATIENT)
Dept: PHARMACY | Facility: CLINIC | Age: 54
End: 2024-12-18

## 2024-12-18 ENCOUNTER — OFFICE VISIT (OUTPATIENT)
Dept: PODIATRY | Facility: CLINIC | Age: 54
End: 2024-12-18
Payer: MEDICARE

## 2024-12-18 DIAGNOSIS — M79.672 FOOT PAIN, BILATERAL: ICD-10-CM

## 2024-12-18 DIAGNOSIS — B35.1 ONYCHOMYCOSIS: Primary | ICD-10-CM

## 2024-12-18 DIAGNOSIS — I73.1 THROMBOANGIITIS OBLITERANS (BUERGER'S DISEASE): Primary | ICD-10-CM

## 2024-12-18 DIAGNOSIS — M79.671 FOOT PAIN, BILATERAL: ICD-10-CM

## 2024-12-18 PROCEDURE — 99213 OFFICE O/P EST LOW 20 MIN: CPT | Performed by: PODIATRIST

## 2024-12-18 PROCEDURE — 4010F ACE/ARB THERAPY RXD/TAKEN: CPT | Performed by: PODIATRIST

## 2024-12-18 PROCEDURE — 3048F LDL-C <100 MG/DL: CPT | Performed by: PODIATRIST

## 2024-12-18 PROCEDURE — 1036F TOBACCO NON-USER: CPT | Performed by: PODIATRIST

## 2024-12-18 PROCEDURE — 3061F NEG MICROALBUMINURIA REV: CPT | Performed by: PODIATRIST

## 2024-12-18 PROCEDURE — 3044F HG A1C LEVEL LT 7.0%: CPT | Performed by: PODIATRIST

## 2024-12-18 RX ORDER — ENOXAPARIN SODIUM 100 MG/ML
80 INJECTION SUBCUTANEOUS EVERY 12 HOURS
Qty: 12 ML | Refills: 0 | Status: SHIPPED | OUTPATIENT
Start: 2024-12-18

## 2024-12-18 NOTE — PROGRESS NOTES
Chief Complaint   Patient presents with    NAIL CARE     PT HERE FOR NAIL CARE      HPI: Patient is complaining of painful elongated fungal toenails especially the hallux.  Patient states that ingrown toenail of digits 2 and 3 and 4 of the left foot had to trim out the corners himself.  Patient again has multiple questions on fungal nail treatments.  Patient has surgery scheduled for the seventh for a mass on his adrenal gland.  Patient had to push back his back surgery which was supposed to be in January.  Patient is on warfarin for Buerger's disease.    Phyiscal Exam  Patient alert, oriented, no acute distress    VASC: +2/4 pedal pulses B/L.  CFT brisk all digits.  Skin temperature is warm to cool proximal distal bilateral foot.  No ischemic changes to the toes noted bilaterally.    NEURO: Vibratory diminished 1st MPJ B/L.  Light touch intact B/L.   5.07 Justice-Vanessa monofilament intact B/L.    DERM:Nails 1,2,3,5 L and 1,2,4,5 R are thickened, discolored, crumbly, painful with subungual debris. Pain on palpation to the nails.  Digit 2, 3, 4 left foot have recently been debrided in a slant back fashion.  Remaining toenails are elongated.  No acute paronychias noted.  No cellulitis noted.   Clear base is noted on hallux B/L, minimal amounts.  This has not changed since last visit.    MUSCULOSKEL: +5/5 muscle strength B/L.    No ROM 1st MPJ R, limited ROM 1st MPJ L  Large exostosis noted 1st MPJ R    Assessment and Plan  #1  Onychomycosis  Debrided all nails without complications  Failed topical Penlac therapy  Multiple questions asked and answered regarding fungal nail treatments  Discussed with patient that terbinafine interacts with Coumadin and he is not a candidate for this treatment at this time  Follow-up 2-3 months

## 2024-12-18 NOTE — TELEPHONE ENCOUNTER
"Back injections 1/7/25  Surgery 1/9/25     Patient is to hold warfarin 5 days prior to back injections and bridge with lovenox. Patient will need lovenox prescription sent and bridging instructions made.      Scheduled patient an appointment on 12/31 @ 9:45 to have INR check prior to holding for procedure. Can review bridging instruction during this appointment.    Enoxaparin (Lovenox) \"Bridging for Warfarin (Coumadin/Jantoven)  Enoxaparin (Lovenox) Dose and Frequency:                                   Pre-Op and Post-Op Anticoagulation Instructions            Day          Date               Instructions             -7               -6               -5 1/2/2025 Do not take Warfarin             -4 1/3/2025 Do not take Warfarin  Inject Enoxaparin (Lovenox) in the morning and evening (12 hours apart)             -3 1/4/2025 Do not take Warfarin  Inject Enoxaparin (Lovenox) in the morning and evening (12 hours apart)             -2 1/5/2025 Do not take Warfarin  Inject Enoxaparin (Lovenox) in the morning and evening (12 hours apart)             -1  DAY BEFORE INJECTION 1/6/2025 Do not take Warfarin  Only take Enoxaparin (Lovenox) dose in the morning  Do not take evening Enoxaparin (Lovenox)        DAY OF         INJECTION 1/7/2025 Do not take Enoxaparin (Lovenox) today  Do not take Warfarin            +1  DAY BEFORE SURGERY 1/8/2025 Do not take Warfarin  Only take Enoxaparin (Lovenox) dose in the morning  Do not take evening Enoxaparin (Lovenox)            +2  DAY OF SURGERY 1/9/2025 Do not take Enoxaparin (Lovenox) today  Take your usual dose of warfarin in the evening            +3 1/10/2025 Take you usual dose of warfarin  Inject Enoxaparin (Lovenox) in the morning and evening (12 hours apart)            +4 1/11/2025 Take you usual dose of warfarin  Inject Enoxaparin (Lovenox) in the morning and evening (12 hours apart)            +5 1/12/2025 Take you usual dose of warfarin  Inject Enoxaparin (Lovenox) in the " morning and evening (12 hours apart)            +6 1/13/2025 Schedule appointment with Anticoagulation Clinic to access further need for Enoxaparin (Lovenox)      Patient will need 15 Lovenox injections to be called in. Most recent patient weight 77.6 kg on 11/11/24 so will need 80 mg. Platelets were 266 and sCr was 1.31 on 9/5/24. Estimated CrCl is 69 ml/min.     Prescription for Lovenox e-scribed to [facility].

## 2024-12-20 ENCOUNTER — APPOINTMENT (OUTPATIENT)
Dept: UROLOGY | Facility: CLINIC | Age: 54
End: 2024-12-20
Payer: MEDICARE

## 2024-12-26 ENCOUNTER — TELEPHONE (OUTPATIENT)
Dept: PRIMARY CARE | Facility: CLINIC | Age: 54
End: 2024-12-26
Payer: MEDICARE

## 2024-12-26 NOTE — TELEPHONE ENCOUNTER
"Pt called at last appt you told he he didn't need scan, now My Chart sent him notice that he is behind on having scan done. \"Do I need the scan or not?\"  "

## 2024-12-30 NOTE — TELEPHONE ENCOUNTER
He had a CT of his chest in August, so he is OK for now. We can discuss it at his next visit. Thank you.

## 2024-12-31 ENCOUNTER — ANTICOAGULATION - WARFARIN VISIT (OUTPATIENT)
Dept: PHARMACY | Facility: CLINIC | Age: 54
End: 2024-12-31
Payer: MEDICARE

## 2024-12-31 ENCOUNTER — APPOINTMENT (OUTPATIENT)
Dept: PREADMISSION TESTING | Facility: HOSPITAL | Age: 54
End: 2024-12-31
Payer: MEDICARE

## 2024-12-31 DIAGNOSIS — I73.1 THROMBOANGIITIS OBLITERANS (BUERGER'S DISEASE): Primary | ICD-10-CM

## 2024-12-31 LAB
POC INR: 4.9
POC PROTHROMBIN TIME: NORMAL

## 2024-12-31 PROCEDURE — 85610 PROTHROMBIN TIME: CPT | Mod: QW

## 2024-12-31 PROCEDURE — 99212 OFFICE O/P EST SF 10 MIN: CPT

## 2024-12-31 NOTE — PROGRESS NOTES
Coumadin Clinic Visit Note    Patient verified warfarin dose  No missed doses  No unusual bruising or bleeding  No changes to medications  No recent otc pain meds/alcohol use  No recent illness/vomiting/diarrhea  Consistent dietary green intake  Patient has 2 upcoming procedures in January see previous notes  INR Supratherapeutic today at 4.9  No warfarin today and tomorrow  Back on 1/2/25 to make sure INR has decreased  Patient to start lovenox bridging on 1/2/25 so need to make sure INR has come down prior to starting injections  Next appointment in 2 days  Please give Lovenox bridging sheet to patient on 1/2/25 if and update if any changes to instructions    Zofia Orellana, Pharm D

## 2024-12-31 NOTE — CPM/PAT H&P
CPM/PAT Evaluation       Name: Noah Allen (Noah Allen)  /Age: 1970/54 y.o.     { PAT Visit Type:06744}      Chief Complaint: ***    HPI  Noah Allen is scheduled for Adrenalectomy Robot-Assisted- Left with Dr. Payan on 25.  Past Medical History:   Diagnosis Date    Adrenal mass (Multi)     Alcohol abuse     quit 20    Anemia, unspecified     Iron deficiency anemia    Salinas's esophagus     On PPI, last EGD in 2023, next due 2026    Buerger's disease (CMS-HCC)     managed by PCP Dr. Jain: on Coumadin    Cervical radiculopathy     Cervical spondylosis     Chronic pain     CKD (chronic kidney disease)     CKD stage 3, follows with nephrology    Contusion of left front wall of thorax, initial encounter 10/23/2021    Contusion of left chest wall, initial encounter    Depression     Disease of spinal cord, unspecified 2018    Cervical myelopathy    DM (diabetes mellitus) (Multi)     A1C 6.0 on 24    ED (erectile dysfunction)     Essential (primary) hypertension 2018    HTN (hypertension), benign    Former smoker     GERD (gastroesophageal reflux disease)     Hematuria     microscopic hematuria    HLD (hyperlipidemia)     Hyponatremia     Lightheadedness     Long term (current) use of anticoagulants 2018    Chronic anticoagulation    Lumbar radiculopathy     Numbness     SAAD (obstructive sleep apnea)     Palpitations     Phimosis of penis     PVD (peripheral vascular disease) (CMS-HCC)     Vitamin D deficiency        Past Surgical History:   Procedure Laterality Date    CERVICAL FUSION      COLONOSCOPY      KNEE SURGERY  2015    Knee Surgery    OTHER SURGICAL HISTORY  2018    Anterior Spinal Diskectomy, Osteophytectomy Cerv Interspace    PENILE PROSTHESIS IMPLANT  2024    UPPER GASTROINTESTINAL ENDOSCOPY         Patient  has no history on file for sexual activity.    Family History   Problem Relation Name Age of Onset    Other  (cerebral infarction) Mother      Other (htn) Mother      Diabetes Father      Other (htn) Father         Allergies   Allergen Reactions    Penicillins Anaphylaxis    Sulfa (Sulfonamide Antibiotics) Anaphylaxis    Lisinopril Cough     cough       Prior to Admission medications    Medication Sig Start Date End Date Taking? Authorizing Provider   acetaminophen (Tylenol) 500 mg tablet Take 2 tablets (1,000 mg) by mouth every 6 hours if needed for mild pain (1 - 3).  Patient not taking: Reported on 11/11/2024 6/19/24   Malik Parra MD   amLODIPine (Norvasc) 10 mg tablet TAKE 1 TABLET BY MOUTH ONCE A DAY 8/21/24   Braulio Jain MD   aspirin 81 mg EC tablet Take 1 tablet (81 mg) by mouth once daily. 1/19/17   Historical Provider, MD   atorvastatin (Lipitor) 40 mg tablet Take 1 tablet (40 mg) by mouth once daily. 9/26/24   Braulio Jain MD   baclofen (Lioresal) 10 mg tablet Take 1 tablet (10 mg) by mouth 2 times a day as needed. 12/4/21   Historical Provider, MD   cholecalciferol (Vitamin D-3) 50 mcg (2,000 unit) capsule Take 1 tablet by mouth early in the morning.. 7/24/20   Historical Provider, MD   ciclopirox (Penlac) 8 % solution APPLY TOPICALLY ONCE DAILY. REMOVE WEEKLY  Patient not taking: Reported on 11/11/2024 2/28/24   ESTEBAN ReeceM   dexAMETHasone (Decadron) 1 mg tablet Take 1 tablet (1 mg) by mouth 1 time for 1 dose. Take 1 tablet by mouth @ 11pm before morning lab draw 10/4/24 10/4/24  Eliseo Payan MD MPH   docusate sodium (Colace) 100 mg capsule Take 1 capsule (100 mg) by mouth 2 times a day.    Historical Provider, MD   DULoxetine (Cymbalta) 60 mg DR capsule Take 1 capsule (60 mg) by mouth 2 times a day.    Historical Provider, MD   enoxaparin (Lovenox) 80 mg/0.8 mL syringe Inject 0.8 mL (80 mg) under the skin every 12 hours.  Patient not taking: Reported on 11/11/2024 10/4/24   Braulio Jain MD   enoxaparin (Lovenox) 80 mg/0.8 mL syringe Inject 0.8 mL (80 mg) under the skin every 12 hours.  12/18/24   Braulio Jain MD   ferrous sulfate 325 (65 Fe) MG EC tablet Take 1 tablet (65 mg) by mouth once daily with a meal. 7/24/20   Historical Provider, MD   gabapentin (Neurontin) 300 mg capsule Take 3 capsules (900 mg) by mouth 2 times a day. Take as directed    Historical Provider, MD   losartan (Cozaar) 100 mg tablet TAKE 1 TABLET BY MOUTH EVERY DAY  Patient not taking: Reported on 11/11/2024 3/28/24   Braulio Jain MD   losartan (Cozaar) 100 mg tablet Take 1 tablet (100 mg) by mouth once daily. 3/28/24 9/24/24  Braulio Jain MD   losartan (Cozaar) 100 mg tablet TAKE 1 TABLET BY MOUTH ONCE A DAY 8/21/24   Braulio Jain MD   metFORMIN  mg 24 hr tablet Take 1 tablet (500 mg) by mouth 2 times a day. 9/26/24   Braulio Jain MD   methylPREDNISolone (Medrol Dospak) 4 mg tablets Take each day's dose with one meal  Patient not taking: Reported on 11/11/2024 9/13/24   Soumya Cates, APRN-CNP   omeprazole (PriLOSEC) 40 mg DR capsule Take 1 capsule (40 mg) by mouth 2 times a day. 9/26/24   Braulio Jain MD   traMADol (Ultram) 50 mg tablet Take 2 tablets (100 mg) by mouth 2 times a day.    Historical Provider, MD   warfarin (Coumadin) 7.5 mg tablet Take 1 tablet (7.5 mg) by mouth once daily at bedtime. 9/26/24   Braulio Jain MD        PAT ROS     PAT Physical Exam     Airway    Testing/Diagnostic:   CT CHEST ABDOMEN PELVIS W IV CONTRAST; 8/10/2024   IMPRESSION:  CHEST  1.  Left pleural base thickening and trace amount of left pleural  effusion with associated atelectasis or infiltrate. Recommend  clinical correlation and follow-up to document resolution.  2. Multiple healed left rib fractures. No evidence for displaced  acute rib fracture or pneumothorax.  3. No definite evidence for acute intrathoracic injury is seen.  4. Additional detailed findings as above.      ABDOMEN - PELVIS  1.  No CT evidence for acute intra-abdominal or intrapelvic injury.  2. Additional detailed findings as above.    ECG;  6/12/24  Normal sinus rhythm  Possible Anterior infarct (cited on or before 28-SEP-2023)  Abnormal ECG  When compared with ECG of 28-SEP-2023 13:29    Transthoracic Echo; 12/3/13    CONCLUSIONS:   - Exam indication: R/o clot   - The left ventricle is normal in size. There is moderate concentric left   ventricular hypertrophy. Left ventricular systolic function is normal. EF = 56 ±   5% (2D biplane) Definity contrast used for endocardial border detection. Baseline   left ventricular diastolic function is consistent with abnormal relaxation (stage   1).   - The right ventricle is normal in size. Right ventricular systolic function is   normal.   - There are no significant valvular abnormalities.   - No prior echocardiographic exam available for comparison.       Patient Specialist/PCP:   PCP: Braulio Jain MD LOV 9/26/24 P: [phone], F: [phone]  Requested pre-op Warfarin instructions from PCP Dr. Jain via staff message on 12/31/24.    Urology: Eliseo Payan MD LOV 11/22/24 fuv for left adrenal mass. He was recommended a left Robotic Adrenalectomy given growth kinetics.     Neurosurgery: Marcos Nieves MD LOV 11/11/24 presents to the spine clinic in follow up with bilateral lumbar radiculopathy. He continues with lumbar radiculopathy with numbness and tingling in the legs. I encouraged him to get an injection in the mean time and he will let us know what his neurologist says regarding his adrenal mass. I discussed an L3-S1 lumbar laminectomy and L5-S1 lumbar fusion. This would occur approximately 3-6 months after his removal of the adrenal mass when he is completely healed from it     Urology: Nitish Mcdonald MD LOV 11/7/24 fuv s/p IPP placed on 6/19/24.    Endocrinology: Jeremiah Jnoes MD at [facility] P: [phone], F: [phone]  Nephrology: Dona Shaffer MD at [facility] P: [phone]  Visit Vitals  Smoking Status Former       DASI Risk Score      Flowsheet Row Questionnaire  Series Submission from 12/26/2024 in Virtual Care with Generic Provider Dameon   Can you take care of yourself (eat, dress, bathe, or use toilet)?  2.75  filed at 12/26/2024 1420   Can you do yard work like raking leaves, weeding or pushing a mower? 4.5  filed at 12/26/2024 1420          Caprini DVT Assessment    No data to display       Modified Frailty Index    No data to display       CHADS2 Stroke Risk  Current as of 29 minutes ago        N/A 3 to 100%: High Risk   2 to < 3%: Medium Risk   0 to < 2%: Low Risk     Last Change: N/A          This score determines the patient's risk of having a stroke if the patient has atrial fibrillation.        This score is not applicable to this patient. Components are not calculated.          Revised Cardiac Risk Index    No data to display       Apfel Simplified Score    No data to display       Risk Analysis Index Results This Encounter    No data found in the last 10 encounters.       Prodigy: High Risk  Total Score: 11              Prodigy Gender Score     Prodigy Previous Opioid Use Score           ARISCAT Score for Postoperative Pulmonary Complications    No data to display       Wright Perioperative Risk for Myocardial Infarction or Cardiac Arrest (MARIN)    No data to display         Assessment and Plan:   Requested pre-op Warfarin instructions from PCP Dr. Jain via staff message on 12/31/24.  Unable to reach the patient. LVM  ONLY    Francesca Chaney RN  Pre-Admission Testing   {[facility] EMBEDDED ASSESSMENT AND PLAN:88918}

## 2025-01-02 ENCOUNTER — APPOINTMENT (OUTPATIENT)
Dept: PHARMACY | Facility: CLINIC | Age: 55
End: 2025-01-02
Payer: MEDICARE

## 2025-01-02 ENCOUNTER — HOSPITAL ENCOUNTER (OUTPATIENT)
Dept: CARDIOLOGY | Facility: HOSPITAL | Age: 55
Discharge: HOME | End: 2025-01-02
Payer: MEDICARE

## 2025-01-02 ENCOUNTER — ANTICOAGULATION - WARFARIN VISIT (OUTPATIENT)
Dept: PHARMACY | Facility: CLINIC | Age: 55
End: 2025-01-02
Payer: MEDICARE

## 2025-01-02 ENCOUNTER — PRE-ADMISSION TESTING (OUTPATIENT)
Dept: PREADMISSION TESTING | Facility: HOSPITAL | Age: 55
End: 2025-01-02
Payer: MEDICARE

## 2025-01-02 ENCOUNTER — ANESTHESIA EVENT (OUTPATIENT)
Dept: OPERATING ROOM | Facility: HOSPITAL | Age: 55
End: 2025-01-02
Payer: MEDICARE

## 2025-01-02 VITALS
TEMPERATURE: 98.1 F | HEIGHT: 72 IN | BODY MASS INDEX: 23.4 KG/M2 | WEIGHT: 172.8 LBS | HEART RATE: 80 BPM | DIASTOLIC BLOOD PRESSURE: 78 MMHG | OXYGEN SATURATION: 100 % | SYSTOLIC BLOOD PRESSURE: 116 MMHG

## 2025-01-02 DIAGNOSIS — Z41.9 SURGERY, ELECTIVE: Primary | ICD-10-CM

## 2025-01-02 DIAGNOSIS — E27.8 ADRENAL MASS (MULTI): ICD-10-CM

## 2025-01-02 DIAGNOSIS — I73.1 THROMBOANGIITIS OBLITERANS (BUERGER'S DISEASE): Primary | ICD-10-CM

## 2025-01-02 DIAGNOSIS — R31.21 ASYMPTOMATIC MICROSCOPIC HEMATURIA: ICD-10-CM

## 2025-01-02 DIAGNOSIS — Z41.9 SURGERY, ELECTIVE: ICD-10-CM

## 2025-01-02 LAB
ABO GROUP (TYPE) IN BLOOD: NORMAL
ANION GAP SERPL CALC-SCNC: 14 MMOL/L (ref 10–20)
ANTIBODY SCREEN: NORMAL
APPEARANCE UR: CLEAR
APTT PPP: 42 SECONDS (ref 27–38)
BILIRUB UR STRIP.AUTO-MCNC: NEGATIVE MG/DL
BUN SERPL-MCNC: 15 MG/DL (ref 6–23)
CALCIUM SERPL-MCNC: 9.7 MG/DL (ref 8.6–10.6)
CHLORIDE SERPL-SCNC: 102 MMOL/L (ref 98–107)
CO2 SERPL-SCNC: 29 MMOL/L (ref 21–32)
COLOR UR: YELLOW
CREAT SERPL-MCNC: 1.42 MG/DL (ref 0.5–1.3)
EGFRCR SERPLBLD CKD-EPI 2021: 58 ML/MIN/1.73M*2
ERYTHROCYTE [DISTWIDTH] IN BLOOD BY AUTOMATED COUNT: 13.5 % (ref 11.5–14.5)
GLUCOSE SERPL-MCNC: 56 MG/DL (ref 74–99)
GLUCOSE UR STRIP.AUTO-MCNC: NORMAL MG/DL
HCT VFR BLD AUTO: 39.1 % (ref 41–52)
HGB BLD-MCNC: 12.9 G/DL (ref 13.5–17.5)
INR PPP: 1.9 (ref 0.9–1.1)
KETONES UR STRIP.AUTO-MCNC: NEGATIVE MG/DL
LEUKOCYTE ESTERASE UR QL STRIP.AUTO: NEGATIVE
MCH RBC QN AUTO: 27.5 PG (ref 26–34)
MCHC RBC AUTO-ENTMCNC: 33 G/DL (ref 32–36)
MCV RBC AUTO: 83 FL (ref 80–100)
NITRITE UR QL STRIP.AUTO: NEGATIVE
NRBC BLD-RTO: 0 /100 WBCS (ref 0–0)
PH UR STRIP.AUTO: 6 [PH]
PLATELET # BLD AUTO: 248 X10*3/UL (ref 150–450)
POC INR: 2.2
POC PROTHROMBIN TIME: NORMAL
POTASSIUM SERPL-SCNC: 4.7 MMOL/L (ref 3.5–5.3)
PROT UR STRIP.AUTO-MCNC: NEGATIVE MG/DL
PROTHROMBIN TIME: 21.3 SECONDS (ref 9.8–12.8)
RBC # BLD AUTO: 4.69 X10*6/UL (ref 4.5–5.9)
RBC # UR STRIP.AUTO: NEGATIVE /UL
RH FACTOR (ANTIGEN D): NORMAL
SODIUM SERPL-SCNC: 140 MMOL/L (ref 136–145)
SP GR UR STRIP.AUTO: 1.01
UROBILINOGEN UR STRIP.AUTO-MCNC: NORMAL MG/DL
WBC # BLD AUTO: 7.9 X10*3/UL (ref 4.4–11.3)

## 2025-01-02 PROCEDURE — 85027 COMPLETE CBC AUTOMATED: CPT

## 2025-01-02 PROCEDURE — 86901 BLOOD TYPING SEROLOGIC RH(D): CPT

## 2025-01-02 PROCEDURE — 81003 URINALYSIS AUTO W/O SCOPE: CPT

## 2025-01-02 PROCEDURE — 93010 ELECTROCARDIOGRAM REPORT: CPT | Performed by: INTERNAL MEDICINE

## 2025-01-02 PROCEDURE — 99212 OFFICE O/P EST SF 10 MIN: CPT

## 2025-01-02 PROCEDURE — 36415 COLL VENOUS BLD VENIPUNCTURE: CPT

## 2025-01-02 PROCEDURE — 82374 ASSAY BLOOD CARBON DIOXIDE: CPT

## 2025-01-02 PROCEDURE — 93005 ELECTROCARDIOGRAM TRACING: CPT

## 2025-01-02 PROCEDURE — 85610 PROTHROMBIN TIME: CPT

## 2025-01-02 PROCEDURE — 99205 OFFICE O/P NEW HI 60 MIN: CPT | Performed by: ANESTHESIOLOGY

## 2025-01-02 PROCEDURE — 87081 CULTURE SCREEN ONLY: CPT

## 2025-01-02 PROCEDURE — 85610 PROTHROMBIN TIME: CPT | Mod: QW

## 2025-01-02 RX ORDER — CHLORHEXIDINE GLUCONATE 40 MG/ML
1 SOLUTION TOPICAL DAILY
Qty: 25 ML | Refills: 0 | Status: SHIPPED | OUTPATIENT
Start: 2025-01-02 | End: 2025-01-10 | Stop reason: HOSPADM

## 2025-01-02 RX ORDER — CHLORHEXIDINE GLUCONATE ORAL RINSE 1.2 MG/ML
15 SOLUTION DENTAL DAILY
Qty: 30 ML | Refills: 0 | Status: SHIPPED | OUTPATIENT
Start: 2025-01-02 | End: 2025-01-10 | Stop reason: HOSPADM

## 2025-01-02 ASSESSMENT — DUKE ACTIVITY SCORE INDEX (DASI)
CAN YOU PARTICIPATE IN STRENOUS SPORTS LIKE SWIMMING, SINGLES TENNIS, FOOTBALL, BASKETBALL, OR SKIING: NO
CAN YOU DO MODERATE WORK AROUND THE HOUSE LIKE VACUUMING, SWEEPING FLOORS OR CARRYING GROCERIES: YES
CAN YOU RUN A SHORT DISTANCE: NO
CAN YOU WALK INDOORS, SUCH AS AROUND YOUR HOUSE: YES
CAN YOU HAVE SEXUAL RELATIONS: YES
CAN YOU PARTICIPATE IN MODERATE RECREATIONAL ACTIVITIES LIKE GOLF, BOWLING, DANCING, DOUBLES TENNIS OR THROWING A BASEBALL OR FOOTBALL: NO
CAN YOU DO YARD WORK LIKE RAKING LEAVES, WEEDING OR PUSHING A MOWER: YES
CAN YOU WALK A BLOCK OR TWO ON LEVEL GROUND: YES
CAN YOU DO HEAVY WORK AROUND THE HOUSE LIKE SCRUBBING FLOORS OR LIFTING AND MOVING HEAVY FURNITURE: NO
CAN YOU DO LIGHT WORK AROUND THE HOUSE LIKE DUSTING OR WASHING DISHES: YES
CAN YOU CLIMB A FLIGHT OF STAIRS OR WALK UP A HILL: YES

## 2025-01-02 ASSESSMENT — ENCOUNTER SYMPTOMS
NEUROLOGICAL NEGATIVE: 1
GASTROINTESTINAL NEGATIVE: 1
EYES NEGATIVE: 1
MUSCULOSKELETAL NEGATIVE: 1
NECK NEGATIVE: 1
CARDIOVASCULAR NEGATIVE: 1
ENDOCRINE NEGATIVE: 1
CONSTITUTIONAL NEGATIVE: 1
RESPIRATORY NEGATIVE: 1

## 2025-01-02 ASSESSMENT — PAIN SCALES - GENERAL: PAINLEVEL_OUTOF10: 5 - MODERATE PAIN

## 2025-01-02 ASSESSMENT — PAIN - FUNCTIONAL ASSESSMENT: PAIN_FUNCTIONAL_ASSESSMENT: 0-10

## 2025-01-02 NOTE — CPM/PAT H&P
CPM/PAT Evaluation   Name: Noah Allen (Noah Allen)  /Age: 1970/54 y.o.     In-Person       Chief Complaint: Surgery    HPI  55 y/o M scheduled for Left robotic Adrenalectomy on 25 with Dr. Payan. PMHX includes adrenal mass, lumbar radiculopathy, GERD, SAAD, HTN, HLD, DM, depression, PVD on Warfarin.  Presents to CPM today for perioperative risk stratification and optimization.    Past Medical History:   Diagnosis Date    Adrenal mass (Multi)     Alcohol abuse     quit 20    Anemia, unspecified     Iron deficiency anemia    Salinas's esophagus     On PPI, last EGD in 2023, next due 2026    Buerger's disease (CMS-HCC)     managed by PCP Dr. Jain: on Coumadin    Cervical radiculopathy     Cervical spondylosis     Chronic pain     CKD (chronic kidney disease)     CKD stage 3, follows with nephrology    Contusion of left front wall of thorax, initial encounter 10/23/2021    Contusion of left chest wall, initial encounter    Depression     Disease of spinal cord, unspecified 2018    Cervical myelopathy    DM (diabetes mellitus) (Multi)     A1C 6.0 on 24    ED (erectile dysfunction)     Essential (primary) hypertension 2018    HTN (hypertension), benign    Former smoker     GERD (gastroesophageal reflux disease)     Hematuria     microscopic hematuria    HLD (hyperlipidemia)     Hyponatremia     Lightheadedness     Long term (current) use of anticoagulants 2018    Chronic anticoagulation    Lumbar radiculopathy     Numbness     SAAD (obstructive sleep apnea)     Palpitations     Phimosis of penis     PVD (peripheral vascular disease) (CMS-[facility])     Vitamin D deficiency      Past Surgical History:   Procedure Laterality Date    CERVICAL FUSION      COLONOSCOPY      KNEE SURGERY  2015    Knee Surgery    OTHER SURGICAL HISTORY  2018    Anterior Spinal Diskectomy, Osteophytectomy Cerv Interspace    PENILE PROSTHESIS IMPLANT  2024    UPPER  GASTROINTESTINAL ENDOSCOPY         Patient  has no history on file for sexual activity.    Family History   Problem Relation Name Age of Onset    Other (cerebral infarction) Mother      Other (htn) Mother      Diabetes Father      Other (htn) Father         Allergies   Allergen Reactions    Penicillins Anaphylaxis    Sulfa (Sulfonamide Antibiotics) Anaphylaxis    Lisinopril Cough     cough       Prior to Admission medications    Medication Sig Start Date End Date Taking? Authorizing Provider   acetaminophen (Tylenol) 500 mg tablet Take 2 tablets (1,000 mg) by mouth every 6 hours if needed for mild pain (1 - 3).  Patient not taking: Reported on 11/11/2024 6/19/24   Malik Parra MD   amLODIPine (Norvasc) 10 mg tablet TAKE 1 TABLET BY MOUTH ONCE A DAY 8/21/24   Braulio Jain MD   aspirin 81 mg EC tablet Take 1 tablet (81 mg) by mouth once daily. 1/19/17   Historical Provider, MD   atorvastatin (Lipitor) 40 mg tablet Take 1 tablet (40 mg) by mouth once daily. 9/26/24   Braulio Jain MD   baclofen (Lioresal) 10 mg tablet Take 1 tablet (10 mg) by mouth 2 times a day as needed. 12/4/21   Historical Provider, MD   cholecalciferol (Vitamin D-3) 50 mcg (2,000 unit) capsule Take 1 tablet by mouth early in the morning.. 7/24/20   Historical Provider, MD   ciclopirox (Penlac) 8 % solution APPLY TOPICALLY ONCE DAILY. REMOVE WEEKLY  Patient not taking: Reported on 11/11/2024 2/28/24   Sarah Whitley DPM   dexAMETHasone (Decadron) 1 mg tablet Take 1 tablet (1 mg) by mouth 1 time for 1 dose. Take 1 tablet by mouth @ 11pm before morning lab draw 10/4/24 10/4/24  Eliseo Payan MD MPH   docusate sodium (Colace) 100 mg capsule Take 1 capsule (100 mg) by mouth 2 times a day.    Historical Provider, MD   DULoxetine (Cymbalta) 60 mg DR capsule Take 1 capsule (60 mg) by mouth 2 times a day.    Historical Provider, MD   enoxaparin (Lovenox) 80 mg/0.8 mL syringe Inject 0.8 mL (80 mg) under the skin every 12 hours.  Patient not  taking: Reported on 11/11/2024 10/4/24   Braulio Jain MD   enoxaparin (Lovenox) 80 mg/0.8 mL syringe Inject 0.8 mL (80 mg) under the skin every 12 hours. 12/18/24   Braulio Jain MD   ferrous sulfate 325 (65 Fe) MG EC tablet Take 1 tablet (65 mg) by mouth once daily with a meal. 7/24/20   Historical Provider, MD   gabapentin (Neurontin) 300 mg capsule Take 3 capsules (900 mg) by mouth 2 times a day. Take as directed    Historical Provider, MD   losartan (Cozaar) 100 mg tablet TAKE 1 TABLET BY MOUTH EVERY DAY  Patient not taking: Reported on 11/11/2024 3/28/24   Braulio Jain MD   losartan (Cozaar) 100 mg tablet Take 1 tablet (100 mg) by mouth once daily. 3/28/24 9/24/24  Braulio Jain MD   losartan (Cozaar) 100 mg tablet TAKE 1 TABLET BY MOUTH ONCE A DAY 8/21/24   Braulio Jain MD   metFORMIN  mg 24 hr tablet Take 1 tablet (500 mg) by mouth 2 times a day. 9/26/24   Braulio Jain MD   methylPREDNISolone (Medrol Dospak) 4 mg tablets Take each day's dose with one meal  Patient not taking: Reported on 11/11/2024 9/13/24   Soumya Cates APRN-CNP   omeprazole (PriLOSEC) 40 mg DR capsule Take 1 capsule (40 mg) by mouth 2 times a day. 9/26/24   Braulio Jain MD   traMADol (Ultram) 50 mg tablet Take 2 tablets (100 mg) by mouth 2 times a day.    Historical Provider, MD   warfarin (Coumadin) 7.5 mg tablet Take 1 tablet (7.5 mg) by mouth once daily at bedtime. 9/26/24   Braulio Jain MD        PAT ROS:   Constitutional:   neg    Neuro/Psych:   neg    Eyes:   neg    Ears:   neg    Nose:   neg    Mouth:   neg    Throat:   neg    Neck:   neg    Cardio:   neg    Respiratory:   neg    Endocrine:   neg    GI:   neg    :   neg    Musculoskeletal:   neg    Hematologic:   neg    Skin:  neg        PAT Physical Exam   General: Laying in bed. NAD.   Eyes: EOMI  ENMT: no apparent injury, no lesions seen, MMM  Head/neck: NCAT  Cardiac: regular rate in chart  Pulm: normal respiratory effort  GI: soft, NT/ND, no masses  palpated  Msk: MARTINEZ  Extremities: normal extremities  Skin: warm, dry, no lesions noted  Neuro: AOx3  Psych: appropriate mood and behavior    PAT AIRWAY:   Airway:     Mallampati::  II    TM distance::  >3 FB    Neck ROM::  Limited    Vitals:    01/02/25 1311   BP: 116/78   Pulse: 80   Temp: 36.7 °C (98.1 °F)   SpO2: 100%       Visit Vitals  Smoking Status Former       DASI Risk Score      Flowsheet Row Questionnaire Series Submission from 12/26/2024 in [facility] with Generic Provider Dameon   Can you take care of yourself (eat, dress, bathe, or use toilet)?  2.75  filed at 12/26/2024 1420   Can you do yard work like raking leaves, weeding or pushing a mower? 4.5  filed at 12/26/2024 1420          Caprini DVT Assessment    No data to display       Modified Frailty Index    No data to display       CHADS2 Stroke Risk  Current as of 2 days ago (Tuesday)        N/A 3 to 100%: High Risk   2 to < 3%: Medium Risk   0 to < 2%: Low Risk     Last Change: N/A          This score determines the patient's risk of having a stroke if the patient has atrial fibrillation.        This score is not applicable to this patient. Components are not calculated.          Revised Cardiac Risk Index    No data to display       Apfel Simplified Score    No data to display       Risk Analysis Index Results This Encounter    No data found in the last 10 encounters.       Prodigy: High Risk  Total Score: 11              Prodigy Gender Score     Prodigy Previous Opioid Use Score           ARISCAT Score for Postoperative Pulmonary Complications    No data to display       Wright Perioperative Risk for Myocardial Infarction or Cardiac Arrest (MARIN)    No data to display         No results found. However, due to the size of the patient record, not all encounters were searched. Please check Results Review for a complete set of results.     Assessment and Plan:  53 y/o M scheduled for Left robotic Adrenalectomy on 1/9/25 with Dr. Payan. PMX  includes adrenal mass, lumbar radiculopathy, GERD, SAAD, HTN, HLD, DM, depression, PVD on Warfarin.  Presents to CPM today for perioperative risk stratification and optimization.    Neuro:  Depression - managed with Duloxetine  Cervical stenosis - s/p multiple cervical spine surgeries, now has tremors on bacophen    Lumbar radiculopathy:  Pending L3-S1 lumbar laminectomy and L5-S1 lumbar fusion approximately 3-6 months after his removal of the adrenal mass per Dr. Nieves 11/11/24.    MRI lateral recess disease L2-3, L3-4, L4-5 with moderate to severe foraminal stenosis and severe degenerative disc disease L3-S1 and grade 1 spondylolisthesis at L5-S1.     Patient is at increased risk for perioperative CVA secondary to  perioperative interruption of anticoagulant, HTN, DM, operative time > 2.5 hours    HEENT:  No HEENT diagnosis or significant findings on chart review or clinical presentation and evaluation. No further preoperative testing/intervention indicated at this time.    Cardiovascular:  HTN, HLD  Amlodipine, atorvostatin, aspirin, Losartan (will hold prior)    Carotid US: Feb 2023  IMPRESSION:  Mild calcified plaque in each carotid bulb and proximal ICA resulting  in no greater than 50% diameter stenosis.    METS: 6.5  RCRI: 1 point, 6.0% risk for postoperative MACE   MARIN: 1.33% risk for 30 day postoperative MACE  EKG - today 1/2/25 - NSR 74 bpm    Pulmonary:  SAAD - not on CPAP, instructed to follow up with PCP.   Stop Bang score is 4 placing patient at high risk for SAAD  ARISCAT: 26-44 points, 13.3% risk of in-hospital postoperative pulmonary complication  PRODIGY: Moderate risk for opioid induced respiratory depression    Renal:   CKDIII - baseline Cr. 1.31, follows with PCP  Risk factors:  HTN, PAD, renal insufficiency   Pt at High risk for perioperative JENNIFER based on Dynamic Predictive Scoring Tool for Perioperative JENNIFER     Endocrine:  Adrenal mass  DM - managed with metformin  A1c 6.0%, Sept 2024    He was  recommended a left Robotic Adrenalectomy given growth kinetics.      His was scheduled for a left Robotic Adrenalectomy that was cancelled due to a high normetanephrine and concerns for a pheochromocytoma. Blockers were not needed and his surgery was rescheduled.      CT chest abdomen pelvis showed a low-attenuation lesion involving left adrenal gland measuring up to 3.1 x 3.4 cm size, significantly increased in size since previous  exam from 07/06/2023 from 15 x 13 mm previously. The right adrenal  gland within normal limits.     He is being followed by Dr. Jones, endocrinology.   Dr. Blankenship and Dr. Jones have spoken confirming this is not a pheochromocytoma and have agreed to proceed with surgery without pharmacologic blockade.     Will obtain records from Dr. Jones.    Hematologic:  Buergers disease-patient is on Coumadin     INR Supratherapeutic 12/31/24 at 4.9  No warfarin for 2 days and then  Back on 1/2/25 to make sure INR has decreased.    Warfarin to lovenox bridging started and given instructions.      Caprini Score 6, patient at Moderate risk for perioperative DVT.  Patient provided with VTE education/handout.    Gastrointestinal:   Barretts esophagus/GERD - omeprazole  Eat-10 score 1  Apfel 0    Infectious disease:   No infectious diagnosis or significant findings on chart review or clinical presentation and evaluation.   Prescription provided for CHG body wash and dental rinse. CHG use instructions reviewed and provided to patient.  Staph screen collected    Musculoskeletal:   No diagnosis or significant findings on chart review or clinical presentation and evaluation.     Anesthesia/Airway:  No anesthesia complications    Medication instructions and NPO guidelines reviewed with the patient.  All questions or concerns discussed and addressed.      Patient seen and staffed with Dr. Gaines.    Joaquín Roa DO  Anesthesiology PGY-3

## 2025-01-02 NOTE — PROGRESS NOTES
"No bleeding or unusual bruising.  Medications and doses verified.  Patient has 2 procedures scheduled in January - injection on 1/7/25 and surgery on 1/9/25.   INR=2.2   Plan: Follow Enoxaparin bridging schedule. And continue eating greens the next few days.  Follow up INR check within 1 week after surgery.    Enoxaparin (Lovenox) \"Bridging for Warfarin (Coumadin/Jantoven)       Enoxaparin(Lovenox) Dose and Frequency:  80 mg injection every 12 hours                                 Pre-Op and Post-Op Anticoagulation Instructions            Day          Date               Instructions             -7                 -6                 -5 1/2/2025 Do not take Warfarin             -4 1/3/2025 Do not take Warfarin  Inject Enoxaparin (Lovenox) in the morning and evening (12 hours apart)             -3 1/4/2025 Do not take Warfarin  Inject Enoxaparin (Lovenox) in the morning and evening (12 hours apart)             -2 1/5/2025 Do not take Warfarin  Inject Enoxaparin (Lovenox) in the morning and evening (12 hours apart)             -1  DAY BEFORE INJECTION 1/6/2025 Do not take Warfarin  Only take Enoxaparin (Lovenox) dose in the morning  Do not take evening Enoxaparin (Lovenox)        DAY OF         INJECTION 1/7/2025 Do not take Enoxaparin (Lovenox) today  Do not take Warfarin            +1  DAY BEFORE SURGERY 1/8/2025 Do not take Warfarin  Only take Enoxaparin (Lovenox) dose in the morning  Do not take evening Enoxaparin (Lovenox)            +2  DAY OF SURGERY 1/9/2025 Do not take Enoxaparin (Lovenox) today  Take your usual dose of warfarin in the evening            +3 1/10/2025 Take you usual dose of warfarin  Inject Enoxaparin (Lovenox) in the morning and evening (12 hours apart)            +4 1/11/2025 Take you usual dose of warfarin  Inject Enoxaparin (Lovenox) in the morning and evening (12 hours apart)            +5 1/12/2025 Take you usual dose of warfarin  Inject Enoxaparin (Lovenox) in the morning and evening (12 " hours apart)            +6 1/13/2025 Schedule appointment with Anticoagulation Clinic to access further need for Enoxaparin (Lovenox)

## 2025-01-02 NOTE — PREPROCEDURE INSTRUCTIONS
Thank you for visiting [facility] today for your pre-procedure evaluation, you were seen by Dr. Gaines [phone]    This summary includes instructions and information to aid you during your perioperative period.  Please read carefully. If you have any questions about your visit today, please call the number listed above.  If you become ill or have any changes to your health before your surgery, please contact your primary care provider and alert your surgeon.    Preparing for your Surgery       Exercises  Preoperative Deep Breathing Exercises  Why it is important to do deep breathing exercises before my surgery?  Deep breathing exercises strengthen your breathing muscles.  This helps you to recover after your surgery and decreases the chance of breathing complications.  How are the deep breathing exercises done?  Sit straight with your back supported.  Breathe in deeply and slowly through your nose. Your lower rib cage should expand and your abdomen may move forward.  Hold that breath for 3 to 5 seconds.  Breathe out through pursed lips, slowly and completely.  Rest and repeat 10 times every hour while awake.  Rest longer if you become dizzy or lightheaded.    Preoperative Brain Exercises    What are brain exercises?  A brain exercise is any activity that engages your thinking (cognitive) skills.    What types of activities are considered brain exercises?  Jigsaw puzzles, crossword puzzles, word jumble, memory games, word search, and many more.  Many can be found free online or on your phone via a mobile candace.    Why should I do brain exercises before my surgery?  More recent research has shown brain exercise before surgery can lower the risk of postoperative delirium (confusion) which can be especially important for older adults.  Patients who did brain exercises for 5 to 10 hours the days before surgery, cut their risk of postoperative delirium in half up to 1 week after  surgery.    Sit-to-Stand Exercise    What is the sit-to-stand exercise?  The sit-to-stand exercise strengthens the muscles of your lower body and muscles in the center of your body (core muscles for stability) helping to maintain and improve your strength and mobility.  How do I do the sit-to-stand exercise?  The goal is to do this exercise without using your arms or hands.  If this is too difficult, use your arms and hands or a chair with armrests to help slowly push yourself to the standing position and lower yourself back to the sitting position. As the movement becomes easier use your arms and hands less.    Steps to the sit-to-stand exercise  Sit up tall in a sturdy chair, knees bent, feet flat on the floor shoulder-width apart.  Shift your hips/pelvis forward in the chair to correctly position yourself for the next movement.  Lean forward at your hips.  Stand up straight putting equal weight on both feet.  Check to be sure you are properly aligned with the chair, in a slow controlled movement sit back down.  Repeat this exercise 10-15 times.  If needed you can do it fewer times until your strength improves.  Rest for 1 minute.  Do another 10-15 sit-to-stand exercises.  Try to do this in the morning and evening.        Instructions    Preoperative Fasting Guidelines    Why must I stop eating and drinking near surgery time?  With sedation, food or liquid in your stomach can enter your lungs causing serious complications  Food can increase nausea and vomiting  When do I need to stop eating and drinking before my surgery?      Do not eat any food after midnight the night before your surgery/procedure. You may have up to 13.5 ounces of clear liquid until TWO hours before your instructed arrival time to the hospital.  This includes water, black tea/coffee, (no milk or cream) apple juice, and electrolyte drinks (Gatorade). You may chew gum until TWO hours before your surgery/procedure , Do not eat any food or drink  any liquids after midnight the night before your surgery/procedure.  You may have sips of water to take medications.            Simple things you can do to help prevent blood clots     Blood clots are blockages that can form in the body's veins. When a blood clot forms in your deep veins, it may be called a deep vein thrombosis, or DVT for short. Blood clots can happen in any part of the body where blood flows, but they are most common in the arms and legs. If a piece of a blood clot breaks free and travels to the lungs, it is called a pulmonary embolus (PE). A PE can be a very serious problem.         Being in the hospital or having surgery can raise your chances of getting a blood clot because you may not be well enough to move around as much as you normally do.         Ways you can help prevent blood clots in the hospital       Wearing SCDs  SCDs stands for Sequential Compression Devices.   SCDs are special sleeves that wrap around your legs. They attach to a pump that fills them with air to gently squeeze your legs every few minutes.  This helps return the blood in your legs to your heart.   SCDs should only be taken off when walking or bathing. SCDs may not be comfortable, but they can help save your life.              Pump SCD leg sleeves  Wearing compression stockings - if your doctor orders them. These special snug-fitting stockings gently squeeze your legs to help blood flow.       Walking. Walking helps move the blood in your legs.   If your doctor says it is ok, try walking the halls at least   5 times a day. Ask us to help you get up, so you don't fall.      Taking any blood-thinning medicines your doctor orders.              Ways you can help prevent blood clots at home         Wearing compression stockings - if your doctor orders them.   Walking - to help move the blood in your legs.    Taking any blood-thinning medicines your doctor orders.      Signs of a blood clot or PE    Tell your doctor or nurse  "right away if you have any of the problems listed below.         If you are at home, seek medical care right away. Call 911 for chest pain or problems breathing.            Signs of a blood clot (DVT) - such as pain, swelling, redness, or warmth in your arm or legs.  Signs of a pulmonary embolism (PE) - such as chest pain or feeling short of breath      Tobacco and Alcohol;  Do not drink alcohol or smoke within 24 hours of surgery.  It is best to quit smoking for as long as possible before any surgery or procedure.     Other Instructions  Why did I have my nose, under my arms, and groin swabbed? The purpose of the swab is to identify Staphylococcus aureus inside your nose or on your skin.  The swab was sent to the laboratory for culture.  A positive swab/culture for Staphylococcus aureus is called colonization or carriage.   What is Staphylococcus aureus? Staphylococcus aureus, also known as \"staph\", is a germ found on the skin or in the nose of healthy people.  Sometimes Staphylococcus aureus can get into the body and cause an infection.  This can be minor (such as pimples, boils, or other skin problems).  It might also be serious (such as a blood infection, pneumonia, or a surgical site infection). What is Staphylococcus aureus colonization or carriage? Colonization or carriage means that a person has the germ but is not sick from it.  These bacteria can be spread on the hands or when breathing or sneezing. How is Staphylococcus aureus spread? It is most often spread by close contact with a person or item that carries it. What happens if my culture is positive for Staphylococcus aureus? Your doctor/medical team will use this information to guide any antibiotic treatment which may be necessary.  Regardless of the culture results, we will clean the inside of your nose with a betadine swab just before you have your surgery. Will I get an infection if I have Staphylococcus aureus in my nose or on my skin? Anyone can " get an infection with Staphylococcus aureus.  However, the best way to reduce your risk of infection is to follow the instructions provided to you for the use of your CHG soap and dental rinse.  , Body Wash; What is a home preoperative antibacterial shower? This shower is a way of cleaning the skin with a germ-killing solution before surgery.  The solution contains chlorhexidine, commonly known as CHG.  CHG is a skin cleanser with germ-killing ability.  Let your doctor know if you are allergic to chlorhexidine. Why do I need to take a preoperative antibacterial shower? Skin is not sterile.  It is best to try to make your skin as free of germs as possible before surgery.  Proper cleansing with a germ-killing soap before surgery can lower the number of germs on your skin.  This helps to reduce the risk of infection at the surgical site.  Following the instructions listed below will help you prepare your skin for surgery.   How do I use the solution? Steps:  Begin using your CHG soap 5 days before your scheduled surgery on ___________.   First, wash and rinse your hair using the CHG soap. Keep CHG soap away from ear canals and eyes.  Rinse completely, do not condition.  Hair extensions should be removed. , Oral/Dental Rinse: What is oral/dental rinse?  It is a mouthwash. It is a way of cleaning the mouth with a germ-killing solution before your surgery.  The solution contains chlorhexidine, commonly known as CHG. It is used inside the mouth to kill a bacteria known as Staphylococcus aureus.  Let your doctor know if you are allergic to Chlorhexidine. Why do I need to use CHG oral/dental rinse? The CHG oral/dental rinse helps to kill a bacteria in your mouth known as Staphylococcus aureus.  This reduces the risk of infection at the surgical site.  Using your CHG oral/dental rinse STEPS: Use your CHG oral/dental rinse after you brush your teeth the night before (at bedtime) and the morning of your surgery.  Follow all  directions on your prescription label.  Use the cap on the container to measure 15 ml.  Swish (gargle if you can) the mouthwash in your mouth for at least 30 seconds, (do not swallow) and spit out.  After you use your CHG rinse, do not rinse your mouth with water, drink or eat.  Please refer to the prescription label for the appropriate time to resume oral intake What side effects might I have using the CHG oral/dental rinse? CHG rinse will stick to plaque on the teeth.  Brush and floss just before use.  Teeth brushing will help avoid staining of plaque during use.     The Week before Surgery        Seven days before Surgery  Check your [facility] medication instructions  Do the exercises provided to you by [facility]   Arrange for a responsible, adult licensed  to take you home after surgery and stay with you for 24 hours.  You will not be permitted to drive yourself home if you have received any anesthetic/sedation  Six days before surgery  Check your [facility] medication instructions  Do the exercises provided to you by [facility]   Start using Chlorhexidene (CHG) body wash if prescribed  Five days before surgery  Check your [facility] medication instructions  Do the exercises provided to you by [facility]   Continue to use CHG body wash if prescribed  Three days before surgery  Check your [facility] medication instructions  Do the exercises provided to you by [facility]   Continue to use CHG body wash if prescribed  Two days before surgery  Check your [facility] medication instructions  Do the exercises provided to you by [facility]   Continue to use CHG body wash if prescribed    The Day before Surgery       Check your [facility] medication and all other [facility] instructions including when to stop eating and drinking  You will be called with your arrival time for surgery in the late afternoon.  If you do not receive a call please reach out to your surgeon's office.  Do not smoke or drink 24 hours before surgery  Prepare items to bring with you to the hospital  Shower with your  chlorhexidine wash if prescribed  Brush your teeth and use your chlorhexidine dental rinse if prescribed    The Day of Surgery       Check your [facility] medication instructions  Ensure you follow the instructions for when to stop eating and drinking  Shower, if prescribed use CHG.  Do not apply any lotions, creams, moisturizers, perfume or deodorant  Brush your teeth and use your CHG dental rinse if prescribed  Wear loose comfortable clothing  Avoid make-up  Remove  jewelry and piercings, consider professional piercing removal with a plastic spacer if needed  Bring photo ID and Insurance card  Bring an accurate medication list that includes medication dose, frequency and allergies  Bring a copy of your advanced directives (will, health care power of )  Bring any devices and controllers as well as medical devices you have been provided with for surgery (CPAP, slings, braces, etc.)  Dentures, eyeglasses, and contacts will be removed before surgery, please bring cases for contacts or glasses

## 2025-01-02 NOTE — H&P (VIEW-ONLY)
CPM/PAT Evaluation   Name: Noah Allen (Noah Allen)  /Age: 1970/54 y.o.     In-Person       Chief Complaint: Surgery    HPI  55 y/o M scheduled for Left robotic Adrenalectomy on 25 with Dr. Payan. PMHX includes adrenal mass, lumbar radiculopathy, GERD, SAAD, HTN, HLD, DM, depression, PVD on Warfarin.  Presents to CPM today for perioperative risk stratification and optimization.    Past Medical History:   Diagnosis Date    Adrenal mass (Multi)     Alcohol abuse     quit 20    Anemia, unspecified     Iron deficiency anemia    Salinas's esophagus     On PPI, last EGD in 2023, next due 2026    Buerger's disease (CMS-HCC)     managed by PCP Dr. Jain: on Coumadin    Cervical radiculopathy     Cervical spondylosis     Chronic pain     CKD (chronic kidney disease)     CKD stage 3, follows with nephrology    Contusion of left front wall of thorax, initial encounter 10/23/2021    Contusion of left chest wall, initial encounter    Depression     Disease of spinal cord, unspecified 2018    Cervical myelopathy    DM (diabetes mellitus) (Multi)     A1C 6.0 on 24    ED (erectile dysfunction)     Essential (primary) hypertension 2018    HTN (hypertension), benign    Former smoker     GERD (gastroesophageal reflux disease)     Hematuria     microscopic hematuria    HLD (hyperlipidemia)     Hyponatremia     Lightheadedness     Long term (current) use of anticoagulants 2018    Chronic anticoagulation    Lumbar radiculopathy     Numbness     SAAD (obstructive sleep apnea)     Palpitations     Phimosis of penis     PVD (peripheral vascular disease) (CMS-[facility])     Vitamin D deficiency      Past Surgical History:   Procedure Laterality Date    CERVICAL FUSION      COLONOSCOPY      KNEE SURGERY  2015    Knee Surgery    OTHER SURGICAL HISTORY  2018    Anterior Spinal Diskectomy, Osteophytectomy Cerv Interspace    PENILE PROSTHESIS IMPLANT  2024    UPPER  GASTROINTESTINAL ENDOSCOPY         Patient  has no history on file for sexual activity.    Family History   Problem Relation Name Age of Onset    Other (cerebral infarction) Mother      Other (htn) Mother      Diabetes Father      Other (htn) Father         Allergies   Allergen Reactions    Penicillins Anaphylaxis    Sulfa (Sulfonamide Antibiotics) Anaphylaxis    Lisinopril Cough     cough       Prior to Admission medications    Medication Sig Start Date End Date Taking? Authorizing Provider   acetaminophen (Tylenol) 500 mg tablet Take 2 tablets (1,000 mg) by mouth every 6 hours if needed for mild pain (1 - 3).  Patient not taking: Reported on 11/11/2024 6/19/24   Malik Parra MD   amLODIPine (Norvasc) 10 mg tablet TAKE 1 TABLET BY MOUTH ONCE A DAY 8/21/24   Braulio Jain MD   aspirin 81 mg EC tablet Take 1 tablet (81 mg) by mouth once daily. 1/19/17   Historical Provider, MD   atorvastatin (Lipitor) 40 mg tablet Take 1 tablet (40 mg) by mouth once daily. 9/26/24   Braulio Jain MD   baclofen (Lioresal) 10 mg tablet Take 1 tablet (10 mg) by mouth 2 times a day as needed. 12/4/21   Historical Provider, MD   cholecalciferol (Vitamin D-3) 50 mcg (2,000 unit) capsule Take 1 tablet by mouth early in the morning.. 7/24/20   Historical Provider, MD   ciclopirox (Penlac) 8 % solution APPLY TOPICALLY ONCE DAILY. REMOVE WEEKLY  Patient not taking: Reported on 11/11/2024 2/28/24   Sarah Whitley DPM   dexAMETHasone (Decadron) 1 mg tablet Take 1 tablet (1 mg) by mouth 1 time for 1 dose. Take 1 tablet by mouth @ 11pm before morning lab draw 10/4/24 10/4/24  Eliseo Payan MD MPH   docusate sodium (Colace) 100 mg capsule Take 1 capsule (100 mg) by mouth 2 times a day.    Historical Provider, MD   DULoxetine (Cymbalta) 60 mg DR capsule Take 1 capsule (60 mg) by mouth 2 times a day.    Historical Provider, MD   enoxaparin (Lovenox) 80 mg/0.8 mL syringe Inject 0.8 mL (80 mg) under the skin every 12 hours.  Patient not  taking: Reported on 11/11/2024 10/4/24   Braulio Jain MD   enoxaparin (Lovenox) 80 mg/0.8 mL syringe Inject 0.8 mL (80 mg) under the skin every 12 hours. 12/18/24   Braulio Jain MD   ferrous sulfate 325 (65 Fe) MG EC tablet Take 1 tablet (65 mg) by mouth once daily with a meal. 7/24/20   Historical Provider, MD   gabapentin (Neurontin) 300 mg capsule Take 3 capsules (900 mg) by mouth 2 times a day. Take as directed    Historical Provider, MD   losartan (Cozaar) 100 mg tablet TAKE 1 TABLET BY MOUTH EVERY DAY  Patient not taking: Reported on 11/11/2024 3/28/24   Braulio Jain MD   losartan (Cozaar) 100 mg tablet Take 1 tablet (100 mg) by mouth once daily. 3/28/24 9/24/24  Braulio Jain MD   losartan (Cozaar) 100 mg tablet TAKE 1 TABLET BY MOUTH ONCE A DAY 8/21/24   Braulio Jain MD   metFORMIN  mg 24 hr tablet Take 1 tablet (500 mg) by mouth 2 times a day. 9/26/24   Braulio Jain MD   methylPREDNISolone (Medrol Dospak) 4 mg tablets Take each day's dose with one meal  Patient not taking: Reported on 11/11/2024 9/13/24   Soumya Cates APRN-CNP   omeprazole (PriLOSEC) 40 mg DR capsule Take 1 capsule (40 mg) by mouth 2 times a day. 9/26/24   Braulio Jain MD   traMADol (Ultram) 50 mg tablet Take 2 tablets (100 mg) by mouth 2 times a day.    Historical Provider, MD   warfarin (Coumadin) 7.5 mg tablet Take 1 tablet (7.5 mg) by mouth once daily at bedtime. 9/26/24   Braulio Jain MD        PAT ROS:   Constitutional:   neg    Neuro/Psych:   neg    Eyes:   neg    Ears:   neg    Nose:   neg    Mouth:   neg    Throat:   neg    Neck:   neg    Cardio:   neg    Respiratory:   neg    Endocrine:   neg    GI:   neg    :   neg    Musculoskeletal:   neg    Hematologic:   neg    Skin:  neg        PAT Physical Exam   General: Laying in bed. NAD.   Eyes: EOMI  ENMT: no apparent injury, no lesions seen, MMM  Head/neck: NCAT  Cardiac: regular rate in chart  Pulm: normal respiratory effort  GI: soft, NT/ND, no masses  palpated  Msk: MARTINEZ  Extremities: normal extremities  Skin: warm, dry, no lesions noted  Neuro: AOx3  Psych: appropriate mood and behavior    PAT AIRWAY:   Airway:     Mallampati::  II    TM distance::  >3 FB    Neck ROM::  Limited    Vitals:    01/02/25 1311   BP: 116/78   Pulse: 80   Temp: 36.7 °C (98.1 °F)   SpO2: 100%       Visit Vitals  Smoking Status Former       DASI Risk Score      Flowsheet Row Questionnaire Series Submission from 12/26/2024 in [facility] with Generic Provider Dameon   Can you take care of yourself (eat, dress, bathe, or use toilet)?  2.75  filed at 12/26/2024 1420   Can you do yard work like raking leaves, weeding or pushing a mower? 4.5  filed at 12/26/2024 1420          Caprini DVT Assessment    No data to display       Modified Frailty Index    No data to display       CHADS2 Stroke Risk  Current as of 2 days ago (Tuesday)        N/A 3 to 100%: High Risk   2 to < 3%: Medium Risk   0 to < 2%: Low Risk     Last Change: N/A          This score determines the patient's risk of having a stroke if the patient has atrial fibrillation.        This score is not applicable to this patient. Components are not calculated.          Revised Cardiac Risk Index    No data to display       Apfel Simplified Score    No data to display       Risk Analysis Index Results This Encounter    No data found in the last 10 encounters.       Prodigy: High Risk  Total Score: 11              Prodigy Gender Score     Prodigy Previous Opioid Use Score           ARISCAT Score for Postoperative Pulmonary Complications    No data to display       Wright Perioperative Risk for Myocardial Infarction or Cardiac Arrest (MARIN)    No data to display         No results found. However, due to the size of the patient record, not all encounters were searched. Please check Results Review for a complete set of results.     Assessment and Plan:  55 y/o M scheduled for Left robotic Adrenalectomy on 1/9/25 with Dr. Payan. PMX  includes adrenal mass, lumbar radiculopathy, GERD, SAAD, HTN, HLD, DM, depression, PVD on Warfarin.  Presents to CPM today for perioperative risk stratification and optimization.    Neuro:  Depression - managed with Duloxetine  Cervical stenosis - s/p multiple cervical spine surgeries, now has tremors on bacophen    Lumbar radiculopathy:  Pending L3-S1 lumbar laminectomy and L5-S1 lumbar fusion approximately 3-6 months after his removal of the adrenal mass per Dr. Nieves 11/11/24.    MRI lateral recess disease L2-3, L3-4, L4-5 with moderate to severe foraminal stenosis and severe degenerative disc disease L3-S1 and grade 1 spondylolisthesis at L5-S1.     Patient is at increased risk for perioperative CVA secondary to  perioperative interruption of anticoagulant, HTN, DM, operative time > 2.5 hours    HEENT:  No HEENT diagnosis or significant findings on chart review or clinical presentation and evaluation. No further preoperative testing/intervention indicated at this time.    Cardiovascular:  HTN, HLD  Amlodipine, atorvostatin, aspirin, Losartan (will hold prior)    Carotid US: Feb 2023  IMPRESSION:  Mild calcified plaque in each carotid bulb and proximal ICA resulting  in no greater than 50% diameter stenosis.    METS: 6.5  RCRI: 1 point, 6.0% risk for postoperative MACE   MARIN: 1.33% risk for 30 day postoperative MACE  EKG - today 1/2/25 - NSR 74 bpm    Pulmonary:  SAAD - not on CPAP, instructed to follow up with PCP.   Stop Bang score is 4 placing patient at high risk for SAAD  ARISCAT: 26-44 points, 13.3% risk of in-hospital postoperative pulmonary complication  PRODIGY: Moderate risk for opioid induced respiratory depression    Renal:   CKDIII - baseline Cr. 1.31, follows with PCP  Risk factors:  HTN, PAD, renal insufficiency   Pt at High risk for perioperative JENNIFER based on Dynamic Predictive Scoring Tool for Perioperative JENNIFER     Endocrine:  Adrenal mass  DM - managed with metformin  A1c 6.0%, Sept 2024    He was  recommended a left Robotic Adrenalectomy given growth kinetics.      His was scheduled for a left Robotic Adrenalectomy that was cancelled due to a high normetanephrine and concerns for a pheochromocytoma. Blockers were not needed and his surgery was rescheduled.      CT chest abdomen pelvis showed a low-attenuation lesion involving left adrenal gland measuring up to 3.1 x 3.4 cm size, significantly increased in size since previous  exam from 07/06/2023 from 15 x 13 mm previously. The right adrenal  gland within normal limits.     He is being followed by Dr. Jones, endocrinology.   Dr. Blankenship and Dr. Jones have spoken confirming this is not a pheochromocytoma and have agreed to proceed with surgery without pharmacologic blockade.     Will obtain records from Dr. Jones.    Hematologic:  Buergers disease-patient is on Coumadin     INR Supratherapeutic 12/31/24 at 4.9  No warfarin for 2 days and then  Back on 1/2/25 to make sure INR has decreased.    Warfarin to lovenox bridging started and given instructions.      Caprini Score 6, patient at Moderate risk for perioperative DVT.  Patient provided with VTE education/handout.    Gastrointestinal:   Barretts esophagus/GERD - omeprazole  Eat-10 score 1  Apfel 0    Infectious disease:   No infectious diagnosis or significant findings on chart review or clinical presentation and evaluation.   Prescription provided for CHG body wash and dental rinse. CHG use instructions reviewed and provided to patient.  Staph screen collected    Musculoskeletal:   No diagnosis or significant findings on chart review or clinical presentation and evaluation.     Anesthesia/Airway:  No anesthesia complications    Medication instructions and NPO guidelines reviewed with the patient.  All questions or concerns discussed and addressed.      Patient seen and staffed with Dr. Gaines.    Joaquín Roa DO  Anesthesiology PGY-3

## 2025-01-03 LAB — HOLD SPECIMEN: NORMAL

## 2025-01-04 LAB — STAPHYLOCOCCUS SPEC CULT: NORMAL

## 2025-01-06 LAB
ATRIAL RATE: 74 BPM
P AXIS: 104 DEGREES
P OFFSET: 190 MS
P ONSET: 135 MS
PR INTERVAL: 168 MS
Q ONSET: 219 MS
QRS COUNT: 12 BEATS
QRS DURATION: 78 MS
QT INTERVAL: 368 MS
QTC CALCULATION(BAZETT): 408 MS
QTC FREDERICIA: 394 MS
R AXIS: 76 DEGREES
T AXIS: 65 DEGREES
T OFFSET: 403 MS
VENTRICULAR RATE: 74 BPM

## 2025-01-06 PROCEDURE — 93005 ELECTROCARDIOGRAM TRACING: CPT

## 2025-01-07 LAB — NON-UH HIE POC GLUCOSE: 115 MG/DL (ref 72–100)

## 2025-01-08 NOTE — PROGRESS NOTES
Pharmacy Medication History Review    Noah Allen is a 54 y.o. male who is planned to be admitted for Adrenal mass (Multi). Pharmacy called the patient prior to their scheduled procedure and reviewed the patient's [id number]-jybpdoikc medications for accuracy.    Medications ADDED:  none  Medications CHANGED:  Warfarin 7.5mg directions from #1QD to #1QD except #1/2 (3.75mg) on Tuesdays and thursdays  Medications REMOVED:   Dexamethasone 1mg    Please review updated prior to admission medication list and comments regarding how patient may be taking medications differently by going to Admission tab --> Admission Orders --> Admit Orders / Review prior to admission medications.     Preferred pharmacy, last doses of medications, and allergies to be confirmed with patient by nursing the day of procedure.     Sources used to complete the med history include:  [facility]  Pharmacy dispense history  Patient interview  Chart Review  Care Everywhere     Below are additional concerns with the patient's PTA list.  Patient states they take #3 capsules of gabapentin 300mg twice daily. L.F. 01/01/25 #180/30d. [place]ARRS verified. Prescription states #2TID  Patient states they are  taking #1 tablet of baclofen 10mg twice daily. L.F. 12/17/24 #90/30d. Prescription states #1/2-1TIDprn  Patient confirmed they are injecting enoxaparin 80mg/0.8ml twice daily. This is replacing warfarin 7.5mg in preparation of procedure. Per anticoagulation visit 12/31/24 patient's recent dose of warfarin is 7.5mg daily, except 3.75mg on Tuesdays and thursdays    Liz Jackson [facility]PH[facility]  Please reach out via Secure Chat for questions or call [employer] or [employer]

## 2025-01-09 ENCOUNTER — HOSPITAL ENCOUNTER (INPATIENT)
Facility: HOSPITAL | Age: 55
LOS: 1 days | Discharge: HOME | End: 2025-01-10
Attending: STUDENT IN AN ORGANIZED HEALTH CARE EDUCATION/TRAINING PROGRAM | Admitting: STUDENT IN AN ORGANIZED HEALTH CARE EDUCATION/TRAINING PROGRAM
Payer: MEDICARE

## 2025-01-09 ENCOUNTER — ANESTHESIA (OUTPATIENT)
Dept: OPERATING ROOM | Facility: HOSPITAL | Age: 55
End: 2025-01-09
Payer: MEDICARE

## 2025-01-09 DIAGNOSIS — E27.8 ADRENAL MASS (MULTI): Primary | ICD-10-CM

## 2025-01-09 DIAGNOSIS — Z41.9 SURGERY, ELECTIVE: ICD-10-CM

## 2025-01-09 DIAGNOSIS — G89.18 POSTOPERATIVE PAIN: ICD-10-CM

## 2025-01-09 PROBLEM — Z79.01 ANTICOAGULANT LONG-TERM USE: Status: ACTIVE | Noted: 2025-01-09

## 2025-01-09 LAB
ABO GROUP (TYPE) IN BLOOD: NORMAL
GLUCOSE BLD MANUAL STRIP-MCNC: 105 MG/DL (ref 74–99)
GLUCOSE BLD MANUAL STRIP-MCNC: 123 MG/DL (ref 74–99)
GLUCOSE BLD MANUAL STRIP-MCNC: 148 MG/DL (ref 74–99)
RH FACTOR (ANTIGEN D): NORMAL

## 2025-01-09 PROCEDURE — 2720000007 HC OR 272 NO HCPCS: Performed by: STUDENT IN AN ORGANIZED HEALTH CARE EDUCATION/TRAINING PROGRAM

## 2025-01-09 PROCEDURE — 3600000017 HC OR TIME - EACH INCREMENTAL 1 MINUTE - PROCEDURE LEVEL SIX: Performed by: STUDENT IN AN ORGANIZED HEALTH CARE EDUCATION/TRAINING PROGRAM

## 2025-01-09 PROCEDURE — 0GT24ZZ RESECTION OF LEFT ADRENAL GLAND, PERCUTANEOUS ENDOSCOPIC APPROACH: ICD-10-PCS | Performed by: STUDENT IN AN ORGANIZED HEALTH CARE EDUCATION/TRAINING PROGRAM

## 2025-01-09 PROCEDURE — 36415 COLL VENOUS BLD VENIPUNCTURE: CPT | Performed by: ANESTHESIOLOGY

## 2025-01-09 PROCEDURE — 7100000011 HC EXTENDED STAY RECOVERY HOURLY - NURSING UNIT

## 2025-01-09 PROCEDURE — 60650 LAPAROSCOPY ADRENALECTOMY: CPT | Performed by: STUDENT IN AN ORGANIZED HEALTH CARE EDUCATION/TRAINING PROGRAM

## 2025-01-09 PROCEDURE — 3600000018 HC OR TIME - INITIAL BASE CHARGE - PROCEDURE LEVEL SIX: Performed by: STUDENT IN AN ORGANIZED HEALTH CARE EDUCATION/TRAINING PROGRAM

## 2025-01-09 PROCEDURE — 2500000004 HC RX 250 GENERAL PHARMACY W/ HCPCS (ALT 636 FOR OP/ED): Mod: JZ,JG,TB | Performed by: STUDENT IN AN ORGANIZED HEALTH CARE EDUCATION/TRAINING PROGRAM

## 2025-01-09 PROCEDURE — 2500000001 HC RX 250 WO HCPCS SELF ADMINISTERED DRUGS (ALT 637 FOR MEDICARE OP): Performed by: STUDENT IN AN ORGANIZED HEALTH CARE EDUCATION/TRAINING PROGRAM

## 2025-01-09 PROCEDURE — 7100000002 HC RECOVERY ROOM TIME - EACH INCREMENTAL 1 MINUTE: Performed by: STUDENT IN AN ORGANIZED HEALTH CARE EDUCATION/TRAINING PROGRAM

## 2025-01-09 PROCEDURE — 2780000003 HC OR 278 NO HCPCS: Performed by: STUDENT IN AN ORGANIZED HEALTH CARE EDUCATION/TRAINING PROGRAM

## 2025-01-09 PROCEDURE — 8E0W4CZ ROBOTIC ASSISTED PROCEDURE OF TRUNK REGION, PERCUTANEOUS ENDOSCOPIC APPROACH: ICD-10-PCS | Performed by: STUDENT IN AN ORGANIZED HEALTH CARE EDUCATION/TRAINING PROGRAM

## 2025-01-09 PROCEDURE — 2500000002 HC RX 250 W HCPCS SELF ADMINISTERED DRUGS (ALT 637 FOR MEDICARE OP, ALT 636 FOR OP/ED): Performed by: STUDENT IN AN ORGANIZED HEALTH CARE EDUCATION/TRAINING PROGRAM

## 2025-01-09 PROCEDURE — 2500000004 HC RX 250 GENERAL PHARMACY W/ HCPCS (ALT 636 FOR OP/ED): Performed by: ANESTHESIOLOGY

## 2025-01-09 PROCEDURE — 49591 RPR AA HRN 1ST < 3 CM RDC: CPT | Performed by: STUDENT IN AN ORGANIZED HEALTH CARE EDUCATION/TRAINING PROGRAM

## 2025-01-09 PROCEDURE — 3700000002 HC GENERAL ANESTHESIA TIME - EACH INCREMENTAL 1 MINUTE: Performed by: STUDENT IN AN ORGANIZED HEALTH CARE EDUCATION/TRAINING PROGRAM

## 2025-01-09 PROCEDURE — A60650 PR LAP,ADRENALECTOMY: Performed by: ANESTHESIOLOGY

## 2025-01-09 PROCEDURE — 88307 TISSUE EXAM BY PATHOLOGIST: CPT | Mod: TC,SUR | Performed by: STUDENT IN AN ORGANIZED HEALTH CARE EDUCATION/TRAINING PROGRAM

## 2025-01-09 PROCEDURE — 2500000004 HC RX 250 GENERAL PHARMACY W/ HCPCS (ALT 636 FOR OP/ED): Performed by: NURSE ANESTHETIST, CERTIFIED REGISTERED

## 2025-01-09 PROCEDURE — 51702 INSERT TEMP BLADDER CATH: CPT

## 2025-01-09 PROCEDURE — 96372 THER/PROPH/DIAG INJ SC/IM: CPT | Performed by: STUDENT IN AN ORGANIZED HEALTH CARE EDUCATION/TRAINING PROGRAM

## 2025-01-09 PROCEDURE — 82947 ASSAY GLUCOSE BLOOD QUANT: CPT

## 2025-01-09 PROCEDURE — 2500000004 HC RX 250 GENERAL PHARMACY W/ HCPCS (ALT 636 FOR OP/ED): Performed by: STUDENT IN AN ORGANIZED HEALTH CARE EDUCATION/TRAINING PROGRAM

## 2025-01-09 PROCEDURE — 2500000005 HC RX 250 GENERAL PHARMACY W/O HCPCS: Performed by: ANESTHESIOLOGY

## 2025-01-09 PROCEDURE — 49591 RPR AA HRN 1ST < 3 CM RDC: CPT

## 2025-01-09 PROCEDURE — 3700000001 HC GENERAL ANESTHESIA TIME - INITIAL BASE CHARGE: Performed by: STUDENT IN AN ORGANIZED HEALTH CARE EDUCATION/TRAINING PROGRAM

## 2025-01-09 PROCEDURE — 7100000001 HC RECOVERY ROOM TIME - INITIAL BASE CHARGE: Performed by: STUDENT IN AN ORGANIZED HEALTH CARE EDUCATION/TRAINING PROGRAM

## 2025-01-09 PROCEDURE — A60650 PR LAP,ADRENALECTOMY: Performed by: NURSE ANESTHETIST, CERTIFIED REGISTERED

## 2025-01-09 PROCEDURE — 88307 TISSUE EXAM BY PATHOLOGIST: CPT | Performed by: PATHOLOGY

## 2025-01-09 PROCEDURE — 60650 LAPAROSCOPY ADRENALECTOMY: CPT

## 2025-01-09 RX ORDER — HYDRALAZINE HYDROCHLORIDE 20 MG/ML
10 INJECTION INTRAMUSCULAR; INTRAVENOUS EVERY 6 HOURS PRN
Status: DISCONTINUED | OUTPATIENT
Start: 2025-01-09 | End: 2025-01-10 | Stop reason: HOSPADM

## 2025-01-09 RX ORDER — SIMETHICONE 80 MG
160 TABLET,CHEWABLE ORAL
Status: DISCONTINUED | OUTPATIENT
Start: 2025-01-09 | End: 2025-01-10 | Stop reason: HOSPADM

## 2025-01-09 RX ORDER — CHLORHEXIDINE GLUCONATE 40 MG/ML
SOLUTION TOPICAL DAILY PRN
Status: DISCONTINUED | OUTPATIENT
Start: 2025-01-09 | End: 2025-01-09 | Stop reason: HOSPADM

## 2025-01-09 RX ORDER — OXYCODONE HYDROCHLORIDE 5 MG/1
5 TABLET ORAL EVERY 6 HOURS PRN
Status: DISCONTINUED | OUTPATIENT
Start: 2025-01-09 | End: 2025-01-10 | Stop reason: HOSPADM

## 2025-01-09 RX ORDER — SODIUM CHLORIDE, SODIUM LACTATE, POTASSIUM CHLORIDE, CALCIUM CHLORIDE 600; 310; 30; 20 MG/100ML; MG/100ML; MG/100ML; MG/100ML
100 INJECTION, SOLUTION INTRAVENOUS CONTINUOUS
Status: DISCONTINUED | OUTPATIENT
Start: 2025-01-09 | End: 2025-01-10

## 2025-01-09 RX ORDER — ROCURONIUM BROMIDE 10 MG/ML
INJECTION, SOLUTION INTRAVENOUS AS NEEDED
Status: DISCONTINUED | OUTPATIENT
Start: 2025-01-09 | End: 2025-01-09

## 2025-01-09 RX ORDER — FENTANYL CITRATE 50 UG/ML
INJECTION, SOLUTION INTRAMUSCULAR; INTRAVENOUS AS NEEDED
Status: DISCONTINUED | OUTPATIENT
Start: 2025-01-09 | End: 2025-01-09

## 2025-01-09 RX ORDER — AMOXICILLIN 250 MG
2 CAPSULE ORAL 2 TIMES DAILY
Status: DISCONTINUED | OUTPATIENT
Start: 2025-01-09 | End: 2025-01-10 | Stop reason: HOSPADM

## 2025-01-09 RX ORDER — POLYETHYLENE GLYCOL 3350 17 G/17G
17 POWDER, FOR SOLUTION ORAL DAILY
Status: DISCONTINUED | OUTPATIENT
Start: 2025-01-09 | End: 2025-01-10 | Stop reason: HOSPADM

## 2025-01-09 RX ORDER — LIDOCAINE 560 MG/1
1 PATCH PERCUTANEOUS; TOPICAL; TRANSDERMAL DAILY
Status: DISCONTINUED | OUTPATIENT
Start: 2025-01-09 | End: 2025-01-10 | Stop reason: HOSPADM

## 2025-01-09 RX ORDER — BUPIVACAINE HYDROCHLORIDE 5 MG/ML
INJECTION, SOLUTION EPIDURAL; INTRACAUDAL AS NEEDED
Status: DISCONTINUED | OUTPATIENT
Start: 2025-01-09 | End: 2025-01-09 | Stop reason: HOSPADM

## 2025-01-09 RX ORDER — HYDROMORPHONE HYDROCHLORIDE 1 MG/ML
INJECTION, SOLUTION INTRAMUSCULAR; INTRAVENOUS; SUBCUTANEOUS AS NEEDED
Status: DISCONTINUED | OUTPATIENT
Start: 2025-01-09 | End: 2025-01-09

## 2025-01-09 RX ORDER — OXYCODONE HYDROCHLORIDE 5 MG/1
5 TABLET ORAL EVERY 4 HOURS PRN
Status: DISCONTINUED | OUTPATIENT
Start: 2025-01-09 | End: 2025-01-09 | Stop reason: HOSPADM

## 2025-01-09 RX ORDER — ACETAMINOPHEN 325 MG/1
975 TABLET ORAL ONCE
Status: COMPLETED | OUTPATIENT
Start: 2025-01-09 | End: 2025-01-09

## 2025-01-09 RX ORDER — ATORVASTATIN CALCIUM 40 MG/1
40 TABLET, FILM COATED ORAL NIGHTLY
Status: DISCONTINUED | OUTPATIENT
Start: 2025-01-09 | End: 2025-01-10 | Stop reason: HOSPADM

## 2025-01-09 RX ORDER — PHENYLEPHRINE HCL IN 0.9% NACL 0.4MG/10ML
SYRINGE (ML) INTRAVENOUS AS NEEDED
Status: DISCONTINUED | OUTPATIENT
Start: 2025-01-09 | End: 2025-01-09

## 2025-01-09 RX ORDER — ONDANSETRON HYDROCHLORIDE 2 MG/ML
INJECTION, SOLUTION INTRAVENOUS AS NEEDED
Status: DISCONTINUED | OUTPATIENT
Start: 2025-01-09 | End: 2025-01-09

## 2025-01-09 RX ORDER — CLINDAMYCIN PHOSPHATE 600 MG/50ML
INJECTION, SOLUTION INTRAVENOUS AS NEEDED
Status: DISCONTINUED | OUTPATIENT
Start: 2025-01-09 | End: 2025-01-09

## 2025-01-09 RX ORDER — BACLOFEN 10 MG/1
10 TABLET ORAL 2 TIMES DAILY PRN
Status: DISCONTINUED | OUTPATIENT
Start: 2025-01-09 | End: 2025-01-10 | Stop reason: HOSPADM

## 2025-01-09 RX ORDER — HEPARIN SODIUM 5000 [USP'U]/ML
5000 INJECTION, SOLUTION INTRAVENOUS; SUBCUTANEOUS EVERY 8 HOURS
Status: DISCONTINUED | OUTPATIENT
Start: 2025-01-09 | End: 2025-01-10

## 2025-01-09 RX ORDER — ONDANSETRON HYDROCHLORIDE 2 MG/ML
4 INJECTION, SOLUTION INTRAVENOUS EVERY 8 HOURS PRN
Status: DISCONTINUED | OUTPATIENT
Start: 2025-01-09 | End: 2025-01-10 | Stop reason: HOSPADM

## 2025-01-09 RX ORDER — DULOXETIN HYDROCHLORIDE 60 MG/1
60 CAPSULE, DELAYED RELEASE ORAL 2 TIMES DAILY
Status: DISCONTINUED | OUTPATIENT
Start: 2025-01-09 | End: 2025-01-10 | Stop reason: HOSPADM

## 2025-01-09 RX ORDER — PROPOFOL 10 MG/ML
INJECTION, EMULSION INTRAVENOUS AS NEEDED
Status: DISCONTINUED | OUTPATIENT
Start: 2025-01-09 | End: 2025-01-09

## 2025-01-09 RX ORDER — HEPARIN SODIUM 5000 [USP'U]/ML
5000 INJECTION, SOLUTION INTRAVENOUS; SUBCUTANEOUS ONCE
Status: COMPLETED | OUTPATIENT
Start: 2025-01-09 | End: 2025-01-09

## 2025-01-09 RX ORDER — GABAPENTIN 300 MG/1
300 CAPSULE ORAL ONCE
Status: DISCONTINUED | OUTPATIENT
Start: 2025-01-09 | End: 2025-01-09 | Stop reason: HOSPADM

## 2025-01-09 RX ORDER — CELECOXIB 200 MG/1
400 CAPSULE ORAL ONCE
Status: COMPLETED | OUTPATIENT
Start: 2025-01-09 | End: 2025-01-09

## 2025-01-09 RX ORDER — AMLODIPINE BESYLATE 10 MG/1
10 TABLET ORAL DAILY
Status: DISCONTINUED | OUTPATIENT
Start: 2025-01-10 | End: 2025-01-10 | Stop reason: HOSPADM

## 2025-01-09 RX ORDER — SODIUM CHLORIDE, SODIUM LACTATE, POTASSIUM CHLORIDE, CALCIUM CHLORIDE 600; 310; 30; 20 MG/100ML; MG/100ML; MG/100ML; MG/100ML
100 INJECTION, SOLUTION INTRAVENOUS CONTINUOUS
Status: DISCONTINUED | OUTPATIENT
Start: 2025-01-09 | End: 2025-01-09 | Stop reason: SDUPTHER

## 2025-01-09 RX ORDER — LIDOCAINE HYDROCHLORIDE 20 MG/ML
INJECTION, SOLUTION INFILTRATION; PERINEURAL AS NEEDED
Status: DISCONTINUED | OUTPATIENT
Start: 2025-01-09 | End: 2025-01-09

## 2025-01-09 RX ORDER — ASPIRIN 81 MG/1
81 TABLET ORAL DAILY
Status: DISCONTINUED | OUTPATIENT
Start: 2025-01-09 | End: 2025-01-10 | Stop reason: HOSPADM

## 2025-01-09 RX ORDER — MIDAZOLAM HYDROCHLORIDE 1 MG/ML
INJECTION INTRAMUSCULAR; INTRAVENOUS AS NEEDED
Status: DISCONTINUED | OUTPATIENT
Start: 2025-01-09 | End: 2025-01-09

## 2025-01-09 RX ORDER — HYDROMORPHONE HYDROCHLORIDE 0.2 MG/ML
0.2 INJECTION INTRAMUSCULAR; INTRAVENOUS; SUBCUTANEOUS EVERY 2 HOUR PRN
Status: DISCONTINUED | OUTPATIENT
Start: 2025-01-09 | End: 2025-01-10 | Stop reason: HOSPADM

## 2025-01-09 RX ORDER — PANTOPRAZOLE SODIUM 40 MG/1
40 TABLET, DELAYED RELEASE ORAL
Status: DISCONTINUED | OUTPATIENT
Start: 2025-01-09 | End: 2025-01-10 | Stop reason: HOSPADM

## 2025-01-09 RX ORDER — ONDANSETRON 4 MG/1
4 TABLET, ORALLY DISINTEGRATING ORAL EVERY 8 HOURS PRN
Status: DISCONTINUED | OUTPATIENT
Start: 2025-01-09 | End: 2025-01-10 | Stop reason: HOSPADM

## 2025-01-09 RX ORDER — OXYCODONE HYDROCHLORIDE 5 MG/1
10 TABLET ORAL EVERY 4 HOURS PRN
Status: DISCONTINUED | OUTPATIENT
Start: 2025-01-09 | End: 2025-01-10 | Stop reason: HOSPADM

## 2025-01-09 RX ORDER — ACETAMINOPHEN 325 MG/1
975 TABLET ORAL EVERY 6 HOURS
Status: DISCONTINUED | OUTPATIENT
Start: 2025-01-09 | End: 2025-01-10 | Stop reason: HOSPADM

## 2025-01-09 RX ORDER — HYDROMORPHONE HYDROCHLORIDE 0.2 MG/ML
0.2 INJECTION INTRAMUSCULAR; INTRAVENOUS; SUBCUTANEOUS EVERY 5 MIN PRN
Status: DISCONTINUED | OUTPATIENT
Start: 2025-01-09 | End: 2025-01-09 | Stop reason: HOSPADM

## 2025-01-09 RX ORDER — LIDOCAINE HYDROCHLORIDE 10 MG/ML
0.1 INJECTION, SOLUTION EPIDURAL; INFILTRATION; INTRACAUDAL; PERINEURAL ONCE
Status: DISCONTINUED | OUTPATIENT
Start: 2025-01-09 | End: 2025-01-09 | Stop reason: HOSPADM

## 2025-01-09 RX ORDER — GABAPENTIN 300 MG/1
900 CAPSULE ORAL 2 TIMES DAILY
Status: DISCONTINUED | OUTPATIENT
Start: 2025-01-09 | End: 2025-01-10 | Stop reason: HOSPADM

## 2025-01-09 RX ADMIN — DULOXETINE HYDROCHLORIDE 60 MG: 60 CAPSULE, DELAYED RELEASE ORAL at 21:05

## 2025-01-09 RX ADMIN — OXYCODONE 5 MG: 5 TABLET ORAL at 17:49

## 2025-01-09 RX ADMIN — HYDROMORPHONE HYDROCHLORIDE 0.5 MG: 1 INJECTION, SOLUTION INTRAMUSCULAR; INTRAVENOUS; SUBCUTANEOUS at 13:05

## 2025-01-09 RX ADMIN — SODIUM CHLORIDE, POTASSIUM CHLORIDE, SODIUM LACTATE AND CALCIUM CHLORIDE 100 ML/HR: 600; 310; 30; 20 INJECTION, SOLUTION INTRAVENOUS at 15:55

## 2025-01-09 RX ADMIN — FENTANYL CITRATE 50 MCG: 50 INJECTION, SOLUTION INTRAMUSCULAR; INTRAVENOUS at 11:33

## 2025-01-09 RX ADMIN — FENTANYL CITRATE 50 MCG: 50 INJECTION, SOLUTION INTRAMUSCULAR; INTRAVENOUS at 10:49

## 2025-01-09 RX ADMIN — HYDROMORPHONE HYDROCHLORIDE 0.5 MG: 1 INJECTION, SOLUTION INTRAMUSCULAR; INTRAVENOUS; SUBCUTANEOUS at 12:34

## 2025-01-09 RX ADMIN — SENNOSIDES AND DOCUSATE SODIUM 2 TABLET: 50; 8.6 TABLET ORAL at 20:19

## 2025-01-09 RX ADMIN — SODIUM CHLORIDE, SODIUM LACTATE, POTASSIUM CHLORIDE, AND CALCIUM CHLORIDE: 600; 310; 30; 20 INJECTION, SOLUTION INTRAVENOUS at 10:48

## 2025-01-09 RX ADMIN — ONDANSETRON 4 MG: 2 INJECTION INTRAMUSCULAR; INTRAVENOUS at 12:26

## 2025-01-09 RX ADMIN — SUGAMMADEX 200 MG: 100 INJECTION, SOLUTION INTRAVENOUS at 12:45

## 2025-01-09 RX ADMIN — Medication 3 L/MIN: at 13:44

## 2025-01-09 RX ADMIN — ROCURONIUM 30 MG: 50 INJECTION, SOLUTION INTRAVENOUS at 11:09

## 2025-01-09 RX ADMIN — HYDROMORPHONE HYDROCHLORIDE 0.5 MG: 1 INJECTION, SOLUTION INTRAMUSCULAR; INTRAVENOUS; SUBCUTANEOUS at 14:02

## 2025-01-09 RX ADMIN — ATORVASTATIN CALCIUM 40 MG: 40 TABLET, FILM COATED ORAL at 20:19

## 2025-01-09 RX ADMIN — ROCURONIUM 10 MG: 50 INJECTION, SOLUTION INTRAVENOUS at 12:03

## 2025-01-09 RX ADMIN — LIDOCAINE HYDROCHLORIDE 100 MG: 20 INJECTION, SOLUTION INFILTRATION; PERINEURAL at 10:49

## 2025-01-09 RX ADMIN — HYDROMORPHONE HYDROCHLORIDE 0.5 MG: 1 INJECTION, SOLUTION INTRAMUSCULAR; INTRAVENOUS; SUBCUTANEOUS at 13:30

## 2025-01-09 RX ADMIN — HEPARIN SODIUM 5000 UNITS: 5000 INJECTION INTRAVENOUS; SUBCUTANEOUS at 17:49

## 2025-01-09 RX ADMIN — HEPARIN SODIUM 5000 UNITS: 5000 INJECTION, SOLUTION INTRAVENOUS; SUBCUTANEOUS at 10:41

## 2025-01-09 RX ADMIN — HYDROMORPHONE HYDROCHLORIDE 0.2 MG: 0.2 INJECTION, SOLUTION INTRAMUSCULAR; INTRAVENOUS; SUBCUTANEOUS at 20:20

## 2025-01-09 RX ADMIN — GABAPENTIN 900 MG: 300 CAPSULE ORAL at 20:19

## 2025-01-09 RX ADMIN — PROPOFOL 30 MG: 10 INJECTION, EMULSION INTRAVENOUS at 12:46

## 2025-01-09 RX ADMIN — Medication 80 MCG: at 10:49

## 2025-01-09 RX ADMIN — CLINDAMYCIN IN 5 PERCENT DEXTROSE 600 MG: 12 INJECTION, SOLUTION INTRAVENOUS at 11:01

## 2025-01-09 RX ADMIN — ACETAMINOPHEN 975 MG: 325 TABLET, FILM COATED ORAL at 20:19

## 2025-01-09 RX ADMIN — ACETAMINOPHEN 975 MG: 325 TABLET, FILM COATED ORAL at 15:55

## 2025-01-09 RX ADMIN — CELECOXIB 400 MG: 200 CAPSULE ORAL at 10:31

## 2025-01-09 RX ADMIN — SIMETHICONE 160 MG: 80 TABLET, CHEWABLE ORAL at 17:49

## 2025-01-09 RX ADMIN — ACETAMINOPHEN 975 MG: 325 TABLET ORAL at 10:31

## 2025-01-09 RX ADMIN — MIDAZOLAM HYDROCHLORIDE 2 MG: 1 INJECTION, SOLUTION INTRAMUSCULAR; INTRAVENOUS at 10:48

## 2025-01-09 RX ADMIN — HYDROMORPHONE HYDROCHLORIDE 0.5 MG: 1 INJECTION, SOLUTION INTRAMUSCULAR; INTRAVENOUS; SUBCUTANEOUS at 13:41

## 2025-01-09 RX ADMIN — PROPOFOL 50 MG: 10 INJECTION, EMULSION INTRAVENOUS at 12:34

## 2025-01-09 RX ADMIN — ROCURONIUM 60 MG: 50 INJECTION, SOLUTION INTRAVENOUS at 10:49

## 2025-01-09 RX ADMIN — PROPOFOL 120 MG: 10 INJECTION, EMULSION INTRAVENOUS at 10:49

## 2025-01-09 RX ADMIN — SODIUM CHLORIDE, POTASSIUM CHLORIDE, SODIUM LACTATE AND CALCIUM CHLORIDE 100 ML/HR: 600; 310; 30; 20 INJECTION, SOLUTION INTRAVENOUS at 13:00

## 2025-01-09 RX ADMIN — HYDROMORPHONE HYDROCHLORIDE 0.5 MG: 1 INJECTION, SOLUTION INTRAMUSCULAR; INTRAVENOUS; SUBCUTANEOUS at 13:20

## 2025-01-09 SDOH — ECONOMIC STABILITY: FOOD INSECURITY: WITHIN THE PAST 12 MONTHS, THE FOOD YOU BOUGHT JUST DIDN'T LAST AND YOU DIDN'T HAVE MONEY TO GET MORE.: NEVER TRUE

## 2025-01-09 SDOH — ECONOMIC STABILITY: HOUSING INSECURITY: IN THE LAST 12 MONTHS, WAS THERE A TIME WHEN YOU WERE NOT ABLE TO PAY THE MORTGAGE OR RENT ON TIME?: NO

## 2025-01-09 SDOH — SOCIAL STABILITY: SOCIAL INSECURITY
WITHIN THE LAST YEAR, HAVE YOU BEEN KICKED, HIT, SLAPPED, OR OTHERWISE PHYSICALLY HURT BY YOUR PARTNER OR EX-PARTNER?: NO

## 2025-01-09 SDOH — ECONOMIC STABILITY: HOUSING INSECURITY: IN THE PAST 12 MONTHS, HOW MANY TIMES HAVE YOU MOVED WHERE YOU WERE LIVING?: 1

## 2025-01-09 SDOH — HEALTH STABILITY: MENTAL HEALTH: HOW OFTEN DO YOU HAVE SIX OR MORE DRINKS ON ONE OCCASION?: NEVER

## 2025-01-09 SDOH — SOCIAL STABILITY: SOCIAL INSECURITY: WITHIN THE LAST YEAR, HAVE YOU BEEN AFRAID OF YOUR PARTNER OR EX-PARTNER?: NO

## 2025-01-09 SDOH — ECONOMIC STABILITY: HOUSING INSECURITY: AT ANY TIME IN THE PAST 12 MONTHS, WERE YOU HOMELESS OR LIVING IN A SHELTER (INCLUDING NOW)?: NO

## 2025-01-09 SDOH — ECONOMIC STABILITY: FOOD INSECURITY: HOW HARD IS IT FOR YOU TO PAY FOR THE VERY BASICS LIKE FOOD, HOUSING, MEDICAL CARE, AND HEATING?: NOT HARD AT ALL

## 2025-01-09 SDOH — SOCIAL STABILITY: SOCIAL INSECURITY: ARE YOU OR HAVE YOU BEEN THREATENED OR ABUSED PHYSICALLY, EMOTIONALLY, OR SEXUALLY BY ANYONE?: NO

## 2025-01-09 SDOH — SOCIAL STABILITY: SOCIAL INSECURITY: DO YOU FEEL ANYONE HAS EXPLOITED OR TAKEN ADVANTAGE OF YOU FINANCIALLY OR OF YOUR PERSONAL PROPERTY?: NO

## 2025-01-09 SDOH — HEALTH STABILITY: MENTAL HEALTH: CURRENT SMOKER: 0

## 2025-01-09 SDOH — SOCIAL STABILITY: SOCIAL INSECURITY: WERE YOU ABLE TO COMPLETE ALL THE BEHAVIORAL HEALTH SCREENINGS?: YES

## 2025-01-09 SDOH — HEALTH STABILITY: MENTAL HEALTH: HOW OFTEN DO YOU HAVE A DRINK CONTAINING ALCOHOL?: NEVER

## 2025-01-09 SDOH — SOCIAL STABILITY: SOCIAL INSECURITY
WITHIN THE LAST YEAR, HAVE YOU BEEN RAPED OR FORCED TO HAVE ANY KIND OF SEXUAL ACTIVITY BY YOUR PARTNER OR EX-PARTNER?: NO

## 2025-01-09 SDOH — SOCIAL STABILITY: SOCIAL INSECURITY: WITHIN THE LAST YEAR, HAVE YOU BEEN HUMILIATED OR EMOTIONALLY ABUSED IN OTHER WAYS BY YOUR PARTNER OR EX-PARTNER?: NO

## 2025-01-09 SDOH — SOCIAL STABILITY: SOCIAL INSECURITY: ABUSE: ADULT

## 2025-01-09 SDOH — SOCIAL STABILITY: SOCIAL INSECURITY: HAS ANYONE EVER THREATENED TO HURT YOUR FAMILY OR YOUR PETS?: NO

## 2025-01-09 SDOH — HEALTH STABILITY: MENTAL HEALTH: HOW MANY DRINKS CONTAINING ALCOHOL DO YOU HAVE ON A TYPICAL DAY WHEN YOU ARE DRINKING?: PATIENT DOES NOT DRINK

## 2025-01-09 SDOH — SOCIAL STABILITY: SOCIAL INSECURITY: ARE THERE ANY APPARENT SIGNS OF INJURIES/BEHAVIORS THAT COULD BE RELATED TO ABUSE/NEGLECT?: NO

## 2025-01-09 SDOH — SOCIAL STABILITY: SOCIAL INSECURITY: DO YOU FEEL UNSAFE GOING BACK TO THE PLACE WHERE YOU ARE LIVING?: NO

## 2025-01-09 SDOH — SOCIAL STABILITY: SOCIAL INSECURITY: DOES ANYONE TRY TO KEEP YOU FROM HAVING/CONTACTING OTHER FRIENDS OR DOING THINGS OUTSIDE YOUR HOME?: NO

## 2025-01-09 SDOH — ECONOMIC STABILITY: TRANSPORTATION INSECURITY: IN THE PAST 12 MONTHS, HAS LACK OF TRANSPORTATION KEPT YOU FROM MEDICAL APPOINTMENTS OR FROM GETTING MEDICATIONS?: NO

## 2025-01-09 SDOH — ECONOMIC STABILITY: FOOD INSECURITY: WITHIN THE PAST 12 MONTHS, YOU WORRIED THAT YOUR FOOD WOULD RUN OUT BEFORE YOU GOT THE MONEY TO BUY MORE.: NEVER TRUE

## 2025-01-09 SDOH — SOCIAL STABILITY: SOCIAL INSECURITY: HAVE YOU HAD THOUGHTS OF HARMING ANYONE ELSE?: NO

## 2025-01-09 SDOH — ECONOMIC STABILITY: INCOME INSECURITY: IN THE PAST 12 MONTHS HAS THE ELECTRIC, GAS, OIL, OR WATER COMPANY THREATENED TO SHUT OFF SERVICES IN YOUR HOME?: NO

## 2025-01-09 ASSESSMENT — COGNITIVE AND FUNCTIONAL STATUS - GENERAL
PATIENT BASELINE BEDBOUND: NO
MOBILITY SCORE: 24
DAILY ACTIVITIY SCORE: 24

## 2025-01-09 ASSESSMENT — PAIN SCALES - GENERAL
PAINLEVEL_OUTOF10: 7
PAINLEVEL_OUTOF10: 8
PAINLEVEL_OUTOF10: 7
PAINLEVEL_OUTOF10: 5 - MODERATE PAIN
PAINLEVEL_OUTOF10: 6
PAINLEVEL_OUTOF10: 7
PAINLEVEL_OUTOF10: 5 - MODERATE PAIN
PAINLEVEL_OUTOF10: 7
PAINLEVEL_OUTOF10: 8
PAINLEVEL_OUTOF10: 5 - MODERATE PAIN

## 2025-01-09 ASSESSMENT — LIFESTYLE VARIABLES
AUDIT-C TOTAL SCORE: 0
SKIP TO QUESTIONS 9-10: 1
SKIP TO QUESTIONS 9-10: 1
HOW OFTEN DO YOU HAVE A DRINK CONTAINING ALCOHOL: NEVER
HOW MANY STANDARD DRINKS CONTAINING ALCOHOL DO YOU HAVE ON A TYPICAL DAY: PATIENT DOES NOT DRINK
HOW OFTEN DO YOU HAVE 6 OR MORE DRINKS ON ONE OCCASION: NEVER
AUDIT-C TOTAL SCORE: 0
AUDIT-C TOTAL SCORE: 0

## 2025-01-09 ASSESSMENT — ACTIVITIES OF DAILY LIVING (ADL)
HEARING - LEFT EAR: FUNCTIONAL
PATIENT'S MEMORY ADEQUATE TO SAFELY COMPLETE DAILY ACTIVITIES?: YES
BATHING: INDEPENDENT
HEARING - RIGHT EAR: FUNCTIONAL
GROOMING: INDEPENDENT
JUDGMENT_ADEQUATE_SAFELY_COMPLETE_DAILY_ACTIVITIES: YES
WALKS IN HOME: INDEPENDENT
LACK_OF_TRANSPORTATION: NO
ADEQUATE_TO_COMPLETE_ADL: YES
TOILETING: INDEPENDENT
FEEDING YOURSELF: INDEPENDENT
DRESSING YOURSELF: INDEPENDENT

## 2025-01-09 ASSESSMENT — PAIN DESCRIPTION - DESCRIPTORS: DESCRIPTORS: ACHING

## 2025-01-09 ASSESSMENT — PAIN - FUNCTIONAL ASSESSMENT
PAIN_FUNCTIONAL_ASSESSMENT: 0-10

## 2025-01-09 ASSESSMENT — PATIENT HEALTH QUESTIONNAIRE - PHQ9
2. FEELING DOWN, DEPRESSED OR HOPELESS: NOT AT ALL
SUM OF ALL RESPONSES TO PHQ9 QUESTIONS 1 & 2: 0
1. LITTLE INTEREST OR PLEASURE IN DOING THINGS: NOT AT ALL

## 2025-01-09 ASSESSMENT — PAIN DESCRIPTION - LOCATION: LOCATION: ABDOMEN

## 2025-01-09 NOTE — OP NOTE
ROBOT-ASSISTED LEFT ADRENALECTOMY (L) Operative Note     Date: 2025  OR Location: [facility] OR    Name: Noah Allen, : 1970, Age: 54 y.o., MRN: [id number], Sex: male    Diagnosis  Pre-op Diagnosis      * Adrenal mass (Multi) [E27.8] Post-op Diagnosis     * Adrenal mass (Multi) [E27.8]     Procedures  ROBOT-ASSISTED LEFT ADRENALECTOMY  72841 - AL LAPAROSCOPY ADRENALECTOMY PRTL/COMPL TABDL  Umbilical hernai repair    Surgeons      * Eliseo Payan - Primary    Resident/Fellow/Other Assistant:  Surgeons and Role:     * Lloyd Maria MD - Resident - Assisting     * Luís Shaffer MD - Resident - Assisting    Staff:   Circulator: [place]  Scrub Person: Valencia  Relief Scrub: Sp  Relief Scrub: Gilles  Relief Circulator: Celine    Anesthesia Staff: Anesthesiologist: Suhas Curry MD  CRNA: Man Painting APRN-CRNA  Frontline Breaker: VU Holley-CRNA    Procedure Summary  Anesthesia: General  ASA: III  Estimated Blood Loss: 20 mL  Intra-op Medications:   Administrations occurring from 1120 to 1510 on 25:   Medication Name Total Dose   bupivacaine PF 0.5 % (Marcaine) 0.5 % (5 mg/mL) injection 30 mL   HYDROmorphone (Dilaudid) injection 0.5 mg 1.5 mg   oxygen (O2) therapy Cannot be calculated   fentaNYL (Sublimaze) injection 50 mcg/mL 50 mcg   HYDROmorphone (Dilaudid) injection 1 mg/mL 1 mg   LR bolus Cannot be calculated   ondansetron (Zofran) 2 mg/mL injection 4 mg   propofol (Diprivan) injection 10 mg/mL 80 mg   rocuronium (ZeMuron) 50 mg/5 mL injection 10 mg   sugammadex (Bridion) 200 mg/2 mL injection 200 mg              Anesthesia Record               Intraprocedure I/O Totals          Intake    LR bolus 800.00 mL    Total Intake 800 mL       Output    Urine 100 mL    Est. Blood Loss 20 mL    Total Output 120 mL       Net    Net Volume 680 mL          Specimen:   ID Type Source Tests Collected by Time   1 : LEFT ADRENAL MASS Tissue ADRENAL RESECTION LEFT SURGICAL PATHOLOGY  EXAM Eliseo Payan MD MPH 1/9/2025 1216                 Drains and/or Catheters:   Urethral Catheter Non-latex 16 Fr. (Active)   Site Assessment Clean;Skin intact 01/09/25 1321   Collection Container Standard drainage bag 01/09/25 1321   Securement Method Securing device (Describe) 01/09/25 1321   Output (mL) 2 mL 01/09/25 1321   $ Urethral Catheter Charge Indwelling cath 01/09/25 1321       Findings: Left adrenal gland removed.  No issues.  Retroaortic renal vein    Indications: Noah Allen is an 54 y.o. male who is having surgery for Adrenal mass (Multi) [E27.8]. This was rapidly enlarging.  He saw Dr. Jones.  Functionally this was negative.  Given the rapid growth and the size close to 4 cm, he elected to have this removed.    The patient was seen in the preoperative area. The risks, benefits, complications, treatment options, non-operative alternatives, expected recovery and outcomes were discussed with the patient. The possibilities of reaction to medication, pulmonary aspiration, injury to surrounding structures, bleeding, recurrent infection, the need for additional procedures, failure to diagnose a condition, and creating a complication requiring transfusion or operation were discussed with the patient. The patient concurred with the proposed plan, giving informed consent.  The site of surgery was properly noted/marked if necessary per policy. The patient has been actively warmed in preoperative area. Preoperative antibiotics have been ordered and given within 1 hours of incision. Venous thrombosis prophylaxis have been ordered including bilateral sequential compression devices and chemical prophylaxis    Procedure Details:   The patient was brought into the operating room and patient identification re-confirmed prior to anesthesia in the operating room via standard timeout procedure. Intravenous antibiotics were administered for infection prophylaxis and bilateral lower extremity compression  devices were placed for DVT prevention.      General endotracheal anesthesia was obtained without difficulty and orogastric tube was placed. Graham catheter was placed atraumatically and in a sterile fashion. The patient was then positioned into right lateral decubitus position, LEFT side up. Gel pad roll was supporting the patient's back. Axillary roll was placed below the axilla. Bottom leg was gently flexed and top leg was straight. Legs were  by pillows.  Left arm placed on an arm board. Table was mildly flexed and the patient was then secured to the table with silk tape. All bony prominences were well padded. The patient was then prepped and draped in the usual sterile manner.    The procedure was initiated by establishing a pneumoperitoneum by passage of Veress needle at the level of the umbilicus into the intraperitoneal cavity with appropriate needle position confirmed clinically.  He did have an umbilical hernia so we went through the umbilicus to be able to fix the hernia on the way out. A pneumoperitoneum to 15 mmHg pressure was established without difficulty with the patient showing no adverse hemodynamic or respiratory function change, and an initial trocar was placed two fingerbreadths above the umbilicus in the lateral rectus line using a non-bladed 8-mm Xi robotic trocar.      Through this initial trocar, intraabdominal laparoscopy was performed and no intraabdominal abnormalities were seen, no bleeding, bowel abnormality or injury, or other intraabdominal pathology recognized. At this point, additional trocars were placed. One robotic trocar was placed 2 cm below the costal margin and then two were additionally placed inferiorly.  A 15 mm Airseal was placed between arms 2 and 3.    With all trocars in position in standard configuration, the Da Dejan robot was brought to the field and docked to the trocars to proceed with the operation.    The procedure was started by mobilizing the Line of  Toldt. Colon was mobilized medially. The spleen and tail of pancreas were mobilized off of left renal upper pole without issues.   The splenic artery and vein were very close.  These were mobilized.  The adrenal adenoma came into view.  We opened up Gerotas between the adrenal gland and kidney and found the psoas muscle.  We opened up the superior aspect above the adrenal gland and found the psoas muscle.  We then traced the adrenal gland towards the renal hilum.  He has a retroaortic renal vein so there was no normal landmark.  We found the adrenal vein.  We clipped and divided this.  The adrenal was then mobilized off the posterior abdominal wall.  The vessels supplying the adrenal were divided.  The adrenal was placed in a bag.    The field was inspected and no bleeding was encountered.      The specimen was removed from an extension of the Airseal trocar incision. This incision was closed primarily with 3 #1 Ethibond suture to approximate the anterior fascia in a figure of eight fashion.  The umbilical hernia was closed and fixed by doing this.  The remaining trocar incisions did not require primary fascial closure given their size and location. The overlying soft tissues at all incisions were well irrigated and infused with 0.25% Marcaine for postoperative analgesia. All incisions were subsequently closed at the skin using 4-0 Monocryl and Dermabond.    The patient was then extubated uneventfully in the Operating Room. He was then transferred to the recovery room for routine monitoring.    Complications:  None; patient tolerated the procedure well.    Disposition: PACU - hemodynamically stable.  Condition: stable             Task Performed by SMOOTH First Assist or Physician Assistant:   Rosy DHILLON/NP, was necessary to assist on this case due to the nature of the case and difficulty. During the case Rosy served as my assist by The PA served the role of a bedside assistant due to the lack of a qualified  assistant.  Their role was docking and undocking, port placement and closure, suction, instrument exchanging and passing.        Additional Details:     Attending Attestation:     Eliseo Payan  Phone Number: [phone]

## 2025-01-09 NOTE — CARE PLAN
The clinical goals for the shift include pain control    Problem: Pain - Adult  Goal: Verbalizes/displays adequate comfort level or baseline comfort level  Outcome: Progressing     Problem: Safety - Adult  Goal: Free from fall injury  Outcome: Progressing     Problem: Discharge Planning  Goal: Discharge to home or other facility with appropriate resources  Outcome: Progressing     Problem: Chronic Conditions and Co-morbidities  Goal: Patient's chronic conditions and co-morbidity symptoms are monitored and maintained or improved  Outcome: Progressing

## 2025-01-09 NOTE — ANESTHESIA PREPROCEDURE EVALUATION
Patient: Noah Allen    Procedure Information       Date/Time: 01/09/25 1120    Procedure: ADRENALECTOMY, ROBOT-ASSISTED (Left: Abdomen)    Location: [facility] OR 05 / [facility]    Surgeons: Eliseo Payan MD MPH          54yom here for adrenelectomy. Not a pheo per outpatient endocrinologist.    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  Relevant Problems   Cardiac  Buergers disease   (+) Chest pain   (+) Hyperlipidemia   (+) Hypertension   (+) Ischemic finger   (+) Peripheral vascular disease (CMS-HCC)      Pulmonary   (+) SAAD (obstructive sleep apnea)      Neuro   (+) Cervical radiculopathy   (+) Left-sided low back pain with left-sided sciatica   (+) Lumbar radiculopathy   (+) Situational depression      GI   (+) Gastroesophageal reflux disease      /Renal   (+) Hyponatremia      Endocrine   (+) Obesity   (+) Type 2 diabetes mellitus without complication, without long-term current use of insulin (Multi)      Hematology   (+) Anemia, iron deficiency   (+) Anemia, normocytic normochromic   (+) Anticoagulant long-term use   (+) Coagulation disorder (Multi)      Musculoskeletal   (+) Cervical spondylosis   (+) Cervical stenosis of spine      HEENT   (+) Acute sinusitis      ID   (+) Onychomycosis       Clinical information reviewed:     Meds               NPO Detail:  No data recorded     Physical Exam    Airway  Mallampati: II  TM distance: >3 FB  Neck ROM: full     Cardiovascular - normal exam     Dental - normal exam     Pulmonary - normal exam     Abdominal            Anesthesia Plan    History of general anesthesia?: yes  History of complications of general anesthesia?: no    ASA 3     general     The patient is not a current smoker.    intravenous induction   Anesthetic plan and risks discussed with patient.

## 2025-01-09 NOTE — SIGNIFICANT EVENT
Urology Postoperative Check    Noah Allen  [id number]       Assessment & Plan:  54 y.o. male who is POD 0 from left robotic adrenalectomy with Dr. Payan for adrenal mass.  Patient progressing well postoperatively.    -Nephrectomy pathway  -Warfarin bridge required; will speak with primary care provider in morning  -Restart losartan on discharge    SUBJECTIVE  Pain well controlled  Nausea well controlled, has not vomited  Denies passing gas, not having bowel movements  Voiding via aguiar catheter  Has not ambulated  Patient denies shortness of breath, chest pain, new numbness or tingling or loss of motor function in extremities.      OBJECTIVE  /75   Pulse 79   Temp 36.4 °C (97.5 °F) (Temporal)   Resp 16   Ht 1.829 m (6')   Wt 77.7 kg (171 lb 4.8 oz)   SpO2 94%   BMI 23.23 kg/m²   Physical Exam:  Gen: in no apparent distress  Resp: has a normal respiratory effort   Abd: Abdomen is soft, nontender, and nondistended. Laparoscopic incisions is clean, dry and intact with glue.   Skin: Warm and dry    Denise Gregg MD   Urology  Pager: [phone]

## 2025-01-09 NOTE — INTERVAL H&P NOTE
H&P reviewed. The patient was examined and there are no changes to the H&P.    Lloyd Maria MD  Urology Resident (PGY-5)  Adult Pager [phone]  Pediatric Pager [phone]   
07-Mar-2024 17:25

## 2025-01-09 NOTE — ANESTHESIA POSTPROCEDURE EVALUATION
Patient: Noah Allen    Procedure Summary       Date: 01/09/25 Room / Location: [facility] OR 05 / [facility]    Anesthesia Start: 1046 Anesthesia Stop: 1310    Procedure: ROBOT-ASSISTED LEFT ADRENALECTOMY (Left: Abdomen) Diagnosis:       Adrenal mass (Multi)      (Adrenal mass (Multi) [E27.8])    Surgeons: Eliseo Payan MD MPH Responsible Provider: Suhas Curry MD    Anesthesia Type: general ASA Status: 3            Anesthesia Type: general    Vitals Value Taken Time   /71 01/09/25 1350   Temp 36.2 °C (97.2 °F) 01/09/25 1307   Pulse 76 01/09/25 1401   Resp 12 01/09/25 1401   SpO2 97 % 01/09/25 1401   Vitals shown include unfiled device data.    Anesthesia Post Evaluation    Patient location during evaluation: floor  Patient participation: complete - patient participated  Level of consciousness: awake and alert  Pain management: adequate  Airway patency: patent  Cardiovascular status: acceptable  Respiratory status: acceptable  Hydration status: acceptable  Postoperative Nausea and Vomiting: none        No notable events documented.

## 2025-01-09 NOTE — BRIEF OP NOTE
Date: 2025  OR Location: [facility] OR    Name: Noah Allen, : 1970, Age: 54 y.o., MRN: [id number], Sex: male    Diagnosis  Pre-op Diagnosis      * Adrenal mass (Multi) [E27.8] Post-op Diagnosis     * Adrenal mass (Multi) [E27.8]     Procedures  ROBOT-ASSISTED LEFT ADRENALECTOMY  52304 - ND LAPAROSCOPY ADRENALECTOMY PRTL/COMPL TABDL      Surgeons      * Eliseo Payan - Primary    Resident/Fellow/Other Assistant:  Surgeons and Role:     * Lloyd Maria MD - Resident - Assisting     * Luís Shaffer MD - Resident - Assisting    Staff:   Circulator: [place]  Scrub Person: Valencia  Relief Scrub: Sp  Relief Scrub: Gilles  Relief Circulator: Celine    Anesthesia Staff: Anesthesiologist: Suhas Curry MD  CRNA: Man Painting APRN-CRNA  Frontline Breaker: Rosy Clements APRN-CRNA    Procedure Summary  Anesthesia: General  ASA: III  Estimated Blood Loss: 5mL  Intra-op Medications:   Administrations occurring from 1120 to 1510 on 25:   Medication Name Total Dose   bupivacaine PF 0.5 % (Marcaine) 0.5 % (5 mg/mL) injection 30 mL   HYDROmorphone (Dilaudid) injection 0.5 mg 0.5 mg   oxygen (O2) therapy Cannot be calculated   fentaNYL (Sublimaze) injection 50 mcg/mL 50 mcg   HYDROmorphone (Dilaudid) injection 1 mg/mL 1 mg   LR bolus Cannot be calculated   ondansetron (Zofran) 2 mg/mL injection 4 mg   propofol (Diprivan) injection 10 mg/mL 80 mg   rocuronium (ZeMuron) 50 mg/5 mL injection 10 mg   sugammadex (Bridion) 200 mg/2 mL injection 200 mg              Anesthesia Record               Intraprocedure I/O Totals          Intake    LR bolus 800.00 mL    Total Intake 800 mL       Output    Urine 100 mL    Est. Blood Loss 20 mL    Total Output 120 mL       Net    Net Volume 680 mL          Specimen:   ID Type Source Tests Collected by Time   1 : LEFT ADRENAL MASS Tissue ADRENAL RESECTION LEFT SURGICAL PATHOLOGY EXAM Eliseo Payan MD MPH 2025 1216      Findings: Uncomplicated L  adrenalectomy, grossly negative margins.     Complications:  None; patient tolerated the procedure well.     Disposition: PACU - hemodynamically stable.  Condition: stable  Specimens Collected:   ID Type Source Tests Collected by Time   1 : LEFT ADRENAL MASS Tissue ADRENAL RESECTION LEFT SURGICAL PATHOLOGY EXAM Eliseo Payan MD MPH 1/9/2025 6880     Attending Attestation:     Eliseo Payan  Phone Number: [phone]

## 2025-01-09 NOTE — ANESTHESIA PROCEDURE NOTES
Airway  Date/Time: 1/9/2025 10:50 AM  Urgency: elective    Airway not difficult    Staffing  Performed: CRNA   Authorized by: Suhas Curry MD    Performed by: VU Rosado-KEEGAN  Patient location during procedure: OR    Indications and Patient Condition  Indications for airway management: anesthesia and airway protection  Spontaneous ventilation: present  Sedation level: deep  Preoxygenated: yes  Patient position: sniffing  Mask difficulty assessment: 1 - vent by mask    Final Airway Details  Final airway type: endotracheal airway      Successful airway: ETT  Cuffed: yes   Successful intubation technique: direct laryngoscopy  Endotracheal tube insertion site: oral  Blade: Ana Maria  Blade size: #4  ETT size (mm): 7.5  Cormack-Lehane Classification: grade I - full view of glottis  Placement verified by: chest auscultation and capnometry   Number of attempts at approach: 1

## 2025-01-10 VITALS
HEIGHT: 72 IN | TEMPERATURE: 97.7 F | OXYGEN SATURATION: 93 % | WEIGHT: 171.3 LBS | RESPIRATION RATE: 16 BRPM | SYSTOLIC BLOOD PRESSURE: 153 MMHG | HEART RATE: 75 BPM | DIASTOLIC BLOOD PRESSURE: 80 MMHG | BODY MASS INDEX: 23.2 KG/M2

## 2025-01-10 PROCEDURE — 1170000001 HC PRIVATE ONCOLOGY ROOM DAILY

## 2025-01-10 PROCEDURE — 2500000001 HC RX 250 WO HCPCS SELF ADMINISTERED DRUGS (ALT 637 FOR MEDICARE OP): Performed by: STUDENT IN AN ORGANIZED HEALTH CARE EDUCATION/TRAINING PROGRAM

## 2025-01-10 PROCEDURE — 2500000004 HC RX 250 GENERAL PHARMACY W/ HCPCS (ALT 636 FOR OP/ED): Performed by: STUDENT IN AN ORGANIZED HEALTH CARE EDUCATION/TRAINING PROGRAM

## 2025-01-10 PROCEDURE — 2500000005 HC RX 250 GENERAL PHARMACY W/O HCPCS: Performed by: STUDENT IN AN ORGANIZED HEALTH CARE EDUCATION/TRAINING PROGRAM

## 2025-01-10 PROCEDURE — 96372 THER/PROPH/DIAG INJ SC/IM: CPT | Performed by: STUDENT IN AN ORGANIZED HEALTH CARE EDUCATION/TRAINING PROGRAM

## 2025-01-10 PROCEDURE — 2500000002 HC RX 250 W HCPCS SELF ADMINISTERED DRUGS (ALT 637 FOR MEDICARE OP, ALT 636 FOR OP/ED): Performed by: STUDENT IN AN ORGANIZED HEALTH CARE EDUCATION/TRAINING PROGRAM

## 2025-01-10 PROCEDURE — 2500000004 HC RX 250 GENERAL PHARMACY W/ HCPCS (ALT 636 FOR OP/ED)

## 2025-01-10 RX ORDER — ENOXAPARIN SODIUM 100 MG/ML
80 INJECTION SUBCUTANEOUS ONCE
Status: COMPLETED | OUTPATIENT
Start: 2025-01-10 | End: 2025-01-10

## 2025-01-10 RX ORDER — ACETAMINOPHEN 325 MG/1
650 TABLET ORAL EVERY 6 HOURS PRN
Start: 2025-01-10 | End: 2025-02-09

## 2025-01-10 RX ORDER — OXYCODONE HYDROCHLORIDE 5 MG/1
5 TABLET ORAL EVERY 6 HOURS PRN
Qty: 7 TABLET | Refills: 0 | Status: SHIPPED | OUTPATIENT
Start: 2025-01-10 | End: 2025-01-17

## 2025-01-10 RX ORDER — AMOXICILLIN 250 MG
1 CAPSULE ORAL 2 TIMES DAILY
Qty: 14 TABLET | Refills: 0 | Status: SHIPPED | OUTPATIENT
Start: 2025-01-10 | End: 2025-01-17

## 2025-01-10 RX ORDER — POLYETHYLENE GLYCOL 3350 17 G/17G
17 POWDER, FOR SOLUTION ORAL DAILY
Qty: 7 PACKET | Refills: 0 | Status: SHIPPED | OUTPATIENT
Start: 2025-01-10 | End: 2025-01-17

## 2025-01-10 RX ADMIN — DULOXETINE HYDROCHLORIDE 60 MG: 60 CAPSULE, DELAYED RELEASE ORAL at 10:27

## 2025-01-10 RX ADMIN — OXYCODONE 10 MG: 5 TABLET ORAL at 00:22

## 2025-01-10 RX ADMIN — ACETAMINOPHEN 975 MG: 325 TABLET, FILM COATED ORAL at 04:01

## 2025-01-10 RX ADMIN — SODIUM CHLORIDE, POTASSIUM CHLORIDE, SODIUM LACTATE AND CALCIUM CHLORIDE 100 ML/HR: 600; 310; 30; 20 INJECTION, SOLUTION INTRAVENOUS at 01:29

## 2025-01-10 RX ADMIN — ENOXAPARIN SODIUM 80 MG: 80 INJECTION SUBCUTANEOUS at 10:26

## 2025-01-10 RX ADMIN — ASPIRIN 81 MG: 81 TABLET, COATED ORAL at 10:26

## 2025-01-10 RX ADMIN — SIMETHICONE 160 MG: 80 TABLET, CHEWABLE ORAL at 10:27

## 2025-01-10 RX ADMIN — AMLODIPINE BESYLATE 10 MG: 10 TABLET ORAL at 10:26

## 2025-01-10 RX ADMIN — HEPARIN SODIUM 5000 UNITS: 5000 INJECTION INTRAVENOUS; SUBCUTANEOUS at 06:04

## 2025-01-10 RX ADMIN — GABAPENTIN 900 MG: 300 CAPSULE ORAL at 10:27

## 2025-01-10 RX ADMIN — PANTOPRAZOLE SODIUM 40 MG: 40 TABLET, DELAYED RELEASE ORAL at 06:04

## 2025-01-10 RX ADMIN — LIDOCAINE 4% 1 PATCH: 40 PATCH TOPICAL at 10:27

## 2025-01-10 RX ADMIN — POLYETHYLENE GLYCOL 3350 17 G: 17 POWDER, FOR SOLUTION ORAL at 10:27

## 2025-01-10 RX ADMIN — OXYCODONE 10 MG: 5 TABLET ORAL at 10:25

## 2025-01-10 RX ADMIN — SENNOSIDES AND DOCUSATE SODIUM 2 TABLET: 50; 8.6 TABLET ORAL at 10:27

## 2025-01-10 ASSESSMENT — PAIN SCALES - GENERAL: PAINLEVEL_OUTOF10: 7

## 2025-01-15 ENCOUNTER — ANTICOAGULATION - WARFARIN VISIT (OUTPATIENT)
Dept: PHARMACY | Facility: CLINIC | Age: 55
End: 2025-01-15
Payer: MEDICARE

## 2025-01-15 DIAGNOSIS — I73.1 THROMBOANGIITIS OBLITERANS (BUERGER'S DISEASE): Primary | ICD-10-CM

## 2025-01-15 LAB
POC INR: 1.5
POC PROTHROMBIN TIME: NORMAL

## 2025-01-15 NOTE — PROGRESS NOTES
Coumadin Clinic Visit Note    HPI  Noah Allen is a 54 y.o. male with history of  Thromboangiitis obliterans (Buerger's disease)  who presents today for anticoagulation monitoring and adjustment. Last INR 2.2 on 1/2/25 (2 week follow up - 1 week after surgery).  Current dose: 3.75 mg (7.5 mg x 0.5) every Tue, Thu; 7.5 mg (7.5 mg x 1) all other days   INR goal: 2.0-3.0    Subjective  Patient verified warfarin dose   Missed doses from 1/2 to 1/8 for procedure. Patient notes to have significant abdominal pain from procedure, and only can use the right side of the abdomen for injections. Has been not consistent with the lovenox dosing due to the abdominal pain. I suggested bridging longer until next visit, and patient refuses and will only complete the rest of the syringes that he has left due to the abdominal pain (reports having probably 3 syringes left).  No unusual bruising or bleeding  No changes to medications  Consistent dietary green intake  No recent hospital admissions   No anticipated procedures at this time    Assessment: INR of 1.5 is Subtherapeutic today for goal range of 2.0-3.0 potentially due holding prior to procedure and restarted warfarin on 1/9.  Plan: No changes to warfarin dose today, continue to bridge with syringes that are left as best as possible.  Follow Up: Next appointment 1/20 (less than 1 week)      Yelean Morillo, PharmD

## 2025-01-20 ENCOUNTER — APPOINTMENT (OUTPATIENT)
Dept: PHARMACY | Facility: CLINIC | Age: 55
End: 2025-01-20
Payer: MEDICARE

## 2025-01-21 NOTE — DISCHARGE SUMMARY
Discharge Diagnosis  Adrenal mass (Multi)    Issues Requiring Follow-Up  Surgical pathology    Discharge Meds     Medication List      START taking these medications     * acetaminophen 325 mg tablet; Commonly known as: Tylenol; Take 2   tablets (650 mg) by mouth every 6 hours if needed for mild pain (1 - 3) or   moderate pain (4 - 6).  * This list has 1 medication(s) that are the same as other medications   prescribed for you. Read the directions carefully, and ask your doctor or   other care provider to review them with you.     CONTINUE taking these medications     amLODIPine 10 mg tablet; Commonly known as: Norvasc; TAKE 1 TABLET BY   MOUTH ONCE A DAY   aspirin 81 mg EC tablet   atorvastatin 40 mg tablet; Commonly known as: Lipitor; Take 1 tablet (40   mg) by mouth once daily.   baclofen 10 mg tablet; Commonly known as: Lioresal   cholecalciferol 50 mcg (2,000 unit) capsule; Commonly known as: Vitamin   D-3   dexAMETHasone 1 mg tablet; Commonly known as: Decadron; Take 1 tablet (1   mg) by mouth 1 time for 1 dose. Take 1 tablet by mouth @ 11pm before   morning lab draw   docusate sodium 100 mg capsule; Commonly known as: Colace   DULoxetine 60 mg DR capsule; Commonly known as: Cymbalta   * enoxaparin 80 mg/0.8 mL syringe; Commonly known as: Lovenox; Inject   0.8 mL (80 mg) under the skin every 12 hours.   * enoxaparin 80 mg/0.8 mL syringe; Commonly known as: Lovenox; Inject   0.8 mL (80 mg) under the skin every 12 hours.   ferrous sulfate 325 (65 Fe) MG EC tablet   gabapentin 300 mg capsule; Commonly known as: Neurontin   * losartan 100 mg tablet; Commonly known as: Cozaar; TAKE 1 TABLET BY   MOUTH EVERY DAY   * losartan 100 mg tablet; Commonly known as: Cozaar; Take 1 tablet (100   mg) by mouth once daily.   * losartan 100 mg tablet; Commonly known as: Cozaar; TAKE 1 TABLET BY   MOUTH ONCE A DAY   metFORMIN  mg 24 hr tablet; Commonly known as: Glucophage-XR; Take   1 tablet (500 mg) by mouth 2 times a  day.   omeprazole 40 mg DR capsule; Commonly known as: PriLOSEC; Take 1 capsule   (40 mg) by mouth 2 times a day.   traMADol 50 mg tablet; Commonly known as: Ultram   warfarin 7.5 mg tablet; Commonly known as: Coumadin; Take as directed.   If you are unsure how to take this medication, talk to your nurse or   doctor.; Original instructions: Take 1 tablet (7.5 mg) by mouth once daily   at bedtime.  * This list has 5 medication(s) that are the same as other medications   prescribed for you. Read the directions carefully, and ask your doctor or   other care provider to review them with you.     STOP taking these medications     chlorhexidine 0.12 % solution; Commonly known as: Peridex   chlorhexidine 4 % external liquid; Commonly known as: Hibiclens     ASK your doctor about these medications     * acetaminophen 500 mg tablet; Commonly known as: Tylenol; Take 2   tablets (1,000 mg) by mouth every 6 hours if needed for mild pain (1 - 3).   ciclopirox 8 % solution; Commonly known as: Penlac; APPLY TOPICALLY ONCE   DAILY. REMOVE WEEKLY   methylPREDNISolone 4 mg tablets; Commonly known as: Medrol Dospak; Take   each day's dose with one meal   oxyCODONE 5 mg immediate release tablet; Commonly known as: Roxicodone;   Take 1 tablet (5 mg) by mouth every 6 hours if needed for severe pain (7 -   10) for up to 7 days.; Ask about: Should I take this medication?   polyethylene glycol 17 gram packet; Commonly known as: Glycolax,   Miralax; Take 17 g by mouth once daily for 7 days.; Ask about: Should I   take this medication?   sennosides-docusate sodium 8.6-50 mg tablet; Commonly known as:   Jaja-Colace; Take 1 tablet by mouth 2 times a day for 7 days.; Ask about:   Should I take this medication?  * This list has 1 medication(s) that are the same as other medications   prescribed for you. Read the directions carefully, and ask your doctor or   other care provider to review them with you.       Test Results Pending At  Discharge  Pending Labs       No current pending labs.            Hospital Course   53yo M with left adrenal mass s/p left robotic adrenalectomy on 1/9 with Dr Payan. Post-op course uncomplicated. On POD1 he passed a trial of void, was tolerating PO, and ambulatory. His pain was well-controlled with PO medication. He was restarted on the anticoagulation bridge plan per his PCP. He was discharged home and will follow up for post-op visit in clinic on 2/7 with Dr Payan.    Pertinent Physical Exam At Time of Discharge  Pain well controlled  Denies nausea, abdomen approp tender, soft  Graham catheter removed  MAEx4  Patient denies shortness of breath, chest pain, new numbness or tingling or loss of motor function in extremities.    Outpatient Follow-Up  Future Appointments   Date Time Provider Department Center   1/22/2025  1:45 PM SUZANNE [id number] PHARM ACOAG PHARMACIST [id number] [facility]   2/7/2025  9:20 AM Eliseo Payan MD MPH [id number] [facility]   2/27/2025 11:15 AM Sarah Whitley DPM [id number] [facility]   3/27/2025  1:00 PM Braulio Jain MD [id number] [facility]         Mary Palma, APRN-CNP

## 2025-01-22 ENCOUNTER — ANTICOAGULATION - WARFARIN VISIT (OUTPATIENT)
Dept: PHARMACY | Facility: CLINIC | Age: 55
End: 2025-01-22
Payer: MEDICARE

## 2025-01-22 DIAGNOSIS — I73.1 THROMBOANGIITIS OBLITERANS (BUERGER'S DISEASE): Primary | ICD-10-CM

## 2025-01-22 LAB
POC INR: 2.8
POC PROTHROMBIN TIME: NORMAL

## 2025-01-22 NOTE — PROGRESS NOTES
Noah Allen is a 54 y.o. male with history of  Thromboangiitis obliterans (Buerger's disease)  who presents today for anticoagulation monitoring and adjustment. Last INR 1.5 on 1/15/24 (<1 week follow up)    Current dose: 3.75 mg every Tue, Thu; 7.5 mg all other days   INR goal: 2.0-3.0    Today's INR: 2.8  - therapeutic for this patient     Missed doses since last INR visit: No   Signs or symptoms of clotting and/or stroke: No   Any hematuria, epistaxis, rectal bleeding: No   Changes in diet, alcohol, or tobacco use: Eating less, but wants to eat more.  Reviewed medication list and drug allergies with patient, updated any medication additions or modifications accordingly - no changes  Acetaminophen intake: takes two 325 mg tabs in AM and PM  Any medical or dental procedures scheduled at this time:  No   Patient is still experiencing a lot of pain.    Plan: Patient was instructed to take same weekly dose   Follow-up: 4 weeks - therapeutic     Bri Costa, PharmD  1/22/2025 1:52 PM

## 2025-01-30 ENCOUNTER — TELEPHONE (OUTPATIENT)
Dept: PRIMARY CARE | Facility: CLINIC | Age: 55
End: 2025-01-30
Payer: MEDICARE

## 2025-02-07 ENCOUNTER — APPOINTMENT (OUTPATIENT)
Dept: UROLOGY | Facility: CLINIC | Age: 55
End: 2025-02-07
Payer: MEDICARE

## 2025-02-07 VITALS — DIASTOLIC BLOOD PRESSURE: 90 MMHG | HEART RATE: 103 BPM | SYSTOLIC BLOOD PRESSURE: 129 MMHG

## 2025-02-07 DIAGNOSIS — E27.8 ADRENAL MASS (MULTI): Primary | ICD-10-CM

## 2025-02-07 PROCEDURE — 4010F ACE/ARB THERAPY RXD/TAKEN: CPT | Performed by: STUDENT IN AN ORGANIZED HEALTH CARE EDUCATION/TRAINING PROGRAM

## 2025-02-07 PROCEDURE — 3080F DIAST BP >= 90 MM HG: CPT | Performed by: STUDENT IN AN ORGANIZED HEALTH CARE EDUCATION/TRAINING PROGRAM

## 2025-02-07 PROCEDURE — 99024 POSTOP FOLLOW-UP VISIT: CPT | Performed by: STUDENT IN AN ORGANIZED HEALTH CARE EDUCATION/TRAINING PROGRAM

## 2025-02-07 PROCEDURE — 3074F SYST BP LT 130 MM HG: CPT | Performed by: STUDENT IN AN ORGANIZED HEALTH CARE EDUCATION/TRAINING PROGRAM

## 2025-02-07 NOTE — ASSESSMENT & PLAN NOTE
55 y.o. male s/p left Robotic Adrenalectomy. We reviewed and discussed his pathology in detail. Pathology showed a hemorrhagic pseudocyst. He is doing well.     PLAN:  Follow up as needed.  Continue follow up with Dr. Jones, endocrinology

## 2025-02-07 NOTE — PROGRESS NOTES
Subjective    Noah Allen is a 55 y.o. male s/p left Robotic Adrenalectomy who presents for POV.    He had an adrenalectomy for an enlarging tumour. Pathology showed a hemorrhagic pseudocyst.    He reports recovering well. He is taking a stool softener     CT chest abdomen pelvis showed a low-attenuation lesion involving left adrenal gland measuring up to 3.1 x 3.4 cm size, significantly increased in size since previous  exam from 07/06/2023 from 15 x 13 mm previously. The right adrenal  gland within normal limits.     He is being followed by Dr. Jones, endocrinology.     Review of Systems    All systems were reviewed. Anything negative was noted in the HPI.    Objective   Physical Exam  Gen: No acute distress      Psych: Alert and oriented x3      Neuro:  Normal ROM     Resp: Nonlabored respirations      CV: Regular rate and rhythm      Abd: S, NT, ND.     : Deferred     Skin: Warm, dry and intact without rashes      Lymphatics: No peripheral edema           Past Medical History:   Diagnosis Date    Adrenal disease (Multi)     Adrenal mass (Multi)     Alcohol abuse     quit 12/1/22    Anemia, unspecified     Iron deficiency anemia    Salinas's esophagus     On PPI, last EGD in Nov. 2023, next due Nov. 2026    Buerger's disease (CMS-HCC)     managed by PCP Dr. Jain: on Coumadin    Cervical radiculopathy     Cervical spondylosis     Chronic pain     CKD (chronic kidney disease)     CKD stage 3, follows with nephrology    Clotting disorder (Multi)     Contusion of left front wall of thorax, initial encounter 10/23/2021    Contusion of left chest wall, initial encounter    Depression     Disease of spinal cord, unspecified 04/02/2018    Cervical myelopathy    DM (diabetes mellitus) (Multi)     A1C 6.0 on 9/5/24    ED (erectile dysfunction)     Essential (primary) hypertension 09/25/2018    HTN (hypertension), benign    Former smoker     GERD (gastroesophageal reflux disease)     Hematuria     microscopic  hematuria    HLD (hyperlipidemia)     Hyponatremia     Lightheadedness     Long term (current) use of anticoagulants 04/04/2018    Chronic anticoagulation    Lumbar radiculopathy     Numbness     SAAD (obstructive sleep apnea)     Palpitations     Phimosis of penis     PVD (peripheral vascular disease) (CMS-HCC)     Spinal stenosis     Type 2 diabetes mellitus     Vitamin D deficiency          Past Surgical History:   Procedure Laterality Date    CERVICAL FUSION      COLONOSCOPY      KNEE SURGERY  06/02/2015    Knee Surgery    OTHER SURGICAL HISTORY  01/26/2018    Anterior Spinal Diskectomy, Osteophytectomy Cerv Interspace    PENILE PROSTHESIS IMPLANT  06/19/2024    UPPER GASTROINTESTINAL ENDOSCOPY           Assessment & Plan  Adrenal mass (Multi)  55 y.o. male s/p left Robotic Adrenalectomy. We reviewed and discussed his pathology in detail. Pathology showed a hemorrhagic pseudocyst. He is doing well.     PLAN:  Follow up as needed.  Continue follow up with Dr. Jones, endocrinology                                     Scribe Attestation  By signing my name below, I, Samantha Villatoro   attest that this documentation has been prepared under the direction and in the presence of Eliseo Payan MD MPH

## 2025-02-13 ENCOUNTER — ANTICOAGULATION - WARFARIN VISIT (OUTPATIENT)
Dept: PHARMACY | Facility: CLINIC | Age: 55
End: 2025-02-13
Payer: MEDICARE

## 2025-02-13 DIAGNOSIS — I73.1 THROMBOANGIITIS OBLITERANS (BUERGER'S DISEASE): Primary | ICD-10-CM

## 2025-02-13 LAB
POC INR: 4.3
POC PROTHROMBIN TIME: NORMAL

## 2025-02-13 PROCEDURE — 99212 OFFICE O/P EST SF 10 MIN: CPT

## 2025-02-13 PROCEDURE — 85610 PROTHROMBIN TIME: CPT | Mod: QW

## 2025-02-13 NOTE — PROGRESS NOTES
No bleeding or unusual bruising.  Medications and doses verified.  No scheduled procedures at this time.  INR=4.3   Plan: Hold dose today and decrease weekly dose by 8.3% (1/2 tab)  Follow up INR check in 2 weeks.

## 2025-02-19 ENCOUNTER — APPOINTMENT (OUTPATIENT)
Dept: PHARMACY | Facility: CLINIC | Age: 55
End: 2025-02-19
Payer: MEDICARE

## 2025-02-27 ENCOUNTER — ANTICOAGULATION - WARFARIN VISIT (OUTPATIENT)
Dept: PHARMACY | Facility: CLINIC | Age: 55
End: 2025-02-27
Payer: MEDICARE

## 2025-02-27 ENCOUNTER — APPOINTMENT (OUTPATIENT)
Dept: PHARMACY | Facility: CLINIC | Age: 55
End: 2025-02-27
Payer: MEDICARE

## 2025-02-27 ENCOUNTER — OFFICE VISIT (OUTPATIENT)
Dept: PODIATRY | Facility: CLINIC | Age: 55
End: 2025-02-27
Payer: MEDICARE

## 2025-02-27 DIAGNOSIS — I73.1 THROMBOANGIITIS OBLITERANS (BUERGER'S DISEASE): Primary | ICD-10-CM

## 2025-02-27 DIAGNOSIS — B35.1 ONYCHOMYCOSIS: Primary | ICD-10-CM

## 2025-02-27 DIAGNOSIS — M79.671 FOOT PAIN, BILATERAL: ICD-10-CM

## 2025-02-27 DIAGNOSIS — M79.672 FOOT PAIN, BILATERAL: ICD-10-CM

## 2025-02-27 LAB
POC INR: 2.2
POC PROTHROMBIN TIME: NORMAL

## 2025-02-27 PROCEDURE — 4010F ACE/ARB THERAPY RXD/TAKEN: CPT | Performed by: PODIATRIST

## 2025-02-27 PROCEDURE — 1036F TOBACCO NON-USER: CPT | Performed by: PODIATRIST

## 2025-02-27 PROCEDURE — 99212 OFFICE O/P EST SF 10 MIN: CPT

## 2025-02-27 PROCEDURE — 85610 PROTHROMBIN TIME: CPT | Mod: QW

## 2025-02-27 PROCEDURE — 99214 OFFICE O/P EST MOD 30 MIN: CPT | Performed by: PODIATRIST

## 2025-02-27 NOTE — PROGRESS NOTES
No bleeding or unusual bruising.  Medications and doses verified.  Pt may start a medication for toe fungus, will call clinic with med name.  No scheduled procedures at this time.  INR=2.2   Plan: Continue same weekly dose.  Follow up INR check in 4 weeks.

## 2025-02-27 NOTE — PROGRESS NOTES
Chief Complaint   Patient presents with    NAIL CARE     PATIENT HERE FOR NAIL CARE      HPI: Patient is complaining of painful elongated fungal toenails especially the hallux.  Patient states that he really wants to go on oral antifungal medication.  Patient is on warfarin for Buerger's disease.    Phyiscal Exam  Patient alert, oriented, no acute distress    VASC: +2/4 pedal pulses B/L.  CFT brisk all digits.  Skin temperature is warm to cool proximal distal bilateral foot.  No ischemic changes to the toes noted bilaterally.    NEURO: Vibratory diminished 1st MPJ B/L.  Light touch intact B/L.   5.07 [place] monofilament intact B/L.    DERM:Nails 1,2,3,5 L and 1,2,4,5 R are thickened, discolored, crumbly, painful with subungual debris. Pain on palpation to the nails.  All toenails are elongated.  No acute paronychias noted.  No cellulitis noted.     MUSCULOSKEL: +5/5 muscle strength B/L.    No ROM 1st MPJ R, limited ROM 1st MPJ L  Large exostosis noted 1st MPJ R    Assessment and Plan  #1  Onychomycosis  Debrided all nails without complications  Failed topical Penlac therapy  Multiple questions asked and answered regarding fungal nail treatments  Discussed oral antifungal treatment with Coumadin pharmacist in detail.  Coumadin pharmacist is fine with patient going on oral Lamisil he would have to follow-up 7 days after starting the medication and she would monitor him closely throughout the 90 days.  Discussed this plan in detail with patient.  Patient does need LFTs first  Rx: LFTs  P.o. Lamisil pending results  Follow-up 2-3 months

## 2025-02-28 LAB
ALT SERPL-CCNC: 11 U/L (ref 9–46)
AST SERPL-CCNC: 14 U/L (ref 10–35)

## 2025-03-01 ENCOUNTER — TELEPHONE (OUTPATIENT)
Dept: PODIATRY | Facility: CLINIC | Age: 55
End: 2025-03-01
Payer: MEDICARE

## 2025-03-01 DIAGNOSIS — B35.1 ONYCHOMYCOSIS: Primary | ICD-10-CM

## 2025-03-01 RX ORDER — TERBINAFINE HYDROCHLORIDE 250 MG/1
250 TABLET ORAL DAILY
Qty: 90 TABLET | Refills: 0 | Status: SHIPPED | OUTPATIENT
Start: 2025-03-01 | End: 2025-05-30

## 2025-03-01 NOTE — TELEPHONE ENCOUNTER
Reviewed ALT, AST, both are within normal levels.  Rx:lamisil 250 mg T PO every day x 90 days.  Repeat AST, ALT in 6 weeks.  Patient follow up In coumadin clinic in 1 week.  Follow up in office 3 months.

## 2025-03-10 ENCOUNTER — ANTICOAGULATION - WARFARIN VISIT (OUTPATIENT)
Dept: PHARMACY | Facility: CLINIC | Age: 55
End: 2025-03-10
Payer: MEDICARE

## 2025-03-10 DIAGNOSIS — I73.1 THROMBOANGIITIS OBLITERANS (BUERGER'S DISEASE): Primary | ICD-10-CM

## 2025-03-10 LAB
POC INR: 2.4
POC PROTHROMBIN TIME: NORMAL

## 2025-03-10 PROCEDURE — 99212 OFFICE O/P EST SF 10 MIN: CPT

## 2025-03-10 PROCEDURE — 85610 PROTHROMBIN TIME: CPT | Mod: QW

## 2025-03-10 NOTE — PROGRESS NOTES
No bleeding or unusual bruising.  Medications and doses verified.  Last week started Terbinafine 250mg daily x3 months.  No scheduled procedures at this time.  INR=2.4   Plan: Continue same weekly dose.  Follow up INR check in 4 weeks.

## 2025-03-20 DIAGNOSIS — E11.9 TYPE 2 DIABETES MELLITUS WITHOUT COMPLICATIONS: ICD-10-CM

## 2025-03-20 DIAGNOSIS — I10 ESSENTIAL (PRIMARY) HYPERTENSION: ICD-10-CM

## 2025-03-26 ENCOUNTER — APPOINTMENT (OUTPATIENT)
Dept: PHARMACY | Facility: CLINIC | Age: 55
End: 2025-03-26
Payer: MEDICARE

## 2025-03-27 ENCOUNTER — APPOINTMENT (OUTPATIENT)
Dept: PRIMARY CARE | Facility: CLINIC | Age: 55
End: 2025-03-27
Payer: MEDICARE

## 2025-03-27 VITALS
SYSTOLIC BLOOD PRESSURE: 116 MMHG | WEIGHT: 171 LBS | BODY MASS INDEX: 23.16 KG/M2 | HEIGHT: 72 IN | DIASTOLIC BLOOD PRESSURE: 72 MMHG

## 2025-03-27 DIAGNOSIS — I73.1 THROMBOANGIITIS OBLITERANS (BUERGER'S DISEASE): ICD-10-CM

## 2025-03-27 DIAGNOSIS — I10 HYPERTENSION, UNSPECIFIED TYPE: ICD-10-CM

## 2025-03-27 DIAGNOSIS — Z87.891 FORMER CIGARETTE SMOKER: ICD-10-CM

## 2025-03-27 DIAGNOSIS — E78.2 MIXED HYPERLIPIDEMIA: ICD-10-CM

## 2025-03-27 DIAGNOSIS — K59.09 OTHER CONSTIPATION: Primary | ICD-10-CM

## 2025-03-27 DIAGNOSIS — K21.9 GASTROESOPHAGEAL REFLUX DISEASE, UNSPECIFIED WHETHER ESOPHAGITIS PRESENT: ICD-10-CM

## 2025-03-27 DIAGNOSIS — I10 ESSENTIAL (PRIMARY) HYPERTENSION: ICD-10-CM

## 2025-03-27 DIAGNOSIS — E11.9 TYPE 2 DIABETES MELLITUS WITHOUT COMPLICATIONS: ICD-10-CM

## 2025-03-27 PROCEDURE — G2211 COMPLEX E/M VISIT ADD ON: HCPCS | Performed by: STUDENT IN AN ORGANIZED HEALTH CARE EDUCATION/TRAINING PROGRAM

## 2025-03-27 PROCEDURE — 99214 OFFICE O/P EST MOD 30 MIN: CPT | Performed by: STUDENT IN AN ORGANIZED HEALTH CARE EDUCATION/TRAINING PROGRAM

## 2025-03-27 PROCEDURE — 3074F SYST BP LT 130 MM HG: CPT | Performed by: STUDENT IN AN ORGANIZED HEALTH CARE EDUCATION/TRAINING PROGRAM

## 2025-03-27 PROCEDURE — 3008F BODY MASS INDEX DOCD: CPT | Performed by: STUDENT IN AN ORGANIZED HEALTH CARE EDUCATION/TRAINING PROGRAM

## 2025-03-27 PROCEDURE — 3078F DIAST BP <80 MM HG: CPT | Performed by: STUDENT IN AN ORGANIZED HEALTH CARE EDUCATION/TRAINING PROGRAM

## 2025-03-27 PROCEDURE — 4010F ACE/ARB THERAPY RXD/TAKEN: CPT | Performed by: STUDENT IN AN ORGANIZED HEALTH CARE EDUCATION/TRAINING PROGRAM

## 2025-03-27 PROCEDURE — 1036F TOBACCO NON-USER: CPT | Performed by: STUDENT IN AN ORGANIZED HEALTH CARE EDUCATION/TRAINING PROGRAM

## 2025-03-27 RX ORDER — METFORMIN HYDROCHLORIDE 500 MG/1
500 TABLET, EXTENDED RELEASE ORAL 2 TIMES DAILY
Qty: 180 TABLET | Refills: 1 | Status: SHIPPED | OUTPATIENT
Start: 2025-03-27

## 2025-03-27 RX ORDER — AMOXICILLIN 250 MG
1 CAPSULE ORAL DAILY
Qty: 30 TABLET | Refills: 3 | Status: SHIPPED | OUTPATIENT
Start: 2025-03-27 | End: 2025-07-25

## 2025-03-27 RX ORDER — OMEPRAZOLE 40 MG/1
40 CAPSULE, DELAYED RELEASE ORAL 2 TIMES DAILY
Qty: 180 CAPSULE | Refills: 1 | Status: SHIPPED | OUTPATIENT
Start: 2025-03-27

## 2025-03-27 RX ORDER — LOSARTAN POTASSIUM 100 MG/1
100 TABLET ORAL DAILY
Qty: 90 TABLET | Refills: 1 | Status: SHIPPED | OUTPATIENT
Start: 2025-03-27

## 2025-03-27 RX ORDER — AMLODIPINE BESYLATE 10 MG/1
10 TABLET ORAL DAILY
Qty: 90 TABLET | Refills: 1 | Status: SHIPPED | OUTPATIENT
Start: 2025-03-27

## 2025-03-27 ASSESSMENT — PATIENT HEALTH QUESTIONNAIRE - PHQ9
1. LITTLE INTEREST OR PLEASURE IN DOING THINGS: NOT AT ALL
2. FEELING DOWN, DEPRESSED OR HOPELESS: NOT AT ALL
SUM OF ALL RESPONSES TO PHQ9 QUESTIONS 1 AND 2: 0

## 2025-03-27 NOTE — PROGRESS NOTES
Subjective   Patient ID: Noah Allen is a 55 y.o. male who presents for Follow-up.    HPI   Routine FUV. Had L adrenalectomy 01/2025 for enlarging L adrenal lesion, pathology showing hemorrhagic pseudocyst. Feeling well, denies any acute complaints. Having some constipation, had success with doc-senna while recently in the hospital so is interested in trying it at home. He is taking tramadol for neck/back pain.    Review of Systems  12 point ROS reviewed and otherwise negative.    Objective   /72   Ht 1.829 m (6')   Wt 77.6 kg (171 lb)   BMI 23.19 kg/m²     Physical Exam  GEN: conversant, NAD  HEENT: PERRL, EOMI, MMM, TMs clear b/l  NECK: supple  CV: S1, S2, RRR  PULM: CTAB  ABD: soft, NT, ND  NEURO: no new gross focal deficits  EXT: no sig LE edema  PSYCH: appropriate affect    Assessment/Plan   #Adrenal lesion: s/p L adrenalectomy 01/2025, pathology showing hemorrhagic pseudocyst.     #Contstipation: advised trial of routine MiraLax.     #microscopic hematuria  -Follows with Nephrology and urology; done w/ Urology; can follow up again if any problems     #T2DM  :: A1c 6.0%, improved after alcohol cessation and weight loss     #previous EtOH Use DIsorder  - Following w/ AA  - Has abstained from EtOH since 12/1/2022     #Depression- stable on Duloxetine, follows with psychiatry     #HTN- stable, maintained on amlodipine and losartan.     #Neck Surgeries, Neuropathy- Follows neurosurgery, maintained on baclofen and gabapentin; workman's comp; no further needs at this time  #Buerger's Disease- Maintained on coumadin, quit smoking 5 years ago, follows with INR clinic  #GERD, Salinas's esophagus- On PPI, last EGD in Nov. 2023, next due Nov. 2026  #HLD- On atorvastatin 40mg  #CKD- [facility]rology at [facility]   #SAAD: seeing sleep medicine, CPAP has been advised     #Former cigarette smoker: quit ~2019. Last underwent LDCT in 8/2024; no suspicious nodules      #Health Maintenance  Has had  colonoscopy 2020 (repeat due in 10 years), repeat EGD for Salinas's due Nov. 2026 (pt prefers to call GI provider to schedule this), has COVID vaccines  LDCT screening for lung cancer next due in 8/2025  Longitudinal care. Labs reviewed.     RTC- 3-6 month    Trainee role: Resident    I saw and evaluated the patient. I personally obtained the key and critical portions of the history and physical exam or was physically present for key and critical portions performed by the trainee. I reviewed the trainee's documentation and discussed the patient with the trainee. I agree with the trainee's medical decision making as documented on the trainee's notes.    Braulio Jain M.D.

## 2025-04-07 ENCOUNTER — ANTICOAGULATION - WARFARIN VISIT (OUTPATIENT)
Dept: PHARMACY | Facility: CLINIC | Age: 55
End: 2025-04-07
Payer: MEDICARE

## 2025-04-07 DIAGNOSIS — I73.1 THROMBOANGIITIS OBLITERANS (BUERGER'S DISEASE): Primary | ICD-10-CM

## 2025-04-07 LAB
POC INR: 2
POC PROTHROMBIN TIME: NORMAL

## 2025-04-07 PROCEDURE — 99212 OFFICE O/P EST SF 10 MIN: CPT | Performed by: PHARMACIST

## 2025-04-07 PROCEDURE — 85610 PROTHROMBIN TIME: CPT | Mod: QW | Performed by: PHARMACIST

## 2025-04-07 NOTE — PROGRESS NOTES
Coumadin Clinic Visit Note    Patient presents today for anticoagulation monitoring and adjustment.  Patient verified warfarin dosing schedule  Patient denies missing any doses  Patient denies unusual bruising or bleeding  Patient denies changes to medications, alcohol or tobacco use.  Consistent dietary green intake  There are no anticipated procedures at this time  INR Therapeutic today at 2.0  No changes to warfarin dose today  Next appointment 4 weeks      Makayla Junior, PharmD

## 2025-04-12 DIAGNOSIS — B35.1 ONYCHOMYCOSIS: ICD-10-CM

## 2025-04-30 LAB
NON-UH HIE CREATININE 24 HR, U: 1 GM/24HR (ref 0.95–2.49)
NON-UH HIE U CRETN: 43.7 MG/DL
NON-UH HIE U VOLCRET: 2400 ML

## 2025-05-01 LAB
Lab: 0.11 NMOL/L (ref 0–0.49)
Lab: 1.32 NMOL/L (ref 0–0.89)
Lab: ABNORMAL

## 2025-05-06 LAB — Lab: NORMAL

## 2025-05-08 ENCOUNTER — APPOINTMENT (OUTPATIENT)
Dept: UROLOGY | Facility: HOSPITAL | Age: 55
End: 2025-05-08
Payer: MEDICARE

## 2025-05-08 ENCOUNTER — ANTICOAGULATION - WARFARIN VISIT (OUTPATIENT)
Dept: PHARMACY | Facility: CLINIC | Age: 55
End: 2025-05-08
Payer: MEDICARE

## 2025-05-08 ENCOUNTER — OFFICE VISIT (OUTPATIENT)
Dept: UROLOGY | Facility: HOSPITAL | Age: 55
End: 2025-05-08
Payer: MEDICARE

## 2025-05-08 ENCOUNTER — APPOINTMENT (OUTPATIENT)
Dept: PHARMACY | Facility: CLINIC | Age: 55
End: 2025-05-08
Payer: MEDICARE

## 2025-05-08 ENCOUNTER — PROCEDURE VISIT (OUTPATIENT)
Dept: PODIATRY | Facility: CLINIC | Age: 55
End: 2025-05-08
Payer: MEDICARE

## 2025-05-08 DIAGNOSIS — I73.1 THROMBOANGIITIS OBLITERANS (BUERGER'S DISEASE): Primary | ICD-10-CM

## 2025-05-08 DIAGNOSIS — N40.1 BENIGN PROSTATIC HYPERPLASIA WITH NOCTURIA: ICD-10-CM

## 2025-05-08 DIAGNOSIS — R35.1 BENIGN PROSTATIC HYPERPLASIA WITH NOCTURIA: ICD-10-CM

## 2025-05-08 DIAGNOSIS — B35.1 ONYCHOMYCOSIS: ICD-10-CM

## 2025-05-08 DIAGNOSIS — Z96.0 S/P INSERTION OF PENILE IMPLANT: Primary | ICD-10-CM

## 2025-05-08 DIAGNOSIS — M79.671 FOOT PAIN, BILATERAL: Primary | ICD-10-CM

## 2025-05-08 DIAGNOSIS — N40.0 BENIGN PROSTATIC HYPERPLASIA WITHOUT LOWER URINARY TRACT SYMPTOMS: ICD-10-CM

## 2025-05-08 DIAGNOSIS — M79.672 FOOT PAIN, BILATERAL: Primary | ICD-10-CM

## 2025-05-08 LAB
POC INR: 2.2 (ref 0.9–1.1)
POC PROTHROMBIN TIME: ABNORMAL (ref 9.3–12.5)

## 2025-05-08 PROCEDURE — 99213 OFFICE O/P EST LOW 20 MIN: CPT | Performed by: PODIATRIST

## 2025-05-08 PROCEDURE — 85610 PROTHROMBIN TIME: CPT | Mod: QW | Performed by: PHARMACIST

## 2025-05-08 PROCEDURE — G2211 COMPLEX E/M VISIT ADD ON: HCPCS | Performed by: UROLOGY

## 2025-05-08 PROCEDURE — 99214 OFFICE O/P EST MOD 30 MIN: CPT | Performed by: UROLOGY

## 2025-05-08 PROCEDURE — 4010F ACE/ARB THERAPY RXD/TAKEN: CPT | Performed by: UROLOGY

## 2025-05-08 PROCEDURE — 1036F TOBACCO NON-USER: CPT | Performed by: UROLOGY

## 2025-05-08 PROCEDURE — 99212 OFFICE O/P EST SF 10 MIN: CPT | Performed by: PHARMACIST

## 2025-05-08 RX ORDER — TAMSULOSIN HYDROCHLORIDE 0.4 MG/1
0.4 CAPSULE ORAL DAILY
Qty: 30 CAPSULE | Refills: 11 | Status: SHIPPED | OUTPATIENT
Start: 2025-05-08 | End: 2026-05-08

## 2025-05-08 RX ORDER — CICLOPIROX 80 MG/ML
SOLUTION TOPICAL DAILY
Qty: 6.6 ML | Refills: 11 | Status: SHIPPED | OUTPATIENT
Start: 2025-05-08

## 2025-05-08 NOTE — PROGRESS NOTES
Verified current dose with pt.    No new meds or med changes since last visit.    Pt denies unusual bleed/bruise.  No upcoming procedures.  Inr = 2.2  Continue same dose and check again in 4 weeks.

## 2025-05-08 NOTE — PROGRESS NOTES
FUV    HISTORY OF PRESENT ILLNESS:   Noah Allen is a 55 y.o. male who is being seen today for fuv    Pt had IPP placed on 6/19/24.  [employer] Titan IPP IP approach, size 18 cm with no rear-tip extenders. Classic pump. 125 mL reservoir in space of [place] (left). 100 mL in the system. discharged home with drain.    5/8 doing well since last visit. Had an interval L adrenalectomy (hemorrhagic pseudocyst).  Implant cycles well. Has scrotal pain with inflation and deflation.  Endorses weak stream, nocturia 6x/night. Endorses an urgency sensation prior to voiding at night. Unable to increase force of stream with pushing.     PAST MEDICAL HISTORY:  Past Medical History:   Diagnosis Date    Adrenal disease (Multi)     Adrenal mass (Multi)     Alcohol abuse     quit 12/1/22    Anemia, unspecified     Iron deficiency anemia    Salinas's esophagus     On PPI, last EGD in Nov. 2023, next due Nov. 2026    Buerger's disease (CMS-HCC)     managed by PCP Dr. Jain: on Coumadin    Cervical radiculopathy     Cervical spondylosis     Chronic pain     CKD (chronic kidney disease)     CKD stage 3, follows with nephrology    Clotting disorder (Multi)     Contusion of left front wall of thorax, initial encounter 10/23/2021    Contusion of left chest wall, initial encounter    Depression     Disease of spinal cord, unspecified 04/02/2018    Cervical myelopathy    DM (diabetes mellitus) (Multi)     A1C 6.0 on 9/5/24    ED (erectile dysfunction)     Essential (primary) hypertension 09/25/2018    HTN (hypertension), benign    Former smoker     GERD (gastroesophageal reflux disease)     Hematuria     microscopic hematuria    HLD (hyperlipidemia)     Hyponatremia     Lightheadedness     Long term (current) use of anticoagulants 04/04/2018    Chronic anticoagulation    Lumbar radiculopathy     Numbness     SAAD (obstructive sleep apnea)     Palpitations     Phimosis of penis     PVD (peripheral vascular disease) (CMS-[facility])     Spinal  stenosis     Type 2 diabetes mellitus     Vitamin D deficiency        PAST SURGICAL HISTORY:  Past Surgical History:   Procedure Laterality Date    CERVICAL FUSION      COLONOSCOPY      KNEE SURGERY  06/02/2015    Knee Surgery    OTHER SURGICAL HISTORY  01/26/2018    Anterior Spinal Diskectomy, Osteophytectomy Cerv Interspace    PENILE PROSTHESIS IMPLANT  06/19/2024    UPPER GASTROINTESTINAL ENDOSCOPY          ALLERGIES:   Allergies   Allergen Reactions    Penicillins Anaphylaxis    Sulfa (Sulfonamide Antibiotics) Anaphylaxis    Lisinopril Cough     cough        MEDICATIONS:   Current Outpatient Medications   Medication Instructions    acetaminophen (TYLENOL) 1,000 mg, oral, Every 6 hours PRN    amLODIPine (NORVASC) 10 mg, oral, Daily    amLODIPine (NORVASC) 10 mg, oral, Daily    aspirin 81 mg, Daily    atorvastatin (LIPITOR) 40 mg, oral, Daily    baclofen (Lioresal) 10 mg tablet 1 tablet, 2 times daily PRN    cholecalciferol (Vitamin D-3) 50 mcg (2,000 unit) capsule 1 tablet, Daily    ciclopirox (Penlac) 8 % solution Topical, Daily, Remove weekly    dexAMETHasone (DECADRON) 1 mg, oral, Once, Take 1 tablet by mouth @ 11pm before morning lab draw    docusate sodium (COLACE) 100 mg, 2 times daily    DULoxetine (CYMBALTA) 60 mg, 2 times daily    enoxaparin (LOVENOX) 80 mg, subcutaneous, Every 12 hours    enoxaparin (LOVENOX) 80 mg, subcutaneous, Every 12 hours    ferrous sulfate 65 mg, Daily with breakfast    gabapentin (Neurontin) 300 mg capsule 3 capsules, 2 times daily    losartan (COZAAR) 100 mg, oral, Daily    losartan (COZAAR) 100 mg, oral, Daily    losartan (COZAAR) 100 mg, oral, Daily    metFORMIN XR (GLUCOPHAGE-XR) 500 mg, oral, 2 times daily    metFORMIN XR (GLUCOPHAGE-XR) 500 mg, oral, 2 times daily    methylPREDNISolone (Medrol Dospak) 4 mg tablets Take each day's dose with one meal    omeprazole (PRILOSEC) 40 mg, oral, 2 times daily    sennosides-docusate sodium (Senokot-S) 8.6-50 mg tablet 1 tablet, oral,  Daily    terbinafine (LAMISIL) 250 mg, oral, Daily    traMADol (ULTRAM) 100 mg, 2 times daily    warfarin (COUMADIN) 7.5 mg, oral, Nightly        PHYSICAL EXAM:  There were no vitals taken for this visit.  Constitutional: Patient appears well-developed and well-nourished. No distress.    Pulmonary/Chest: Effort normal. No respiratory distress.   Abdominal: Soft, ND NT  : Tips in good position, pump midline and dependent.  Cycles well. Incision well healed  Musculoskeletal: Normal range of motion.    Neurological: Alert and oriented to person, place, and time.  Psychiatric: Normal mood and affect. Behavior is normal. Thought content normal.      Labs  Lab Results   Component Value Date    PSA 1.00 09/05/2024     Lab Results   Component Value Date    GFRMALE >90 09/28/2023     Lab Results   Component Value Date    CREATININE 1.42 (H) 01/02/2025     Lab Results   Component Value Date    CHOL 182 09/05/2024     Lab Results   Component Value Date    HDL 44.5 09/05/2024     Lab Results   Component Value Date    CHHDL 4.1 09/05/2024     Lab Results   Component Value Date    LDLF 99 04/18/2023     Lab Results   Component Value Date    VLDL 48 (H) 09/05/2024     Lab Results   Component Value Date    TRIG 242 (H) 09/05/2024     Lab Results   Component Value Date    HGBA1C 6.0 (H) 09/05/2024       Lab Results   Component Value Date    HCT 39.1 (L) 01/02/2025       Assessment:      1. S/P insertion of penile implant              Noah Allen is a 55 y.o. male here for 1 yr follow up after IPP.    Doing well, has some scrotal pain with inflation.   Urinary complaints: weak stream, nocturia x6.    Plan:   Daily cycling to lessen scrotal pain with inflation.  Flomax trial  FU in 1 yr    Carter Mercado MD

## 2025-05-08 NOTE — PROGRESS NOTES
Chief Complaint   Patient presents with    Follow-up     F/U MEDS FOR FOOT     HPI: Patient is here for follow-up overall p.o. Lamisil therapy.  He is just starting on his third month of the medication.  He is tolerating it well.  He has upcoming blood work to be drawn.  Patient is requesting a topical antifungal to use in combination with the oral.      Phyiscal Exam  Patient alert, oriented, no acute distress     VASC: +2/4 pedal pulses B/L.  CFT brisk all digits.  Skin temperature is warm to cool proximal distal bilateral foot.  No ischemic changes to the toes noted bilaterally.     NEURO: Vibratory diminished 1st MPJ B/L.  Light touch intact B/L.   5.07 [place]-Vanessa monofilament intact B/L.     DERM:Nails 1,2,3,5 L and 1,2,4,5 R are thickened, discolored, crumbly, painful with subungual debris. Pain on palpation to the nails.  No clear bases noted on fungal nails.  No cellulitis noted.      MUSCULOSKEL: +5/5 muscle strength B/L.    No ROM 1st MPJ R, limited ROM 1st MPJ L  Large exostosis noted 1st MPJ R     Assessment and Plan  #1  Onychomycosis  Continue with the oral Lamisil  Have LFT blood draws done as previously prescribed  Rx: Penlac apply topically  Will use the topical Penlac in combination with the oral Lamisil  Discussed that typically the nail has to grow for a good 12 to 16 weeks for me to visually see the change when using oral Lamisil  Patient to follow-up in 2 months

## 2025-05-28 ENCOUNTER — OFFICE VISIT (OUTPATIENT)
Dept: PRIMARY CARE | Facility: CLINIC | Age: 55
End: 2025-05-28
Payer: MEDICARE

## 2025-05-28 VITALS
HEIGHT: 72 IN | DIASTOLIC BLOOD PRESSURE: 74 MMHG | BODY MASS INDEX: 24.24 KG/M2 | SYSTOLIC BLOOD PRESSURE: 140 MMHG | WEIGHT: 179 LBS

## 2025-05-28 DIAGNOSIS — F10.20 ALCOHOL USE DISORDER, SEVERE, DEPENDENCE (MULTI): ICD-10-CM

## 2025-05-28 DIAGNOSIS — E11.9 TYPE 2 DIABETES MELLITUS WITHOUT COMPLICATION, WITHOUT LONG-TERM CURRENT USE OF INSULIN: ICD-10-CM

## 2025-05-28 DIAGNOSIS — R55 SYNCOPE, UNSPECIFIED SYNCOPE TYPE: ICD-10-CM

## 2025-05-28 DIAGNOSIS — Z87.891 FORMER CIGARETTE SMOKER: Primary | ICD-10-CM

## 2025-05-28 DIAGNOSIS — G95.9 DISEASE OF SPINAL CORD, UNSPECIFIED: ICD-10-CM

## 2025-05-28 DIAGNOSIS — I48.91 ATRIAL FIBRILLATION, UNSPECIFIED TYPE (MULTI): ICD-10-CM

## 2025-05-28 DIAGNOSIS — E27.9 LESION OF ADRENAL GLAND (MULTI): ICD-10-CM

## 2025-05-28 DIAGNOSIS — N18.32 CHRONIC KIDNEY DISEASE, STAGE 3B (MULTI): ICD-10-CM

## 2025-05-28 DIAGNOSIS — I73.1 THROMBOANGIITIS OBLITERANS (BUERGER'S DISEASE): ICD-10-CM

## 2025-05-28 DIAGNOSIS — F10.939 ALCOHOL WITHDRAWAL SYNDROME WITH COMPLICATION (MULTI): ICD-10-CM

## 2025-05-28 DIAGNOSIS — I10 ESSENTIAL (PRIMARY) HYPERTENSION: ICD-10-CM

## 2025-05-28 PROCEDURE — 4010F ACE/ARB THERAPY RXD/TAKEN: CPT | Performed by: STUDENT IN AN ORGANIZED HEALTH CARE EDUCATION/TRAINING PROGRAM

## 2025-05-28 PROCEDURE — 3078F DIAST BP <80 MM HG: CPT | Performed by: STUDENT IN AN ORGANIZED HEALTH CARE EDUCATION/TRAINING PROGRAM

## 2025-05-28 PROCEDURE — 1036F TOBACCO NON-USER: CPT | Performed by: STUDENT IN AN ORGANIZED HEALTH CARE EDUCATION/TRAINING PROGRAM

## 2025-05-28 PROCEDURE — 3008F BODY MASS INDEX DOCD: CPT | Performed by: STUDENT IN AN ORGANIZED HEALTH CARE EDUCATION/TRAINING PROGRAM

## 2025-05-28 PROCEDURE — 3077F SYST BP >= 140 MM HG: CPT | Performed by: STUDENT IN AN ORGANIZED HEALTH CARE EDUCATION/TRAINING PROGRAM

## 2025-05-28 PROCEDURE — 99214 OFFICE O/P EST MOD 30 MIN: CPT | Performed by: STUDENT IN AN ORGANIZED HEALTH CARE EDUCATION/TRAINING PROGRAM

## 2025-05-28 PROCEDURE — G2211 COMPLEX E/M VISIT ADD ON: HCPCS | Performed by: STUDENT IN AN ORGANIZED HEALTH CARE EDUCATION/TRAINING PROGRAM

## 2025-05-28 RX ORDER — LOSARTAN POTASSIUM 50 MG/1
50 TABLET ORAL DAILY
Qty: 90 TABLET | Refills: 3 | Status: SHIPPED | OUTPATIENT
Start: 2025-05-28

## 2025-05-28 ASSESSMENT — PATIENT HEALTH QUESTIONNAIRE - PHQ9
SUM OF ALL RESPONSES TO PHQ9 QUESTIONS 1 AND 2: 0
2. FEELING DOWN, DEPRESSED OR HOPELESS: NOT AT ALL
1. LITTLE INTEREST OR PLEASURE IN DOING THINGS: NOT AT ALL

## 2025-05-28 NOTE — PROGRESS NOTES
Subjective   Patient ID: Noah Allen is a 55 y.o. male who presents for Follow-up (BP issues).    HPI   Patient says he had an episode of syncope 5 days ago. He stood up while at an AA meeting, fell to the ground. He thinks he lost consciousness for a few seconds. His BP at that time was in the 70s or 80s systolic. He denies CP or SoB.    He stopped taking amlodipine and losartan since then. BP at home was initially 80s-90s systolic, but for the last couple of days has increased to 140s-150s sytolic off of medication.  Review of Systems  12-point ROS reviewed and was negative unless otherwise noted in HPI.    Objective   /74   Ht 1.829 m (6')   Wt 81.2 kg (179 lb)   BMI 24.28 kg/m²     Physical Exam  GEN: conversant, NAD  HEENT: PERRL, EOMI, MMM  NECK: supple, no carotid bruits appreciated b/l  CV: S1, S2, RRR  PULM: CTAB  ABD: soft, NT, ND  NEURO: no new gross focal deficits  EXT: no sig LE edema  PSYCH: appropriate affect    Assessment/Plan     #HTN- stop amlodipine in setting of hypotension. Decrease losartan to 50mg daily.     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      #Contstipation: advised trial of routine MiraLax.     #microscopic hematuria  -Follows with Nephrology and urology; done w/ Urology; can follow up again if any problems     #T2DM  :: A1c 6.0%, improved after alcohol cessation and weight loss     #previous EtOH Use DIsorder  - Following w/ AA  - Has abstained from EtOH since 12/1/2022     #Depression- stable on Duloxetine, follows with psychiatry     #Neck Surgeries, Neuropathy- Follows neurosurgery, maintained on baclofen and gabapentin; workman's comp; no further needs at this time  #Buerger's Disease- Maintained on coumadin, quit smoking 5 years ago, follows with INR clinic  #GERD, Salinas's esophagus- On PPI, last EGD in Nov. 2023, next due Nov. 2026  #HLD- On atorvastatin 40mg  #CKD- Follows with nephrology at [facility]    #SAAD: seeing sleep medicine, CPAP has been advised     #Former cigarette smoker: quit ~2019. Last underwent LDCT in 8/2024; no suspicious nodules      #Health Maintenance  Has had colonoscopy 2020 (repeat due in 10 years), repeat EGD for Salinas's due Nov. 2026 (pt prefers to call GI provider to schedule this), has COVID vaccines  LDCT screening for lung cancer next due in 8/2025  Longitudinal care.      RTC- 2-3 months

## 2025-06-05 ENCOUNTER — ANTICOAGULATION - WARFARIN VISIT (OUTPATIENT)
Dept: PHARMACY | Facility: CLINIC | Age: 55
End: 2025-06-05
Payer: MEDICARE

## 2025-06-05 DIAGNOSIS — I73.1 THROMBOANGIITIS OBLITERANS (BUERGER'S DISEASE): Primary | ICD-10-CM

## 2025-06-05 LAB
POC INR: 2 (ref 0.9–1.1)
POC PROTHROMBIN TIME: ABNORMAL (ref 9.3–12.5)

## 2025-06-05 PROCEDURE — 99212 OFFICE O/P EST SF 10 MIN: CPT | Performed by: PHARMACIST

## 2025-06-05 PROCEDURE — 85610 PROTHROMBIN TIME: CPT | Mod: QW | Performed by: PHARMACIST

## 2025-06-05 NOTE — PROGRESS NOTES
Verified current dose with pt.    No new meds since last visit.  Amlodipine was discontinued and losartan cut in half  Pt denies unusual bleed/bruise.  Possible back procedure in July.  Not sure, no date yet  No missed doses  No dietary changes  Inr = 2  Continue same dose and check again in 5 weeks.

## 2025-06-25 DIAGNOSIS — D68.9 COAGULATION DISORDER (MULTI): ICD-10-CM

## 2025-06-25 DIAGNOSIS — Z79.01 ANTICOAGULANT LONG-TERM USE: Primary | ICD-10-CM

## 2025-07-03 DIAGNOSIS — B35.1 ONYCHOMYCOSIS: ICD-10-CM

## 2025-07-07 RX ORDER — TERBINAFINE HYDROCHLORIDE 250 MG/1
250 TABLET ORAL DAILY
Qty: 90 TABLET | Refills: 0 | OUTPATIENT
Start: 2025-07-07

## 2025-07-08 ENCOUNTER — OFFICE VISIT (OUTPATIENT)
Dept: PODIATRY | Facility: CLINIC | Age: 55
End: 2025-07-08
Payer: MEDICARE

## 2025-07-08 DIAGNOSIS — M79.671 FOOT PAIN, BILATERAL: ICD-10-CM

## 2025-07-08 DIAGNOSIS — M79.672 FOOT PAIN, BILATERAL: ICD-10-CM

## 2025-07-08 DIAGNOSIS — B35.1 ONYCHOMYCOSIS: Primary | ICD-10-CM

## 2025-07-08 PROCEDURE — 99213 OFFICE O/P EST LOW 20 MIN: CPT | Performed by: PODIATRIST

## 2025-07-08 PROCEDURE — 1036F TOBACCO NON-USER: CPT | Performed by: PODIATRIST

## 2025-07-08 PROCEDURE — 4010F ACE/ARB THERAPY RXD/TAKEN: CPT | Performed by: PODIATRIST

## 2025-07-08 NOTE — PROGRESS NOTES
Chief Complaint   Patient presents with    Follow-up     NAIL CARE     HPI: Patient is here for follow-up  p.o. Lamisil therapy.  Patient is also using topical Penlac daily.  He finished the full 90 days of the oral Lamisil.  He tolerated it well.    Phyiscal Exam  Patient alert, oriented, no acute distress     VASC: +2/4 pedal pulses B/L.  CFT brisk all digits.  Skin temperature is warm to cool proximal distal bilateral foot.  No ischemic changes to the toes noted bilaterally.     NEURO: Vibratory diminished 1st MPJ B/L.  Light touch intact B/L.   5.07 [place]essa monofilament intact B/L.     DERM:Nails 1,2,3,5 L and 1,2,4,5 R are thickened, discolored, crumbly, painful with subungual debris. Pain on palpation to the nails.  Clear base noted on digits 1, 5 left and 2 right.  No cellulitis noted.      MUSCULOSKEL: +5/5 muscle strength B/L.    No ROM 1st MPJ R, limited ROM 1st MPJ L  Large exostosis noted 1st MPJ R     Assessment and Plan  #1  Onychomycosis  Continue with Penlac apply topically  Debrided nails as needed without complications  Footcare reviewed  Patient to follow-up in 3 months

## 2025-07-10 ENCOUNTER — APPOINTMENT (OUTPATIENT)
Dept: PHARMACY | Facility: CLINIC | Age: 55
End: 2025-07-10
Payer: MEDICARE

## 2025-07-10 ENCOUNTER — OFFICE VISIT (OUTPATIENT)
Facility: CLINIC | Age: 55
End: 2025-07-10
Payer: MEDICARE

## 2025-07-10 VITALS
BODY MASS INDEX: 24.11 KG/M2 | TEMPERATURE: 97.7 F | SYSTOLIC BLOOD PRESSURE: 112 MMHG | HEART RATE: 86 BPM | WEIGHT: 178 LBS | DIASTOLIC BLOOD PRESSURE: 68 MMHG | HEIGHT: 72 IN

## 2025-07-10 DIAGNOSIS — M54.16 LUMBAR RADICULOPATHY: Primary | ICD-10-CM

## 2025-07-10 DIAGNOSIS — M47.816 LUMBAR SPONDYLOSIS: ICD-10-CM

## 2025-07-10 DIAGNOSIS — M54.50 LOW BACK PAIN ASSOCIATED WITH A SPINAL DISORDER OTHER THAN RADICULOPATHY OR SPINAL STENOSIS: ICD-10-CM

## 2025-07-10 PROCEDURE — 3078F DIAST BP <80 MM HG: CPT | Performed by: STUDENT IN AN ORGANIZED HEALTH CARE EDUCATION/TRAINING PROGRAM

## 2025-07-10 PROCEDURE — 4010F ACE/ARB THERAPY RXD/TAKEN: CPT | Performed by: STUDENT IN AN ORGANIZED HEALTH CARE EDUCATION/TRAINING PROGRAM

## 2025-07-10 PROCEDURE — 99215 OFFICE O/P EST HI 40 MIN: CPT | Performed by: STUDENT IN AN ORGANIZED HEALTH CARE EDUCATION/TRAINING PROGRAM

## 2025-07-10 PROCEDURE — 3008F BODY MASS INDEX DOCD: CPT | Performed by: STUDENT IN AN ORGANIZED HEALTH CARE EDUCATION/TRAINING PROGRAM

## 2025-07-10 PROCEDURE — 1036F TOBACCO NON-USER: CPT | Performed by: STUDENT IN AN ORGANIZED HEALTH CARE EDUCATION/TRAINING PROGRAM

## 2025-07-10 PROCEDURE — 3074F SYST BP LT 130 MM HG: CPT | Performed by: STUDENT IN AN ORGANIZED HEALTH CARE EDUCATION/TRAINING PROGRAM

## 2025-07-10 ASSESSMENT — PAIN SCALES - GENERAL: PAINLEVEL_OUTOF10: 8
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
## 2025-07-11 ENCOUNTER — ANTICOAGULATION - WARFARIN VISIT (OUTPATIENT)
Dept: PHARMACY | Facility: CLINIC | Age: 55
End: 2025-07-11
Payer: MEDICARE

## 2025-07-11 DIAGNOSIS — Z79.01 ANTICOAGULANT LONG-TERM USE: ICD-10-CM

## 2025-07-11 DIAGNOSIS — I73.1 THROMBOANGIITIS OBLITERANS (BUERGER'S DISEASE): Primary | ICD-10-CM

## 2025-07-11 DIAGNOSIS — D68.9 COAGULATION DISORDER (MULTI): ICD-10-CM

## 2025-07-11 LAB
POC INR: 2.8 (ref 0.9–1.1)
POC PROTHROMBIN TIME: ABNORMAL (ref 9.3–12.5)

## 2025-07-11 PROCEDURE — 99212 OFFICE O/P EST SF 10 MIN: CPT

## 2025-07-11 PROCEDURE — 85610 PROTHROMBIN TIME: CPT | Mod: QW

## 2025-07-11 NOTE — PROGRESS NOTES
Noah Allen is a 55 y.o. male with history of Thromboangiitis obliterans who presents today for anticoagulation monitoring and adjustment. Last INR 2.00 on 6/5/25 (5 week follow up)    Current dose: 3.75 mg (7.5 mg x 0.5) every Tue, Thu, Sat; 7.5 mg (7.5 mg x 1) all other days   INR goal: 2.0-3.0    Today's INR: 2.8      Subjective  Patient verified warfarin dose: Yes   Missed doses since last INR visit: : No   Unusual bruising or bleeding: Yes, cut self shaving  Changes to medications: No   Consistent dietary green intake: Yes   Recent illness or antibiotic use: No   Recent hospital admissions: No   Anticipated procedures at this time: Yes, back surgery coming   Change in acetaminophen/ibuprofen use: No   Change in smoking/alcohol use: No     Assessment: INR of 2.8 is therapeutic today for goal range of 2.0-3.0   Plan: No adjustment to warfarin dose today, continue regimen as stated above   Follow-up: 5 weeks     Tori Estrada  PGY-1 Pharmacy Resident  7/11/2025 11:30 AM

## 2025-07-18 DIAGNOSIS — M47.816 LUMBAR SPONDYLOSIS: ICD-10-CM

## 2025-07-18 DIAGNOSIS — M54.16 LUMBAR RADICULOPATHY: Primary | ICD-10-CM

## 2025-07-18 DIAGNOSIS — R79.1 ABNORMAL COAGULATION PROFILE: ICD-10-CM

## 2025-07-18 DIAGNOSIS — M54.50 LOW BACK PAIN, UNSPECIFIED BACK PAIN LATERALITY, UNSPECIFIED CHRONICITY, UNSPECIFIED WHETHER SCIATICA PRESENT: ICD-10-CM

## 2025-07-18 RX ORDER — TRANEXAMIC ACID 650 MG/1
1300 TABLET ORAL ONCE
OUTPATIENT
Start: 2025-07-18 | End: 2025-07-18

## 2025-07-18 RX ORDER — CELECOXIB 400 MG/1
400 CAPSULE ORAL ONCE
OUTPATIENT
Start: 2025-07-18 | End: 2025-07-18

## 2025-07-18 RX ORDER — ACETAMINOPHEN 325 MG/1
975 TABLET ORAL ONCE
OUTPATIENT
Start: 2025-07-18 | End: 2025-07-18

## 2025-07-27 DIAGNOSIS — I73.1 THROMBOANGIITIS OBLITERANS (BUERGER'S DISEASE): ICD-10-CM

## 2025-07-28 RX ORDER — WARFARIN 7.5 MG/1
7.5 TABLET ORAL NIGHTLY
Qty: 90 TABLET | Refills: 3 | Status: SHIPPED | OUTPATIENT
Start: 2025-07-28

## 2025-07-30 ENCOUNTER — TELEPHONE (OUTPATIENT)
Dept: RADIOLOGY | Facility: HOSPITAL | Age: 55
End: 2025-07-30
Payer: MEDICARE

## 2025-07-30 NOTE — TELEPHONE ENCOUNTER
VM message left requesting patient to call and schedule the follow up Lung Cancer Screening exam. Requested they schedule with radiology @ [phone].Encouraged  to return my call @  [phone] for any additional assistance. EPIC My Chart message was left for patient with scheduling instructions.

## 2025-08-06 ENCOUNTER — APPOINTMENT (OUTPATIENT)
Dept: PRIMARY CARE | Facility: CLINIC | Age: 55
End: 2025-08-06
Payer: MEDICARE

## 2025-08-06 VITALS
HEART RATE: 72 BPM | TEMPERATURE: 97.9 F | DIASTOLIC BLOOD PRESSURE: 84 MMHG | BODY MASS INDEX: 25.06 KG/M2 | OXYGEN SATURATION: 97 % | SYSTOLIC BLOOD PRESSURE: 129 MMHG | WEIGHT: 185 LBS | RESPIRATION RATE: 12 BRPM | HEIGHT: 72 IN

## 2025-08-06 DIAGNOSIS — E78.5 HYPERLIPIDEMIA, UNSPECIFIED: ICD-10-CM

## 2025-08-06 DIAGNOSIS — Z12.5 SCREENING FOR PROSTATE CANCER: ICD-10-CM

## 2025-08-06 DIAGNOSIS — K59.00 CONSTIPATION, UNSPECIFIED CONSTIPATION TYPE: Primary | ICD-10-CM

## 2025-08-06 DIAGNOSIS — I73.1 THROMBOANGIITIS OBLITERANS (BUERGER'S DISEASE): ICD-10-CM

## 2025-08-06 DIAGNOSIS — K21.9 GASTROESOPHAGEAL REFLUX DISEASE, UNSPECIFIED WHETHER ESOPHAGITIS PRESENT: ICD-10-CM

## 2025-08-06 DIAGNOSIS — Z87.891 FORMER CIGARETTE SMOKER: Primary | ICD-10-CM

## 2025-08-06 DIAGNOSIS — E78.2 MIXED HYPERLIPIDEMIA: ICD-10-CM

## 2025-08-06 DIAGNOSIS — E11.9 TYPE 2 DIABETES MELLITUS WITHOUT COMPLICATION, WITHOUT LONG-TERM CURRENT USE OF INSULIN: ICD-10-CM

## 2025-08-06 DIAGNOSIS — I10 ESSENTIAL (PRIMARY) HYPERTENSION: ICD-10-CM

## 2025-08-06 PROCEDURE — 3008F BODY MASS INDEX DOCD: CPT | Performed by: STUDENT IN AN ORGANIZED HEALTH CARE EDUCATION/TRAINING PROGRAM

## 2025-08-06 PROCEDURE — 99214 OFFICE O/P EST MOD 30 MIN: CPT | Performed by: STUDENT IN AN ORGANIZED HEALTH CARE EDUCATION/TRAINING PROGRAM

## 2025-08-06 PROCEDURE — 3074F SYST BP LT 130 MM HG: CPT | Performed by: STUDENT IN AN ORGANIZED HEALTH CARE EDUCATION/TRAINING PROGRAM

## 2025-08-06 PROCEDURE — G2211 COMPLEX E/M VISIT ADD ON: HCPCS | Performed by: STUDENT IN AN ORGANIZED HEALTH CARE EDUCATION/TRAINING PROGRAM

## 2025-08-06 PROCEDURE — 3079F DIAST BP 80-89 MM HG: CPT | Performed by: STUDENT IN AN ORGANIZED HEALTH CARE EDUCATION/TRAINING PROGRAM

## 2025-08-06 PROCEDURE — 4010F ACE/ARB THERAPY RXD/TAKEN: CPT | Performed by: STUDENT IN AN ORGANIZED HEALTH CARE EDUCATION/TRAINING PROGRAM

## 2025-08-06 RX ORDER — AMOXICILLIN 250 MG
1 CAPSULE ORAL DAILY
Qty: 30 TABLET | Refills: 0 | Status: SHIPPED | OUTPATIENT
Start: 2025-08-06 | End: 2025-09-05

## 2025-08-06 RX ORDER — WARFARIN 7.5 MG/1
7.5 TABLET ORAL NIGHTLY
Qty: 90 TABLET | Refills: 3 | Status: SHIPPED | OUTPATIENT
Start: 2025-08-06

## 2025-08-06 RX ORDER — OMEPRAZOLE 40 MG/1
40 CAPSULE, DELAYED RELEASE ORAL 2 TIMES DAILY
Qty: 180 CAPSULE | Refills: 1 | Status: SHIPPED | OUTPATIENT
Start: 2025-08-06

## 2025-08-06 RX ORDER — AMOXICILLIN 250 MG
1 CAPSULE ORAL DAILY
COMMUNITY
End: 2025-08-06 | Stop reason: SDUPTHER

## 2025-08-06 RX ORDER — ATORVASTATIN CALCIUM 40 MG/1
40 TABLET, FILM COATED ORAL DAILY
Qty: 90 TABLET | Refills: 1 | Status: SHIPPED | OUTPATIENT
Start: 2025-08-06

## 2025-08-06 ASSESSMENT — PAIN SCALES - GENERAL: PAINLEVEL_OUTOF10: 7

## 2025-08-06 NOTE — PROGRESS NOTES
Subjective   Patient ID: Noah Allen is a 55 y.o. male who presents for Hypertension (BP follow up).    HPI   Routine fu.    Med refill.    He is planning to have surgery on his neck in a couple of months.    BP has been stable at home since adjusting his meds at our last visit.  Review of Systems  12-point ROS reviewed and was negative unless otherwise noted in HPI.    Objective   /84 (BP Location: Left arm, Patient Position: Sitting, BP Cuff Size: Adult)   Pulse 72   Temp 36.6 °C (97.9 °F) (Oral)   Resp 12   Ht 1.829 m (6')   Wt 83.9 kg (185 lb)   SpO2 97%   BMI 25.09 kg/m²     Physical Exam  GEN: conversant, NAD  HEENT: PERRL, EOMI, MMM  NECK: supple, no carotid bruits appreciated b/l  CV: S1, S2, RRR  PULM: CTAB  ABD: soft, NT, ND  NEURO: no new gross focal deficits  EXT: no sig LE edema  PSYCH: appropriate affect    Assessment/Plan     #HTN- stopped amlodipine in setting of hypotension. Continue losartan 50mg daily.      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      #Contstipation: advised trial of routine MiraLax.     #microscopic hematuria  -Follows with Nephrology and urology; done w/ Urology; can follow up again if any problems     #T2DM  :: A1c 6.0%, improved after alcohol cessation and weight loss     #previous EtOH Use Disorder in remission  - Following w/ AA  - Has abstained from EtOH since 12/1/2022     #Depression- stable on Duloxetine, follows with psychiatry     #Neck Surgeries, Neuropathy- Follows neurosurgery, maintained on baclofen and gabapentin; workman's comp; Planning for neck surgery 10/2025.    #Buerger's Disease- Maintained on coumadin, quit smoking 5 years ago, follows with INR clinic  #GERD, Salinas's esophagus- On PPI, last EGD in Nov. 2023, next due Nov. 2026  #HLD- On atorvastatin 40mg  #CKD- Follows [facility] nephrology at [facility]   #SAAD: seeing sleep medicine, CPAP has been advised     #Former cigarette smoker:  quit ~2019. Last underwent LDCT in 8/2024; repeat is scheduled     #Health Maintenance  Has had colonoscopy 2020 (repeat due in 10 years), repeat EGD for Salinas's due Nov. 2026 (pt prefers to call GI provider to schedule this), has COVID vaccines  LDCT screening for lung cancer next due in 8/2025, this was ordered  Longitudinal care.      RTC 4 mo

## 2025-08-15 ENCOUNTER — ANTICOAGULATION - WARFARIN VISIT (OUTPATIENT)
Dept: PHARMACY | Facility: CLINIC | Age: 55
End: 2025-08-15
Payer: MEDICARE

## 2025-08-15 ENCOUNTER — HOSPITAL ENCOUNTER (OUTPATIENT)
Dept: RADIOLOGY | Facility: CLINIC | Age: 55
Discharge: HOME | End: 2025-08-15
Payer: MEDICARE

## 2025-08-15 DIAGNOSIS — D68.9 COAGULATION DISORDER (MULTI): ICD-10-CM

## 2025-08-15 DIAGNOSIS — I73.1 THROMBOANGIITIS OBLITERANS (BUERGER'S DISEASE): Primary | ICD-10-CM

## 2025-08-15 DIAGNOSIS — Z87.891 FORMER CIGARETTE SMOKER: ICD-10-CM

## 2025-08-15 DIAGNOSIS — Z79.01 ANTICOAGULANT LONG-TERM USE: ICD-10-CM

## 2025-08-15 LAB
POC INR: 2.3 (ref 0.9–1.1)
POC PROTHROMBIN TIME: ABNORMAL (ref 9.3–12.5)

## 2025-08-15 PROCEDURE — 71271 CT THORAX LUNG CANCER SCR C-: CPT

## 2025-08-15 PROCEDURE — 99212 OFFICE O/P EST SF 10 MIN: CPT | Mod: 25 | Performed by: PHARMACIST

## 2025-08-15 PROCEDURE — 85610 PROTHROMBIN TIME: CPT | Mod: QW | Performed by: PHARMACIST

## 2025-08-21 ENCOUNTER — TELEPHONE (OUTPATIENT)
Dept: NEUROLOGY | Facility: CLINIC | Age: 55
End: 2025-08-21
Payer: MEDICARE

## 2025-09-25 ENCOUNTER — APPOINTMENT (OUTPATIENT)
Dept: PRIMARY CARE | Facility: CLINIC | Age: 55
End: 2025-09-25
Payer: MEDICARE

## 2025-10-16 ENCOUNTER — APPOINTMENT (OUTPATIENT)
Dept: NEUROLOGY | Facility: CLINIC | Age: 55
End: 2025-10-16
Payer: COMMERCIAL

## 2025-12-03 ENCOUNTER — APPOINTMENT (OUTPATIENT)
Dept: PRIMARY CARE | Facility: CLINIC | Age: 55
End: 2025-12-03
Payer: MEDICARE

## (undated) DEVICE — COVER, TIP HOT SHEARS ENDOWRIST

## (undated) DEVICE — SUTURE, CHROMIC GUT, 3-0, SH 27"

## (undated) DEVICE — SUTURE, VICRYL, 3-0, 27 IN, SH

## (undated) DEVICE — SUTURE, MONOCRYL 2-0 UR-6 27 IN, VIOLET

## (undated) DEVICE — SYRINGE, 50 CC, LUER LOCK

## (undated) DEVICE — SPONGE, LAP, XRAY DECT, 18IN X 18IN, W/MASTER DMT, STERILE

## (undated) DEVICE — DRAPE, COLUMN, DAVINCI XI

## (undated) DEVICE — SUTURE, PROLENE, 3-0, 30 IN, SH

## (undated) DEVICE — MANIFOLD, 4 PORT NEPTUNE STANDARD

## (undated) DEVICE — DRAPE, SHEET, ENDOSCOPY, GENERAL, FENESTRATED, ARMBOARD COVER, 98 X 123.5 IN, DISPOSABLE, LF, STERILE

## (undated) DEVICE — SUTURE, ETHILON, 3-0, 18 IN, PS2, BLACK

## (undated) DEVICE — SUTURE, MONOCRYL, 4-0, 18 IN, PS2, UNDYED

## (undated) DEVICE — GOWN, ASTOUND, XL

## (undated) DEVICE — SEALER, VESSEL, EXTENDED

## (undated) DEVICE — SUTURE, CHROMIC 4-0 RB1

## (undated) DEVICE — SUTURE, PDS II, 1, 36 IN, CT-1, VIOLET

## (undated) DEVICE — NEEDLE, BLOOD COLLECTION, W/12 IN TUBING, SAF-T WING, 21 G X 0.75 IN

## (undated) DEVICE — TUBING SET, TRI-LUMEN, FILTERED, F/AIRSEAL

## (undated) DEVICE — FORCEPS, BIPOLAR MARYLAND, DAVINCI XI

## (undated) DEVICE — FORCEPS, PROGRASP, DAVINCI XI

## (undated) DEVICE — DRESSING, NON-ADHERENT, TELFA, 3 X 8 IN, NS

## (undated) DEVICE — PROTECTOR, NERVE, ULNAR, PINK

## (undated) DEVICE — SLEEVE, VASO PRESS, CALF GARMENT, MEDIUM, GREEN

## (undated) DEVICE — ADHESIVE, SKIN, DERMABOND ADVANCED, 15CM, PEN-STYLE

## (undated) DEVICE — SUTURE, PDS II, 0 36 IN, CT-1, VIOLET

## (undated) DEVICE — SUTURE, VICRYL, 2-0, 27 IN, SH, UNDYED

## (undated) DEVICE — TOWELS 4-PK

## (undated) DEVICE — TOWEL, SURGICAL, NEURO, O/R, 16 X 26, BLUE, STERILE

## (undated) DEVICE — CATHETER TRAY, SURESTEP, 16FR, URINE METER W/STATLOCK

## (undated) DEVICE — DRAIN, WOUND, FLAT, HUBLESS, 0.75 PERFORATION, 10 MM X 20 CM, SILICONE

## (undated) DEVICE — DRESSING, GAUZE, FLUFF, 1 PLY, 18 X 36 IN

## (undated) DEVICE — COVER, BACK TABLE

## (undated) DEVICE — SEAL, UNIVERSAL, 5-12MM

## (undated) DEVICE — Device

## (undated) DEVICE — KIT, ROBOTIC, CUSTOM UHC

## (undated) DEVICE — GLOVE, SURGICAL, PI ULTRATOUCH, BIOGEL, 7.5, PF, LF

## (undated) DEVICE — SYRINGE, 20 CC, CONTROL, LUER LOCK, LF

## (undated) DEVICE — DRESSING, GAUZE, PETROLATUM, VASELINE, 3 X 36 IN, STERILE

## (undated) DEVICE — KIT, MINOR, DOUBLE BASIN

## (undated) DEVICE — CLIP, LIGATING, HEM-O-LOCK, MLX, LARGE, LF, PURPLE

## (undated) DEVICE — SCISSORS, MONOPOLAR, CURVED, 8MM

## (undated) DEVICE — TRAY, SKIN SCRUB, WET PREP, WITH 4 COMPARMENT

## (undated) DEVICE — ADHESIVE, SKIN, LIQUIBAND EXCEED

## (undated) DEVICE — BAG, TISSUE RETRIEVAL, 15MM, ANCHOR

## (undated) DEVICE — GAUZE, X-RAY, RF DETECTABLE, 16 PLY, 4 X 4IN, STERILE

## (undated) DEVICE — DRAPE, ARM XI

## (undated) DEVICE — TROCAR, KII OPTICAL BLADELESS 5MM Z THREAD 100MM LNGTH

## (undated) DEVICE — OBTURATOR, BLADELESS  8MM

## (undated) DEVICE — PREP, SKIN, BETADINE, SCRUB, 16 OZ

## (undated) DEVICE — ACCESS PORT, 12MM, 100MM LENGTH, LOW PROFILE W/BLADELESS OPTICAL TIP

## (undated) DEVICE — DRESSING, NON-ADHERENT, TELFA, OUCHLESS, 3 X 4 IN, STERILE

## (undated) DEVICE — APPLICATOR, ENDOSCOPIC FLOSEAL

## (undated) DEVICE — SUTURE, SILK, 2-0, FSL, BR, 30 IN, BLACK

## (undated) DEVICE — SPONGE, GAUZE, RADIOPAQUE, 12 PLY, 4 X 8 IN, STERILE, LF

## (undated) DEVICE — OBTURATOR, BLADELESS , SU

## (undated) DEVICE — CATHETER TRAY, URETHRAL, W/O CATHETER, W/O BAG, LF

## (undated) DEVICE — SUPPORTER, ATHLETIC, ADULT, LARGE

## (undated) DEVICE — NEEDLE, HYPODERMIC, MONOJECT, 25 G X 1.5 IN, LUER LOCK HUB, RED

## (undated) DEVICE — DRAPE, INCISE, ANTIMICROBIAL, IOBAN 2, LARGE, 17 X 23 IN, DISPOSABLE, STERILE

## (undated) DEVICE — SLEEVE, SCD EXPRESS, KNEE LENGTH-MEDIUM

## (undated) DEVICE — SUTURE, MONOCRYL, 4-0, 27 IN, PS-2, UNDYED

## (undated) DEVICE — BANDAGE, GAUZE, CONFORMING, KERLIX, 6 PLY, 4.5 IN X 4.1 YD

## (undated) DEVICE — EVACUATOR, WOUND, SUCTION, CLOSED, JACKSON-PRATT, 100 CC, SILICONE

## (undated) DEVICE — RESERVOIR, TITAN CL, WITH LOCK OUT VALVE, 125CC

## (undated) DEVICE — DRESSING, ADHESIVE, ISLAND, TELFA, 2 X 3.75 IN, LF